# Patient Record
Sex: MALE | Race: WHITE | NOT HISPANIC OR LATINO | Employment: OTHER | URBAN - METROPOLITAN AREA
[De-identification: names, ages, dates, MRNs, and addresses within clinical notes are randomized per-mention and may not be internally consistent; named-entity substitution may affect disease eponyms.]

---

## 2017-02-02 ENCOUNTER — TELEPHONE (OUTPATIENT)
Dept: FAMILY MEDICINE | Facility: CLINIC | Age: 77
End: 2017-02-02

## 2017-02-02 DIAGNOSIS — E03.9 HYPOTHYROIDISM, UNSPECIFIED TYPE: Primary | ICD-10-CM

## 2017-02-02 DIAGNOSIS — R35.1 NOCTURIA: ICD-10-CM

## 2017-02-02 DIAGNOSIS — I10 ESSENTIAL HYPERTENSION: ICD-10-CM

## 2017-02-02 DIAGNOSIS — E78.5 HYPERLIPIDEMIA, UNSPECIFIED HYPERLIPIDEMIA TYPE: ICD-10-CM

## 2017-02-02 NOTE — TELEPHONE ENCOUNTER
Pt is asking for labs prior to upcoming annual appt on 2/23, asking for PSA as well.   Please advise and staff will  Need to call to sched.

## 2017-02-20 ENCOUNTER — LAB VISIT (OUTPATIENT)
Dept: LAB | Facility: HOSPITAL | Age: 77
End: 2017-02-20
Attending: INTERNAL MEDICINE
Payer: MEDICARE

## 2017-02-20 DIAGNOSIS — E78.5 HYPERLIPIDEMIA, UNSPECIFIED HYPERLIPIDEMIA TYPE: ICD-10-CM

## 2017-02-20 DIAGNOSIS — R35.1 NOCTURIA: ICD-10-CM

## 2017-02-20 DIAGNOSIS — E03.9 HYPOTHYROIDISM, UNSPECIFIED TYPE: ICD-10-CM

## 2017-02-20 DIAGNOSIS — I10 ESSENTIAL HYPERTENSION: ICD-10-CM

## 2017-02-20 LAB
ALBUMIN SERPL BCP-MCNC: 3.6 G/DL
ALP SERPL-CCNC: 63 U/L
ALT SERPL W/O P-5'-P-CCNC: 24 U/L
ANION GAP SERPL CALC-SCNC: 7 MMOL/L
AST SERPL-CCNC: 23 U/L
BASOPHILS # BLD AUTO: 0.03 K/UL
BASOPHILS NFR BLD: 0.3 %
BILIRUB SERPL-MCNC: 1.1 MG/DL
BUN SERPL-MCNC: 13 MG/DL
CALCIUM SERPL-MCNC: 9.5 MG/DL
CHLORIDE SERPL-SCNC: 109 MMOL/L
CHOLEST/HDLC SERPL: 5.1 {RATIO}
CO2 SERPL-SCNC: 26 MMOL/L
CREAT SERPL-MCNC: 1.2 MG/DL
DIFFERENTIAL METHOD: ABNORMAL
EOSINOPHIL # BLD AUTO: 0.3 K/UL
EOSINOPHIL NFR BLD: 3.1 %
ERYTHROCYTE [DISTWIDTH] IN BLOOD BY AUTOMATED COUNT: 13.2 %
EST. GFR  (AFRICAN AMERICAN): >60 ML/MIN/1.73 M^2
EST. GFR  (NON AFRICAN AMERICAN): 58.4 ML/MIN/1.73 M^2
GLUCOSE SERPL-MCNC: 92 MG/DL
HCT VFR BLD AUTO: 42.1 %
HDL/CHOLESTEROL RATIO: 19.5 %
HDLC SERPL-MCNC: 195 MG/DL
HDLC SERPL-MCNC: 38 MG/DL
HGB BLD-MCNC: 14.3 G/DL
LDLC SERPL CALC-MCNC: 122.4 MG/DL
LYMPHOCYTES # BLD AUTO: 3.2 K/UL
LYMPHOCYTES NFR BLD: 35 %
MCH RBC QN AUTO: 31.7 PG
MCHC RBC AUTO-ENTMCNC: 34 %
MCV RBC AUTO: 93 FL
MONOCYTES # BLD AUTO: 1.1 K/UL
MONOCYTES NFR BLD: 12.1 %
NEUTROPHILS # BLD AUTO: 4.5 K/UL
NEUTROPHILS NFR BLD: 49.3 %
NONHDLC SERPL-MCNC: 157 MG/DL
PLATELET # BLD AUTO: 216 K/UL
PMV BLD AUTO: 10 FL
POTASSIUM SERPL-SCNC: 4.7 MMOL/L
PROSTATE SPECIFIC ANTIGEN, TOTAL: 0.02 NG/ML
PROT SERPL-MCNC: 7.2 G/DL
PSA FREE MFR SERPL: NORMAL %
PSA FREE SERPL-MCNC: <0.01 NG/ML
RBC # BLD AUTO: 4.51 M/UL
SODIUM SERPL-SCNC: 142 MMOL/L
TRIGL SERPL-MCNC: 173 MG/DL
TSH SERPL DL<=0.005 MIU/L-ACNC: 2.68 UIU/ML
WBC # BLD AUTO: 9.17 K/UL

## 2017-02-20 PROCEDURE — 80061 LIPID PANEL: CPT

## 2017-02-20 PROCEDURE — 80053 COMPREHEN METABOLIC PANEL: CPT

## 2017-02-20 PROCEDURE — 36415 COLL VENOUS BLD VENIPUNCTURE: CPT | Mod: PO

## 2017-02-20 PROCEDURE — 85025 COMPLETE CBC W/AUTO DIFF WBC: CPT

## 2017-02-20 PROCEDURE — 84153 ASSAY OF PSA TOTAL: CPT

## 2017-02-20 PROCEDURE — 84443 ASSAY THYROID STIM HORMONE: CPT

## 2017-02-23 ENCOUNTER — OFFICE VISIT (OUTPATIENT)
Dept: INTERNAL MEDICINE | Facility: CLINIC | Age: 77
End: 2017-02-23
Payer: MEDICARE

## 2017-02-23 VITALS
HEIGHT: 68 IN | OXYGEN SATURATION: 98 % | SYSTOLIC BLOOD PRESSURE: 130 MMHG | HEART RATE: 61 BPM | BODY MASS INDEX: 26.36 KG/M2 | DIASTOLIC BLOOD PRESSURE: 70 MMHG | WEIGHT: 173.94 LBS | RESPIRATION RATE: 16 BRPM

## 2017-02-23 DIAGNOSIS — I10 ESSENTIAL HYPERTENSION: ICD-10-CM

## 2017-02-23 DIAGNOSIS — N18.2 CHRONIC KIDNEY DISEASE, STAGE II (MILD): ICD-10-CM

## 2017-02-23 DIAGNOSIS — E78.5 HYPERLIPIDEMIA, UNSPECIFIED HYPERLIPIDEMIA TYPE: ICD-10-CM

## 2017-02-23 DIAGNOSIS — Z00.00 HEALTH CARE MAINTENANCE: ICD-10-CM

## 2017-02-23 DIAGNOSIS — E03.9 HYPOTHYROIDISM, UNSPECIFIED TYPE: Primary | ICD-10-CM

## 2017-02-23 PROCEDURE — 1159F MED LIST DOCD IN RCRD: CPT | Mod: S$GLB,,, | Performed by: INTERNAL MEDICINE

## 2017-02-23 PROCEDURE — 99999 PR PBB SHADOW E&M-EST. PATIENT-LVL III: CPT | Mod: PBBFAC,,, | Performed by: INTERNAL MEDICINE

## 2017-02-23 PROCEDURE — 1126F AMNT PAIN NOTED NONE PRSNT: CPT | Mod: S$GLB,,, | Performed by: INTERNAL MEDICINE

## 2017-02-23 PROCEDURE — 3078F DIAST BP <80 MM HG: CPT | Mod: S$GLB,,, | Performed by: INTERNAL MEDICINE

## 2017-02-23 PROCEDURE — 1157F ADVNC CARE PLAN IN RCRD: CPT | Mod: S$GLB,,, | Performed by: INTERNAL MEDICINE

## 2017-02-23 PROCEDURE — 99499 UNLISTED E&M SERVICE: CPT | Mod: S$GLB,,, | Performed by: INTERNAL MEDICINE

## 2017-02-23 PROCEDURE — 3075F SYST BP GE 130 - 139MM HG: CPT | Mod: S$GLB,,, | Performed by: INTERNAL MEDICINE

## 2017-02-23 PROCEDURE — 1160F RVW MEDS BY RX/DR IN RCRD: CPT | Mod: S$GLB,,, | Performed by: INTERNAL MEDICINE

## 2017-02-23 PROCEDURE — 99214 OFFICE O/P EST MOD 30 MIN: CPT | Mod: S$GLB,,, | Performed by: INTERNAL MEDICINE

## 2017-02-23 RX ORDER — LEVOTHYROXINE SODIUM 75 UG/1
TABLET ORAL
Qty: 90 TABLET | Refills: 3 | Status: SHIPPED | OUTPATIENT
Start: 2017-02-23 | End: 2018-02-14 | Stop reason: SDUPTHER

## 2017-02-23 RX ORDER — LOSARTAN POTASSIUM 50 MG/1
50 TABLET ORAL DAILY
Qty: 90 TABLET | Refills: 3 | Status: SHIPPED | OUTPATIENT
Start: 2017-02-23 | End: 2017-08-04 | Stop reason: SDUPTHER

## 2017-02-23 RX ORDER — PRAVASTATIN SODIUM 20 MG/1
TABLET ORAL
Qty: 90 TABLET | Refills: 3 | Status: SHIPPED | OUTPATIENT
Start: 2017-02-23 | End: 2018-02-14 | Stop reason: SDUPTHER

## 2017-02-23 RX ORDER — PRAVASTATIN SODIUM 20 MG/1
TABLET ORAL
Qty: 90 TABLET | Refills: 2 | Status: CANCELLED | OUTPATIENT
Start: 2017-02-23

## 2017-02-23 RX ORDER — LEVOTHYROXINE SODIUM 75 UG/1
TABLET ORAL
Qty: 90 TABLET | Refills: 3 | Status: CANCELLED | OUTPATIENT
Start: 2017-02-23

## 2017-02-23 NOTE — PROGRESS NOTES
HISTORY OF PRESENT ILLNESS:  PtLaura is a 77 y.o. male presents for monitoring of his hypothyroidism, HTN, hyperlipidemia.  He also has glaucoma.  He had a HRA physical with the NP 10/18/16.  He states he has had a pneumovax previously, is UTD with Prevnar 13, Tdap, zoster, flu shot.  He had a fit check FIT, was negative for blood.  He has had colonoscopy previously.    Lab Results   Component Value Date    WBC 9.17 02/20/2017    HGB 14.3 02/20/2017    HCT 42.1 02/20/2017     02/20/2017    CHOL 195 02/20/2017    TRIG 173 (H) 02/20/2017    HDL 38 (L) 02/20/2017    ALT 24 02/20/2017    AST 23 02/20/2017     02/20/2017    K 4.7 02/20/2017     02/20/2017    CREATININE 1.2 02/20/2017    BUN 13 02/20/2017    CO2 26 02/20/2017    TSH 2.684 02/20/2017    PSA 0.11 01/22/2013    HGBA1C 5.2 01/24/2014     Lab Results   Component Value Date    LDLCALC 122.4 02/20/2017             ROS:  GENERAL: No fever, chills, fatigability or weight loss.  SKIN: No rashes, itching or changes in color or texture of skin.  HEAD: No headaches or recent head trauma.  EARS: Denies ear pain, discharge or vertigo.  NOSE: No loss of smell, no epistaxis or postnasal drip.  MOUTH & THROAT: No hoarseness or change in voice. No excessive gum bleeding.  NODES: Denies swollen glands.  CHEST: Denies PATTON, cyanosis, wheezing, cough and sputum production.  CARDIOVASCULAR: Denies chest pain, PND, orthopnea or reduced exercise tolerance.  ABDOMEN: Appetite fine. No weight loss. Denies constipation, diarrhea, abdominal pain, hematemesis or blood in stool.  URINARY: No flank pain, dysuria or hematuria.  PERIPHERAL VASCULAR: No claudication or cyanosis. No edema.  MUSCULOSKELETAL: No joint stiffness or swelling. Denies back pain.  NEUROLOGIC: Denies numbness    PE:   Vitals:   Vitals:    02/23/17 1745   BP: (!) 144/70   Pulse: 61   Resp: 16     GENERAL: no acute distress, A&Ox3, comfortable.  Male with BMI of 26   HEENT: tympanic membranes clear,  nasal mucosa pink, no pharyngeal erythema or exudate  NECK: supple, no cervical lymphadenopathy, no thyromegaly; no supraclavicular nodes;   CHEST:  clear to auscultation bilaterally, no crackles or wheeze; no increased work of breathing;  CARDIOVASCULAR: regular rate and rhythm, no rubs, murmurs or gallops.  ABDOMEN: normal bowel sounds, soft non-tender, non-distended; no palpable organomegaly;   EXT: no clubbing, cyanosis or edema.     ASSESSMENT/PLAN:    Hypothyroidism, unspecified type  -     levothyroxine (SYNTHROID) 75 MCG tablet; TAKE 1 TABLET EVERY DAY ( NEED  APPOINTMENT AND LAB )  Dispense: 90 tablet; Refill: 3    Essential hypertension  -     losartan (COZAAR) 50 MG tablet; Take 1 tablet (50 mg total) by mouth once daily.  Dispense: 90 tablet; Refill: 3    Hyperlipidemia, unspecified hyperlipidemia type  -     pravastatin (PRAVACHOL) 20 MG tablet; TAKE 1 TABLET ONE TIME DAILY  Dispense: 90 tablet; Refill: 3    CKD stage 2: encouraged free water intake;    Health Maintenance:  HRA physical with the NP 10/18/16.  He states he has had a pneumovax previously, is UTD with Prevnar 13, Tdap, zoster, flu shot.  He had a fit check FIT, was negative for blood.  He has had colonoscopy previously.        Call if condition changes or worsens.  Answers for HPI/ROS submitted by the patient on 2/21/2017   neck pain: No, No  unexpected weight change: No  hearing loss: No  rhinorrhea: No  trouble swallowing: No  eye discharge: No  visual disturbance: No  chest tightness: No  wheezing: No  chest pain: No  palpatations: No  blood in stool: No  constipation: No  vomiting: No  diarrhea: No  polydipsia: No  polyuria: No  difficulty urinating: No  urgency: No  hematuria: No  joint swelling: No  arthralgias: No  headaches: No  weakness: No  confusion: No  dysphoric mood: No

## 2017-02-23 NOTE — MR AVS SNAPSHOT
[facility]  [address] [facility] [address]  [facility] [address]  Phone: [phone]  Fax: [phone]                  Mina Valentine   2017 5:40 PM   Office Visit    Description:  Male : 1940   Provider:  Marcia Kearney MD   Department:  [facility]           Reason for Visit     Annual Exam           Diagnoses this Visit        Comments    Hypothyroidism, unspecified type    -  Primary     Essential hypertension         Hyperlipidemia, unspecified hyperlipidemia type                To Do List           Goals (5 Years of Data)     None       These Medications        Disp Refills Start End    levothyroxine (SYNTHROID) 75 MCG tablet 90 tablet 3 2017     TAKE 1 TABLET EVERY DAY ( NEED  APPOINTMENT AND LAB )    Pharmacy: [facility] - [facility] [address] Ph #: [phone]       losartan (COZAAR) 50 MG tablet 90 tablet 3 2017     Take 1 tablet (50 mg total) by mouth once daily. - Oral    Pharmacy: [facility] - [facility] [address] Ph #: [phone]       pravastatin (PRAVACHOL) 20 MG tablet 90 tablet 3 2017     TAKE 1 TABLET ONE TIME DAILY    Pharmacy: [facility] - [facility] [address] Ph #: [phone]         [facility]     [facility] -  Assistance  Registered nurses in the [facility] provide clinical advisement, health education, appointment booking, and other advisory services.  Call for this free service at [phone].             Medications           Message regarding Medications     Verify the changes and/or additions to your medication regime listed below are the same as discussed with your clinician today.  If any of these changes or additions are incorrect, please notify your healthcare provider.        CHANGE how you are taking these medications     Start Taking Instead of    losartan  "(COZAAR) 50 MG tablet losartan (COZAAR) 50 MG tablet    Dosage:  Take 1 tablet (50 mg total) by mouth once daily. Dosage:  TAKE 1 TABLET EVERY DAY    Reason for Change:  Reorder            Verify that the below list of medications is an accurate representation of the medications you are currently taking.  If none reported, the list may be blank. If incorrect, please contact your healthcare provider. Carry this list with you in case of emergency.           Current Medications     levothyroxine (SYNTHROID) 75 MCG tablet TAKE 1 TABLET EVERY DAY ( NEED  APPOINTMENT AND LAB )    loratadine (CLARITIN) 5 mg chewable tablet Take 5 mg by mouth once daily.    losartan (COZAAR) 50 MG tablet Take 1 tablet (50 mg total) by mouth once daily.    omega 3-dha-epa-fish oil (FISH OIL) 120-180-500 mg Cap     pravastatin (PRAVACHOL) 20 MG tablet TAKE 1 TABLET ONE TIME DAILY    timolol (TIMOPTIC) 0.5 % ophthalmic solution            Clinical Reference Information           Your Vitals Were     BP Pulse Resp Height Weight SpO2    130/70 61 16 5' 8" (1.727 m) 78.9 kg (173 lb 15.1 oz) 98%    BMI                26.45 kg/m2          Blood Pressure          Most Recent Value    BP  130/70 [home reading]      Allergies as of 2/23/2017     Penicillins      Immunizations Administered on Date of Encounter - 2/23/2017     None      Language Assistance Services     ATTENTION: Language assistance services are available, free of charge. Please call [phone].      ATENCIÓN: Si habla español, tiene a berger disposición servicios gratuitos de asistencia lingüística. Llame al [phone].     [facility] Ý: N?u b?n nói Ti?ng Vi?t, có các d?ch v? h? tr? ngôn ng? mi?n phí dành cho b?n. G?i s? [phone].         [facility] complies with applicable Federal civil rights laws and does not discriminate on the basis of race, color, national origin, age, disability, or sex.        "

## 2017-03-20 ENCOUNTER — PATIENT MESSAGE (OUTPATIENT)
Dept: INTERNAL MEDICINE | Facility: CLINIC | Age: 77
End: 2017-03-20

## 2017-03-25 ENCOUNTER — PATIENT MESSAGE (OUTPATIENT)
Dept: INTERNAL MEDICINE | Facility: CLINIC | Age: 77
End: 2017-03-25

## 2017-04-07 ENCOUNTER — PATIENT MESSAGE (OUTPATIENT)
Dept: INTERNAL MEDICINE | Facility: CLINIC | Age: 77
End: 2017-04-07

## 2017-04-09 ENCOUNTER — PATIENT MESSAGE (OUTPATIENT)
Dept: FAMILY MEDICINE | Facility: CLINIC | Age: 77
End: 2017-04-09

## 2017-04-10 ENCOUNTER — PATIENT MESSAGE (OUTPATIENT)
Dept: FAMILY MEDICINE | Facility: CLINIC | Age: 77
End: 2017-04-10

## 2017-04-10 ENCOUNTER — OFFICE VISIT (OUTPATIENT)
Dept: FAMILY MEDICINE | Facility: CLINIC | Age: 77
End: 2017-04-10
Payer: MEDICARE

## 2017-04-10 VITALS
SYSTOLIC BLOOD PRESSURE: 148 MMHG | DIASTOLIC BLOOD PRESSURE: 80 MMHG | RESPIRATION RATE: 18 BRPM | OXYGEN SATURATION: 97 % | TEMPERATURE: 98 F | BODY MASS INDEX: 25.99 KG/M2 | HEART RATE: 62 BPM | HEIGHT: 68 IN | WEIGHT: 171.5 LBS

## 2017-04-10 DIAGNOSIS — Z01.818 PRE-OPERATIVE CLEARANCE: ICD-10-CM

## 2017-04-10 DIAGNOSIS — H25.9 AGE-RELATED CATARACT OF LEFT EYE, UNSPECIFIED AGE-RELATED CATARACT TYPE: Primary | ICD-10-CM

## 2017-04-10 PROCEDURE — 1160F RVW MEDS BY RX/DR IN RCRD: CPT | Mod: S$GLB,,, | Performed by: NURSE PRACTITIONER

## 2017-04-10 PROCEDURE — 1157F ADVNC CARE PLAN IN RCRD: CPT | Mod: S$GLB,,, | Performed by: NURSE PRACTITIONER

## 2017-04-10 PROCEDURE — 99213 OFFICE O/P EST LOW 20 MIN: CPT | Mod: S$GLB,,, | Performed by: NURSE PRACTITIONER

## 2017-04-10 PROCEDURE — 99999 PR PBB SHADOW E&M-EST. PATIENT-LVL III: CPT | Mod: PBBFAC,,, | Performed by: NURSE PRACTITIONER

## 2017-04-10 PROCEDURE — 3077F SYST BP >= 140 MM HG: CPT | Mod: S$GLB,,, | Performed by: NURSE PRACTITIONER

## 2017-04-10 PROCEDURE — 1159F MED LIST DOCD IN RCRD: CPT | Mod: S$GLB,,, | Performed by: NURSE PRACTITIONER

## 2017-04-10 PROCEDURE — 3079F DIAST BP 80-89 MM HG: CPT | Mod: S$GLB,,, | Performed by: NURSE PRACTITIONER

## 2017-04-10 RX ORDER — NEPAFENAC 3 MG/ML
SUSPENSION/ DROPS OPHTHALMIC
COMMUNITY
Start: 2017-04-08 | End: 2017-08-04

## 2017-04-10 RX ORDER — BESIFLOXACIN 6 MG/ML
SUSPENSION OPHTHALMIC
COMMUNITY
Start: 2017-04-08 | End: 2017-08-04

## 2017-04-10 RX ORDER — DUREZOL 0.5 MG/ML
EMULSION OPHTHALMIC
COMMUNITY
Start: 2017-04-08 | End: 2017-08-04

## 2017-04-19 ENCOUNTER — PATIENT MESSAGE (OUTPATIENT)
Dept: FAMILY MEDICINE | Facility: CLINIC | Age: 77
End: 2017-04-19

## 2017-06-20 ENCOUNTER — TELEPHONE (OUTPATIENT)
Dept: ADMINISTRATIVE | Facility: HOSPITAL | Age: 77
End: 2017-06-20

## 2017-08-01 ENCOUNTER — TELEPHONE (OUTPATIENT)
Dept: FAMILY MEDICINE | Facility: CLINIC | Age: 77
End: 2017-08-01

## 2017-08-01 NOTE — TELEPHONE ENCOUNTER
----- Message from RT Ran sent at 8/1/2017  2:55 PM CDT -----  Contact: pt   pt , requesting to Cx his HRA appt of 08/10/2017, thanks.

## 2017-08-03 ENCOUNTER — DOCUMENTATION ONLY (OUTPATIENT)
Dept: FAMILY MEDICINE | Facility: CLINIC | Age: 77
End: 2017-08-03

## 2017-08-03 NOTE — PROGRESS NOTES
Pre-Visit Chart Review  For Appointment Scheduled on 8-4-17    Health Maintenance Due   Topic Date Due    Pneumococcal (65+) (2 of 2 - PPSV23) 02/10/2017    Influenza Vaccine  08/01/2017

## 2017-08-04 ENCOUNTER — OFFICE VISIT (OUTPATIENT)
Dept: FAMILY MEDICINE | Facility: CLINIC | Age: 77
End: 2017-08-04
Payer: MEDICARE

## 2017-08-04 ENCOUNTER — PATIENT MESSAGE (OUTPATIENT)
Dept: FAMILY MEDICINE | Facility: CLINIC | Age: 77
End: 2017-08-04

## 2017-08-04 VITALS
TEMPERATURE: 98 F | DIASTOLIC BLOOD PRESSURE: 60 MMHG | WEIGHT: 173.5 LBS | SYSTOLIC BLOOD PRESSURE: 150 MMHG | HEIGHT: 68 IN | BODY MASS INDEX: 26.3 KG/M2 | HEART RATE: 69 BPM

## 2017-08-04 DIAGNOSIS — I10 ESSENTIAL HYPERTENSION: ICD-10-CM

## 2017-08-04 DIAGNOSIS — E78.5 HYPERLIPIDEMIA, UNSPECIFIED HYPERLIPIDEMIA TYPE: ICD-10-CM

## 2017-08-04 DIAGNOSIS — E03.9 HYPOTHYROIDISM, UNSPECIFIED TYPE: ICD-10-CM

## 2017-08-04 DIAGNOSIS — N18.2 CHRONIC KIDNEY DISEASE, STAGE II (MILD): Primary | ICD-10-CM

## 2017-08-04 PROCEDURE — 3008F BODY MASS INDEX DOCD: CPT | Mod: S$GLB,,, | Performed by: FAMILY MEDICINE

## 2017-08-04 PROCEDURE — 99214 OFFICE O/P EST MOD 30 MIN: CPT | Mod: S$GLB,,, | Performed by: FAMILY MEDICINE

## 2017-08-04 PROCEDURE — 99999 PR PBB SHADOW E&M-EST. PATIENT-LVL III: CPT | Mod: PBBFAC,,, | Performed by: FAMILY MEDICINE

## 2017-08-04 PROCEDURE — 99499 UNLISTED E&M SERVICE: CPT | Mod: S$GLB,,, | Performed by: FAMILY MEDICINE

## 2017-08-04 PROCEDURE — 3077F SYST BP >= 140 MM HG: CPT | Mod: S$GLB,,, | Performed by: FAMILY MEDICINE

## 2017-08-04 PROCEDURE — 3078F DIAST BP <80 MM HG: CPT | Mod: S$GLB,,, | Performed by: FAMILY MEDICINE

## 2017-08-04 PROCEDURE — 1159F MED LIST DOCD IN RCRD: CPT | Mod: S$GLB,,, | Performed by: FAMILY MEDICINE

## 2017-08-04 PROCEDURE — 1126F AMNT PAIN NOTED NONE PRSNT: CPT | Mod: S$GLB,,, | Performed by: FAMILY MEDICINE

## 2017-08-04 RX ORDER — LOSARTAN POTASSIUM 100 MG/1
100 TABLET ORAL DAILY
Qty: 90 TABLET | Refills: 3 | Status: SHIPPED | OUTPATIENT
Start: 2017-08-04 | End: 2018-02-14 | Stop reason: SDUPTHER

## 2017-08-04 NOTE — PATIENT INSTRUCTIONS
Controlling High Blood Pressure  High blood pressure (hypertension) is often called the silent killer. This is because many people who have it dont know it. High blood pressure is defined as 140/90 mm Hg or higher. Know your blood pressure and remember to check it regularly. Doing so can save your life. Here are some things you can do to help control your blood pressure.    Choose heart-healthy foods  · Select low-salt, low-fat foods. Limit sodium intake to 2,400 mg per day or the amount suggested by your healthcare provider.  · Limit canned, dried, cured, packaged, and fast foods. These can contain a lot of salt.  · Eat 8 to 10 servings of fruits and vegetables every day.  · Choose lean meats, fish, or chicken.  · Eat whole-grain pasta, brown rice, and beans.  · Eat 2 to 3 servings of low-fat or fat-free dairy products.  · Ask your doctor about the DASH eating plan. This plan helps reduce blood pressure.  · When you go to a restaurant, ask that your meal be prepared with no added salt.  Maintain a healthy weight  · Ask your healthcare provider how many calories to eat a day. Then stick to that number.  · Ask your healthcare provider what weight range is healthiest for you. If you are overweight, a weight loss of only 3% to 5% of your body weight can help lower blood pressure. Generally, a good weight loss goal is to lose 10% of your body weight in a year.  · Limit snacks and sweets.  · Get regular exercise.  Get up and get active  · Choose activities you enjoy. Find ones you can do with friends or family. This includes bicycling, dancing, walking, and jogging.  · Park farther away from building entrances.  · Use stairs instead of the elevator.  · When you can, walk or bike instead of driving.  · Garfield leaves, garden, or do household repairs.  · Be active at a moderate to vigorous level of physical activity for at least 40 minutes for a minimum of 3 to 4 days a week.   Manage stress  · Make time to relax and enjoy  life. Find time to laugh.  · Communicate your concerns with your loved ones and your healthcare provider.  · Visit with family and friends, and keep up with hobbies.  Limit alcohol and quit smoking  · Men should have no more than 2 drinks per day.  · Women should have no more than 1 drink per day.  · Talk with your healthcare provider about quitting smoking. Smoking significantly increases your risk for heart disease and stroke. Ask your healthcare provider about community smoking cessation programs and other options.  Medicines  If lifestyle changes arent enough, your healthcare provider may prescribe high blood pressure medicine. Take all medicines as prescribed. If you have any questions about your medicines, ask your healthcare provider before stopping or changing them.   Date Last Reviewed: 4/27/2016  © 4950-0475 [employer]. [address] [place] [address], [place] [address]. All rights reserved. This information is not intended as a substitute for professional medical care. Always follow your healthcare professional's instructions.

## 2017-08-15 NOTE — PROGRESS NOTES
Subjective:       Patient ID: Mina Valentine is a 77 y.o. male.    Chief Complaint: Establish Care    Patient Active Problem List   Diagnosis    HTN (hypertension)    Chronic kidney disease, stage II (mild)    Hyperlipidemia    Glaucoma (increased eye pressure)    Hypothyroidism     Lab Results   Component Value Date    WBC 9.17 02/20/2017    HGB 14.3 02/20/2017    HCT 42.1 02/20/2017     02/20/2017    CHOL 195 02/20/2017    TRIG 173 (H) 02/20/2017    HDL 38 (L) 02/20/2017    ALT 24 02/20/2017    AST 23 02/20/2017     02/20/2017    K 4.7 02/20/2017     02/20/2017    CREATININE 1.2 02/20/2017    BUN 13 02/20/2017    CO2 26 02/20/2017    TSH 2.684 02/20/2017    PSA 0.11 01/22/2013    HGBA1C 5.2 01/24/2014     HPI  Review of Systems   Constitutional: Negative for activity change and unexpected weight change.   HENT: Negative for hearing loss, rhinorrhea and trouble swallowing.    Eyes: Negative for discharge and visual disturbance.   Respiratory: Negative for chest tightness and wheezing.    Cardiovascular: Negative for chest pain and palpitations.   Gastrointestinal: Negative for blood in stool, constipation, diarrhea and vomiting.   Endocrine: Negative for polydipsia and polyuria.   Genitourinary: Negative for difficulty urinating, hematuria and urgency.   Musculoskeletal: Negative for arthralgias, joint swelling and neck pain.   Neurological: Negative for weakness and headaches.   Psychiatric/Behavioral: Negative for confusion and dysphoric mood.       Objective:      Physical Exam   Constitutional: He is oriented to person, place, and time. He appears well-developed and well-nourished.   Cardiovascular: Normal rate, regular rhythm and normal heart sounds.    Pulmonary/Chest: Effort normal and breath sounds normal.   Musculoskeletal: He exhibits no edema.   Neurological: He is alert and oriented to person, place, and time.   Skin: Skin is warm and dry.   Psychiatric: He has a normal mood  and affect.   Nursing note and vitals reviewed.      Assessment:       1. Chronic kidney disease, stage II (mild)    2. Essential hypertension    3. Hyperlipidemia, unspecified hyperlipidemia type    4. Hypothyroidism, unspecified type        Plan:       1. Essential hypertension  Uncontrolled.  Increase to:  - losartan (COZAAR) 100 MG tablet; Take 1 tablet (100 mg total) by mouth once daily.  Dispense: 90 tablet; Refill: 3    2. Chronic kidney disease, stage II (mild)  Stable and chronic.  Will continue to monitor q3-6 months and control chronic conditions as optimally as possible to preserve function.      3. Hyperlipidemia, unspecified hyperlipidemia type  Stable condition.  Continue current medications.  Will adjust based on lab findings or if condition changes.    - Comprehensive metabolic panel; Future  - Lipid panel; Future    4. Hypothyroidism, unspecified type  Stable condition.  Continue current medications.  Will adjust based on lab findings or if condition changes.    - CBC auto differential; Future  - TSH; Future  - T4, free; Future    PCM goal documentation:  Patient readiness: acceptance and barriers:readiness  During the course of the visit the patient was educated and counseled about the following: Hypertension:   Dietary sodium restriction.  Regular aerobic exercise.  Goals: Hypertension: Reduce Blood Pressure  Goal/Outcomes met:Hypertension  The following self management tools provided:none  Patient Instructions (the written plan) was given to the patient/family: Yes  Time spent with patient: 20 minutes    Patient with be reevaluated in 6 months or sooner prn.  Nurse BP check 4 weeks    Greater than 50% of this visit was spent counseling as described in above documentation:Yes

## 2017-09-01 ENCOUNTER — LAB VISIT (OUTPATIENT)
Dept: LAB | Facility: HOSPITAL | Age: 77
End: 2017-09-01
Attending: FAMILY MEDICINE
Payer: MEDICARE

## 2017-09-01 ENCOUNTER — CLINICAL SUPPORT (OUTPATIENT)
Dept: FAMILY MEDICINE | Facility: CLINIC | Age: 77
End: 2017-09-01
Payer: MEDICARE

## 2017-09-01 VITALS — DIASTOLIC BLOOD PRESSURE: 75 MMHG | HEART RATE: 71 BPM | SYSTOLIC BLOOD PRESSURE: 167 MMHG

## 2017-09-01 DIAGNOSIS — E03.9 HYPOTHYROIDISM, UNSPECIFIED TYPE: ICD-10-CM

## 2017-09-01 DIAGNOSIS — E78.5 HYPERLIPIDEMIA, UNSPECIFIED HYPERLIPIDEMIA TYPE: ICD-10-CM

## 2017-09-01 LAB
ALBUMIN SERPL BCP-MCNC: 3.4 G/DL
ALP SERPL-CCNC: 68 U/L
ALT SERPL W/O P-5'-P-CCNC: 28 U/L
ANION GAP SERPL CALC-SCNC: 7 MMOL/L
AST SERPL-CCNC: 23 U/L
BASOPHILS # BLD AUTO: 0.03 K/UL
BASOPHILS NFR BLD: 0.3 %
BILIRUB SERPL-MCNC: 1 MG/DL
BUN SERPL-MCNC: 19 MG/DL
CALCIUM SERPL-MCNC: 9.5 MG/DL
CHLORIDE SERPL-SCNC: 109 MMOL/L
CHOLEST SERPL-MCNC: 206 MG/DL
CHOLEST/HDLC SERPL: 5.3 {RATIO}
CO2 SERPL-SCNC: 24 MMOL/L
CREAT SERPL-MCNC: 1.4 MG/DL
DIFFERENTIAL METHOD: NORMAL
EOSINOPHIL # BLD AUTO: 0.2 K/UL
EOSINOPHIL NFR BLD: 2.3 %
ERYTHROCYTE [DISTWIDTH] IN BLOOD BY AUTOMATED COUNT: 12.8 %
EST. GFR  (AFRICAN AMERICAN): 56 ML/MIN/1.73 M^2
EST. GFR  (NON AFRICAN AMERICAN): 48.5 ML/MIN/1.73 M^2
GLUCOSE SERPL-MCNC: 90 MG/DL
HCT VFR BLD AUTO: 43.3 %
HDLC SERPL-MCNC: 39 MG/DL
HDLC SERPL: 18.9 %
HGB BLD-MCNC: 14.4 G/DL
LDLC SERPL CALC-MCNC: 134.6 MG/DL
LYMPHOCYTES # BLD AUTO: 3 K/UL
LYMPHOCYTES NFR BLD: 35.3 %
MCH RBC QN AUTO: 31 PG
MCHC RBC AUTO-ENTMCNC: 33.3 G/DL
MCV RBC AUTO: 93 FL
MONOCYTES # BLD AUTO: 0.9 K/UL
MONOCYTES NFR BLD: 10.1 %
NEUTROPHILS # BLD AUTO: 4.5 K/UL
NEUTROPHILS NFR BLD: 51.9 %
NONHDLC SERPL-MCNC: 167 MG/DL
PLATELET # BLD AUTO: 232 K/UL
PMV BLD AUTO: 10.1 FL
POTASSIUM SERPL-SCNC: 4.9 MMOL/L
PROT SERPL-MCNC: 7.3 G/DL
RBC # BLD AUTO: 4.64 M/UL
SODIUM SERPL-SCNC: 140 MMOL/L
T4 FREE SERPL-MCNC: 1.08 NG/DL
TRIGL SERPL-MCNC: 162 MG/DL
TSH SERPL DL<=0.005 MIU/L-ACNC: 3.04 UIU/ML
WBC # BLD AUTO: 8.62 K/UL

## 2017-09-01 PROCEDURE — 80053 COMPREHEN METABOLIC PANEL: CPT

## 2017-09-01 PROCEDURE — 85025 COMPLETE CBC W/AUTO DIFF WBC: CPT

## 2017-09-01 PROCEDURE — 84443 ASSAY THYROID STIM HORMONE: CPT

## 2017-09-01 PROCEDURE — 84439 ASSAY OF FREE THYROXINE: CPT

## 2017-09-01 PROCEDURE — 80061 LIPID PANEL: CPT

## 2017-09-01 PROCEDURE — 36415 COLL VENOUS BLD VENIPUNCTURE: CPT | Mod: PO

## 2017-09-01 PROCEDURE — 99999 PR PBB SHADOW E&M-EST. PATIENT-LVL I: CPT | Mod: PBBFAC,,, | Performed by: FAMILY MEDICINE

## 2017-09-01 NOTE — PROGRESS NOTES
2 patient identifiers used ;BP taken in left arm 1st :189/88 p:74  , patient sitting ,feet flat on floor,back straight   2nd reading done in rt arm :167/75 p: 71 ,back straight , feet flat on floor . Last medication was this morning 9/1/17. Patient states under some stress this morning with mother in law.

## 2018-01-22 ENCOUNTER — TELEPHONE (OUTPATIENT)
Dept: FAMILY MEDICINE | Facility: CLINIC | Age: 78
End: 2018-01-22

## 2018-01-22 DIAGNOSIS — E03.9 HYPOTHYROIDISM, UNSPECIFIED TYPE: ICD-10-CM

## 2018-01-22 DIAGNOSIS — E78.5 HYPERLIPIDEMIA, UNSPECIFIED HYPERLIPIDEMIA TYPE: Primary | ICD-10-CM

## 2018-01-22 DIAGNOSIS — N18.2 CHRONIC KIDNEY DISEASE, STAGE II (MILD): ICD-10-CM

## 2018-01-22 NOTE — TELEPHONE ENCOUNTER
Patient has a appointment on 2-14-18, would like to do labs before appointment. Would you like to order any labs? Please advise.

## 2018-01-22 NOTE — TELEPHONE ENCOUNTER
----- Message from Dc Larios sent at 1/22/2018  1:16 PM CST -----  Contact: self   Patient need orders for bloodwork, any questions please call back at [phone] (home)

## 2018-02-06 ENCOUNTER — LAB VISIT (OUTPATIENT)
Dept: LAB | Facility: HOSPITAL | Age: 78
End: 2018-02-06
Attending: FAMILY MEDICINE
Payer: MEDICARE

## 2018-02-06 DIAGNOSIS — E03.9 HYPOTHYROIDISM, UNSPECIFIED TYPE: ICD-10-CM

## 2018-02-06 DIAGNOSIS — E78.5 HYPERLIPIDEMIA, UNSPECIFIED HYPERLIPIDEMIA TYPE: ICD-10-CM

## 2018-02-06 DIAGNOSIS — N18.2 CHRONIC KIDNEY DISEASE, STAGE II (MILD): ICD-10-CM

## 2018-02-06 LAB
ALBUMIN SERPL BCP-MCNC: 3.7 G/DL
ALP SERPL-CCNC: 72 U/L
ALT SERPL W/O P-5'-P-CCNC: 25 U/L
ANION GAP SERPL CALC-SCNC: 10 MMOL/L
AST SERPL-CCNC: 25 U/L
BASOPHILS # BLD AUTO: 0.06 K/UL
BASOPHILS NFR BLD: 0.7 %
BILIRUB SERPL-MCNC: 1.3 MG/DL
BUN SERPL-MCNC: 16 MG/DL
CALCIUM SERPL-MCNC: 9.6 MG/DL
CHLORIDE SERPL-SCNC: 106 MMOL/L
CHOLEST SERPL-MCNC: 207 MG/DL
CHOLEST/HDLC SERPL: 3.9 {RATIO}
CO2 SERPL-SCNC: 24 MMOL/L
CREAT SERPL-MCNC: 1.2 MG/DL
DIFFERENTIAL METHOD: NORMAL
EOSINOPHIL # BLD AUTO: 0.2 K/UL
EOSINOPHIL NFR BLD: 1.7 %
ERYTHROCYTE [DISTWIDTH] IN BLOOD BY AUTOMATED COUNT: 12.7 %
EST. GFR  (AFRICAN AMERICAN): >60 ML/MIN/1.73 M^2
EST. GFR  (NON AFRICAN AMERICAN): 58.4 ML/MIN/1.73 M^2
GLUCOSE SERPL-MCNC: 94 MG/DL
HCT VFR BLD AUTO: 43.3 %
HDLC SERPL-MCNC: 53 MG/DL
HDLC SERPL: 25.6 %
HGB BLD-MCNC: 14.1 G/DL
IMM GRANULOCYTES # BLD AUTO: 0.02 K/UL
IMM GRANULOCYTES NFR BLD AUTO: 0.2 %
LDLC SERPL CALC-MCNC: 124 MG/DL
LYMPHOCYTES # BLD AUTO: 3 K/UL
LYMPHOCYTES NFR BLD: 32.4 %
MCH RBC QN AUTO: 30.5 PG
MCHC RBC AUTO-ENTMCNC: 32.6 G/DL
MCV RBC AUTO: 94 FL
MONOCYTES # BLD AUTO: 0.9 K/UL
MONOCYTES NFR BLD: 9.6 %
NEUTROPHILS # BLD AUTO: 5.1 K/UL
NEUTROPHILS NFR BLD: 55.4 %
NONHDLC SERPL-MCNC: 154 MG/DL
NRBC BLD-RTO: 0 /100 WBC
PLATELET # BLD AUTO: 261 K/UL
PMV BLD AUTO: 9.9 FL
POTASSIUM SERPL-SCNC: 4.2 MMOL/L
PROT SERPL-MCNC: 7.7 G/DL
RBC # BLD AUTO: 4.63 M/UL
SODIUM SERPL-SCNC: 140 MMOL/L
T3FREE SERPL-MCNC: 2.5 PG/ML
T4 FREE SERPL-MCNC: 1.12 NG/DL
TRIGL SERPL-MCNC: 150 MG/DL
TSH SERPL DL<=0.005 MIU/L-ACNC: 4.13 UIU/ML
WBC # BLD AUTO: 9.18 K/UL

## 2018-02-06 PROCEDURE — 80061 LIPID PANEL: CPT

## 2018-02-06 PROCEDURE — 85025 COMPLETE CBC W/AUTO DIFF WBC: CPT

## 2018-02-06 PROCEDURE — 84439 ASSAY OF FREE THYROXINE: CPT

## 2018-02-06 PROCEDURE — 84443 ASSAY THYROID STIM HORMONE: CPT

## 2018-02-06 PROCEDURE — 36415 COLL VENOUS BLD VENIPUNCTURE: CPT | Mod: PO

## 2018-02-06 PROCEDURE — 84481 FREE ASSAY (FT-3): CPT

## 2018-02-06 PROCEDURE — 80053 COMPREHEN METABOLIC PANEL: CPT

## 2018-02-14 ENCOUNTER — OFFICE VISIT (OUTPATIENT)
Dept: FAMILY MEDICINE | Facility: CLINIC | Age: 78
End: 2018-02-14
Payer: MEDICARE

## 2018-02-14 ENCOUNTER — DOCUMENTATION ONLY (OUTPATIENT)
Dept: FAMILY MEDICINE | Facility: CLINIC | Age: 78
End: 2018-02-14

## 2018-02-14 VITALS
SYSTOLIC BLOOD PRESSURE: 160 MMHG | TEMPERATURE: 98 F | BODY MASS INDEX: 26.03 KG/M2 | HEART RATE: 68 BPM | DIASTOLIC BLOOD PRESSURE: 66 MMHG | HEIGHT: 68 IN | WEIGHT: 171.75 LBS

## 2018-02-14 DIAGNOSIS — I10 ESSENTIAL HYPERTENSION: Primary | ICD-10-CM

## 2018-02-14 DIAGNOSIS — E78.5 HYPERLIPIDEMIA, UNSPECIFIED HYPERLIPIDEMIA TYPE: ICD-10-CM

## 2018-02-14 DIAGNOSIS — E03.9 HYPOTHYROIDISM, UNSPECIFIED TYPE: ICD-10-CM

## 2018-02-14 PROCEDURE — 3008F BODY MASS INDEX DOCD: CPT | Mod: S$GLB,,, | Performed by: FAMILY MEDICINE

## 2018-02-14 PROCEDURE — 99999 PR PBB SHADOW E&M-EST. PATIENT-LVL III: CPT | Mod: PBBFAC,,, | Performed by: FAMILY MEDICINE

## 2018-02-14 PROCEDURE — 99214 OFFICE O/P EST MOD 30 MIN: CPT | Mod: S$GLB,,, | Performed by: FAMILY MEDICINE

## 2018-02-14 PROCEDURE — 99499 UNLISTED E&M SERVICE: CPT | Mod: S$GLB,,, | Performed by: FAMILY MEDICINE

## 2018-02-14 PROCEDURE — 1126F AMNT PAIN NOTED NONE PRSNT: CPT | Mod: S$GLB,,, | Performed by: FAMILY MEDICINE

## 2018-02-14 PROCEDURE — 1159F MED LIST DOCD IN RCRD: CPT | Mod: S$GLB,,, | Performed by: FAMILY MEDICINE

## 2018-02-14 RX ORDER — LOSARTAN POTASSIUM 100 MG/1
100 TABLET ORAL DAILY
Qty: 90 TABLET | Refills: 3 | Status: SHIPPED | OUTPATIENT
Start: 2018-02-14 | End: 2020-11-17 | Stop reason: SDUPTHER

## 2018-02-14 RX ORDER — PRAVASTATIN SODIUM 20 MG/1
TABLET ORAL
Qty: 90 TABLET | Refills: 3 | Status: SHIPPED | OUTPATIENT
Start: 2018-02-14 | End: 2020-11-25

## 2018-02-14 RX ORDER — METOPROLOL SUCCINATE 50 MG/1
50 TABLET, EXTENDED RELEASE ORAL DAILY
Qty: 90 TABLET | Refills: 3 | Status: SHIPPED | OUTPATIENT
Start: 2018-02-14 | End: 2018-08-15 | Stop reason: SINTOL

## 2018-02-14 RX ORDER — LEVOTHYROXINE SODIUM 75 UG/1
TABLET ORAL
Qty: 90 TABLET | Refills: 3 | Status: SHIPPED | OUTPATIENT
Start: 2018-02-14 | End: 2020-12-15 | Stop reason: SDUPTHER

## 2018-02-14 NOTE — PROGRESS NOTES
Subjective:       Patient ID: Mina Valentine is a 78 y.o. male.    Chief Complaint: Annual Exam    Patient Active Problem List   Diagnosis    HTN (hypertension)    Chronic kidney disease, stage II (mild)    Hyperlipidemia    Glaucoma (increased eye pressure)    Hypothyroidism   reviewed recent labs'  Lab Visit on 02/06/2018   Component Date Value Ref Range Status    WBC 02/06/2018 9.18  3.90 - 12.70 K/uL Final    RBC 02/06/2018 4.63  4.60 - 6.20 M/uL Final    Hemoglobin 02/06/2018 14.1  14.0 - 18.0 g/dL Final    Hematocrit 02/06/2018 43.3  40.0 - 54.0 % Final    MCV 02/06/2018 94  82 - 98 fL Final    MCH 02/06/2018 30.5  27.0 - 31.0 pg Final    MCHC 02/06/2018 32.6  32.0 - 36.0 g/dL Final    RDW 02/06/2018 12.7  11.5 - 14.5 % Final    Platelets 02/06/2018 261  150 - 350 K/uL Final    MPV 02/06/2018 9.9  9.2 - 12.9 fL Final    Immature Granulocytes 02/06/2018 0.2  0.0 - 0.5 % Final    Gran # (ANC) 02/06/2018 5.1  1.8 - 7.7 K/uL Final    Immature Grans (Abs) 02/06/2018 0.02  0.00 - 0.04 K/uL Final    Lymph # 02/06/2018 3.0  1.0 - 4.8 K/uL Final    Mono # 02/06/2018 0.9  0.3 - 1.0 K/uL Final    Eos # 02/06/2018 0.2  0.0 - 0.5 K/uL Final    Baso # 02/06/2018 0.06  0.00 - 0.20 K/uL Final    nRBC 02/06/2018 0  0 /100 WBC Final    Gran% 02/06/2018 55.4  38.0 - 73.0 % Final    Lymph% 02/06/2018 32.4  18.0 - 48.0 % Final    Mono% 02/06/2018 9.6  4.0 - 15.0 % Final    Eosinophil% 02/06/2018 1.7  0.0 - 8.0 % Final    Basophil% 02/06/2018 0.7  0.0 - 1.9 % Final    Differential Method 02/06/2018 Automated   Final    Sodium 02/06/2018 140  136 - 145 mmol/L Final    Potassium 02/06/2018 4.2  3.5 - 5.1 mmol/L Final    Chloride 02/06/2018 106  95 - 110 mmol/L Final    CO2 02/06/2018 24  23 - 29 mmol/L Final    Glucose 02/06/2018 94  70 - 110 mg/dL Final    BUN, Bld 02/06/2018 16  8 - 23 mg/dL Final    Creatinine 02/06/2018 1.2  0.5 - 1.4 mg/dL Final    Calcium 02/06/2018 9.6  8.7 - 10.5 mg/dL  Final    Total Protein 02/06/2018 7.7  6.0 - 8.4 g/dL Final    Albumin 02/06/2018 3.7  3.5 - 5.2 g/dL Final    Total Bilirubin 02/06/2018 1.3* 0.1 - 1.0 mg/dL Final    Alkaline Phosphatase 02/06/2018 72  55 - 135 U/L Final    AST 02/06/2018 25  10 - 40 U/L Final    ALT 02/06/2018 25  10 - 44 U/L Final    Anion Gap 02/06/2018 10  8 - 16 mmol/L Final    eGFR if African American 02/06/2018 >60.0  >60 mL/min/1.73 m^2 Final    eGFR if non African American 02/06/2018 58.4* >60 mL/min/1.73 m^2 Final    Cholesterol 02/06/2018 207* 120 - 199 mg/dL Final    Triglycerides 02/06/2018 150  30 - 150 mg/dL Final    HDL 02/06/2018 53  40 - 75 mg/dL Final    LDL Cholesterol 02/06/2018 124.0  63.0 - 159.0 mg/dL Final    HDL/Chol Ratio 02/06/2018 25.6  20.0 - 50.0 % Final    Total Cholesterol/HDL Ratio 02/06/2018 3.9  2.0 - 5.0 Final    Non-HDL Cholesterol 02/06/2018 154  mg/dL Final    TSH 02/06/2018 4.133* 0.400 - 4.000 uIU/mL Final    T3, Free 02/06/2018 2.5  2.3 - 4.2 pg/mL Final    Free T4 02/06/2018 1.12  0.71 - 1.51 ng/dL Final       HPI  Review of Systems   Constitutional: Negative for activity change and unexpected weight change.   HENT: Negative for hearing loss, rhinorrhea and trouble swallowing.    Eyes: Negative for discharge and visual disturbance.   Respiratory: Negative for chest tightness and wheezing.    Cardiovascular: Negative for chest pain and palpitations.   Gastrointestinal: Negative for blood in stool, constipation, diarrhea and vomiting.   Endocrine: Negative for polydipsia and polyuria.   Genitourinary: Negative for difficulty urinating, hematuria and urgency.   Musculoskeletal: Negative for arthralgias, joint swelling and neck pain.   Neurological: Negative for weakness and headaches.   Psychiatric/Behavioral: Negative for confusion and dysphoric mood.       Objective:      Physical Exam   Constitutional: He is oriented to person, place, and time. He appears well-developed and  well-nourished.   Cardiovascular: Normal rate, regular rhythm and normal heart sounds.    Pulmonary/Chest: Effort normal and breath sounds normal.   Musculoskeletal: He exhibits no edema.   Neurological: He is alert and oriented to person, place, and time.   Skin: Skin is warm and dry.   Psychiatric: He has a normal mood and affect.   Nursing note and vitals reviewed.      Assessment:       1. Essential hypertension    2. Hyperlipidemia, unspecified hyperlipidemia type    3. Hypothyroidism, unspecified type        Plan:       1. Essential hypertension  Uncontrolled.  Add:  - metoprolol succinate (TOPROL-XL) 50 MG 24 hr tablet; Take 1 tablet (50 mg total) by mouth once daily.  Dispense: 90 tablet; Refill: 3  Continue  - losartan (COZAAR) 100 MG tablet; Take 1 tablet (100 mg total) by mouth once daily.  Dispense: 90 tablet; Refill: 3    2. Hyperlipidemia, unspecified hyperlipidemia type  Controlled on current medications.  Continue current medications.    - pravastatin (PRAVACHOL) 20 MG tablet; TAKE 1 TABLET ONE TIME DAILY  Dispense: 90 tablet; Refill: 3    3. Hypothyroidism, unspecified type  Stable condition.  Continue current medications.  Will adjust based on lab findings or if condition changes.'    - levothyroxine (SYNTHROID) 75 MCG tablet; TAKE 1 TABLET EVERY DAY ( NEED  APPOINTMENT AND LAB )  Dispense: 90 tablet; Refill: 3  [facility] goal documentation:  Patient readiness: acceptance and barriers:readiness  During the course of the visit the patient was educated and counseled about the following: Hypertension:   Dietary sodium restriction.  Regular aerobic exercise.  Goals: Hypertension: Reduce Blood Pressure  Goal/Outcomes met:Hypertension  The following self management tools provided:none  Patient Instructions (the written plan) was given to the patient/family: Yes  Time spent with patient: 20 minutes    Patient with be reevaluated in 6 months or sooner prn    Greater than 50% of this visit was spent counseling  as described in above documentation:Yes

## 2018-02-14 NOTE — PROGRESS NOTES
Pre-Visit Chart Review  For Appointment Scheduled on 2-14-18    There are no preventive care reminders to display for this patient.

## 2018-03-14 ENCOUNTER — PES CALL (OUTPATIENT)
Dept: ADMINISTRATIVE | Facility: CLINIC | Age: 78
End: 2018-03-14

## 2018-03-21 ENCOUNTER — CLINICAL SUPPORT (OUTPATIENT)
Dept: FAMILY MEDICINE | Facility: CLINIC | Age: 78
End: 2018-03-21
Payer: MEDICARE

## 2018-03-21 DIAGNOSIS — I10 ESSENTIAL HYPERTENSION: Primary | ICD-10-CM

## 2018-03-21 PROCEDURE — 99999 PR PBB SHADOW E&M-EST. PATIENT-LVL II: CPT | Mod: PBBFAC,,, | Performed by: FAMILY MEDICINE

## 2018-03-22 VITALS — DIASTOLIC BLOOD PRESSURE: 60 MMHG | HEART RATE: 54 BPM | SYSTOLIC BLOOD PRESSURE: 140 MMHG

## 2018-03-22 NOTE — NURSING
2 patient identifiers used (name & ). Patient came in for nurse blood pressure check. Automatic reading was 154/62 P-54. Left arm resting on desk, feet flat on floor, back straight. Patient had no c/o pain. Patient stated that they took their prescribed blood pressure medication this am at 630am. Allergies and medications reviewed with the patient. Blood pressure re-checked after 5 minutes with manual cuff, reading was 140/60. Patient advised to continue taking medications as prescribed and follow up as scheduled.

## 2018-03-22 NOTE — PROGRESS NOTES
Patient came in for nurse blood pressure check. Automatic reading was 154/62 P-54. Left arm resting on desk, feet flat on floor, back straight. Patient had no c/o pain. Patient stated that they took their prescribed blood pressure medication this am at 630am. Allergies and medications reviewed with the patient. Blood pressure re-checked after 5 minutes with manual cuff, reading was 140/60. Patient advised to continue taking medications as prescribed and follow up as scheduled.

## 2018-04-06 ENCOUNTER — DOCUMENTATION ONLY (OUTPATIENT)
Dept: FAMILY MEDICINE | Facility: CLINIC | Age: 78
End: 2018-04-06

## 2018-05-17 ENCOUNTER — DOCUMENTATION ONLY (OUTPATIENT)
Dept: FAMILY MEDICINE | Facility: CLINIC | Age: 78
End: 2018-05-17

## 2018-05-17 NOTE — PROGRESS NOTES
Pre-Visit Chart Review  For Appointment Scheduled on 05/18/2018    There are no preventive care reminders to display for this patient.

## 2018-05-18 ENCOUNTER — OFFICE VISIT (OUTPATIENT)
Dept: FAMILY MEDICINE | Facility: CLINIC | Age: 78
End: 2018-05-18
Payer: MEDICARE

## 2018-05-18 VITALS
DIASTOLIC BLOOD PRESSURE: 60 MMHG | HEIGHT: 68 IN | WEIGHT: 171.5 LBS | BODY MASS INDEX: 25.99 KG/M2 | HEART RATE: 50 BPM | SYSTOLIC BLOOD PRESSURE: 138 MMHG | TEMPERATURE: 98 F

## 2018-05-18 DIAGNOSIS — Z00.00 ENCOUNTER FOR PREVENTIVE HEALTH EXAMINATION: Primary | ICD-10-CM

## 2018-05-18 DIAGNOSIS — E78.5 HYPERLIPIDEMIA, UNSPECIFIED HYPERLIPIDEMIA TYPE: ICD-10-CM

## 2018-05-18 DIAGNOSIS — E03.9 HYPOTHYROIDISM, UNSPECIFIED TYPE: ICD-10-CM

## 2018-05-18 DIAGNOSIS — N18.30 CKD (CHRONIC KIDNEY DISEASE) STAGE 3, GFR 30-59 ML/MIN: ICD-10-CM

## 2018-05-18 DIAGNOSIS — I10 ESSENTIAL HYPERTENSION: ICD-10-CM

## 2018-05-18 DIAGNOSIS — H40.9 GLAUCOMA, UNSPECIFIED GLAUCOMA TYPE, UNSPECIFIED LATERALITY: ICD-10-CM

## 2018-05-18 PROCEDURE — G0439 PPPS, SUBSEQ VISIT: HCPCS | Mod: S$GLB,,, | Performed by: PHYSICIAN ASSISTANT

## 2018-05-18 PROCEDURE — 3075F SYST BP GE 130 - 139MM HG: CPT | Mod: CPTII,S$GLB,, | Performed by: PHYSICIAN ASSISTANT

## 2018-05-18 PROCEDURE — 3078F DIAST BP <80 MM HG: CPT | Mod: CPTII,S$GLB,, | Performed by: PHYSICIAN ASSISTANT

## 2018-05-18 PROCEDURE — 99499 UNLISTED E&M SERVICE: CPT | Mod: S$GLB,,, | Performed by: PHYSICIAN ASSISTANT

## 2018-05-18 PROCEDURE — 99999 PR PBB SHADOW E&M-EST. PATIENT-LVL IV: CPT | Mod: PBBFAC,,, | Performed by: PHYSICIAN ASSISTANT

## 2018-05-18 NOTE — PROGRESS NOTES
"Mina Valentine presented for a  Medicare AWV and comprehensive Health Risk Assessment today. The following components were reviewed and updated:    · Medical history  · Family History  · Social history  · Allergies and Current Medications  · Health Risk Assessment  · Health Maintenance  · Care Team     ** See Completed Assessments for Annual Wellness Visit within the encounter summary.**       The following assessments were completed:  · Living Situation  · CAGE  · Depression Screening  · Timed Get Up and Go  · Whisper Test  · Cognitive Function Screening  · Nutrition Screening  · ADL Screening  · PAQ Screening    Vitals:    05/18/18 0945   BP: 138/60   BP Location: Left arm   Patient Position: Sitting   BP Method: Small (Manual)   Pulse: (!) 50   Temp: 97.6 °F (36.4 °C)   TempSrc: Oral   Weight: 77.8 kg (171 lb 8.3 oz)   Height: 5' 8" (1.727 m)     Body mass index is 26.08 kg/m².  Physical Exam   Constitutional: He is oriented to person, place, and time. He appears well-developed and well-nourished. No distress.   HENT:   Head: Normocephalic and atraumatic.   Neck: Normal range of motion. Neck supple. No JVD present.   Pulmonary/Chest: Effort normal.   Neurological: He is alert and oriented to person, place, and time.   Skin: He is not diaphoretic.               Diagnoses and health risks identified today and associated recommendations/orders:    Mina was seen today for health risk assessment.    Diagnoses and all orders for this visit:    Encounter for preventive health examination    Essential hypertension  Comments:  controlled, continue meds    Hypothyroidism, unspecified type  Comments:  stable, continue meds and recheck level in 2 months    Glaucoma, unspecified glaucoma type, unspecified laterality  Comments:  stable, followed by opt    Hyperlipidemia, unspecified hyperlipidemia type  Comments:  controlled, continue meds    CKD (chronic kidney disease) stage 3, GFR 30-59 ml/min  Comments:  stable, continue " to monitor        Provided Mina with a 5-10 year written screening schedule and personal prevention plan. Recommendations were developed using the USPSTF age appropriate recommendations. Education, counseling, and referrals were provided as needed. After Visit Summary printed and given to patient which includes a list of additional screenings\tests needed.    No Follow-up on file.    CHON Ellis     Patient readiness: acceptance and barriers:none    During the course of the visit the patient was educated and counseled about the following:     Hypertension:   Regular aerobic exercise.    Goals: Hypertension: Reduce Blood Pressure    Did patient meet goals/outcomes: Yes    The following self management tools provided: declined    Patient Instructions (the written plan) was given to the patient/family.     Time spent with patient: 55 minutes    Barriers to medications present (no )    Adverse reactions to current medications (no)    Over the counter medications reviewed (Yes)

## 2018-05-18 NOTE — Clinical Note
Primary Care Providers: Amber Canseco MD, MD (General)  Your patient was seen today for a HRA visit. Gap(s) in care (HEDIS gaps) have been identified during this visit that require additional testing and possible follow up.  No orders of the defined types were placed in this encounter.   These orders were placed using [facility] approved protocol and any results will be forwarded to your office for appropriate follow up. I have included a copy of my visit note; please review the note and feel free to contact me with any questions.   Thank you for allowing me to participate in the care of your patients. CHON Ellis

## 2018-05-18 NOTE — PATIENT INSTRUCTIONS
Counseling and Referral of Other Preventative  (Italic type indicates deductible and co-insurance are waived)    Patient Name: Mina Valentine  Today's Date: 5/18/2018    Health Maintenance       Date Due Completion Date    Influenza Vaccine 08/01/2018 8/18/2017    Lipid Panel 02/06/2019 2/6/2018    TETANUS VACCINE 03/18/2023 3/18/2013        No orders of the defined types were placed in this encounter.    The following information is provided to all patients.  This information is to help you find resources for any of the problems found today that may be affecting your health:                Living healthy guide: www.[facility].[place].[facility]      Understanding Diabetes: www.diabetes.org      Eating healthy: www.cdc.gov/healthyweight      CDC home safety checklist: www.cdc.gov/steadi/patient.html      Agency on Aging: www.goea.[place].[facility]      Alcoholics anonymous (AA): www.aa.org      Physical Activity: www.domonique.nih.gov/es5mwah      Tobacco use: www.quitwithusla.org

## 2018-08-14 ENCOUNTER — PATIENT MESSAGE (OUTPATIENT)
Dept: FAMILY MEDICINE | Facility: CLINIC | Age: 78
End: 2018-08-14

## 2018-08-15 ENCOUNTER — OFFICE VISIT (OUTPATIENT)
Dept: FAMILY MEDICINE | Facility: CLINIC | Age: 78
End: 2018-08-15
Payer: MEDICARE

## 2018-08-15 ENCOUNTER — LAB VISIT (OUTPATIENT)
Dept: LAB | Facility: HOSPITAL | Age: 78
End: 2018-08-15
Attending: FAMILY MEDICINE
Payer: MEDICARE

## 2018-08-15 VITALS
DIASTOLIC BLOOD PRESSURE: 70 MMHG | BODY MASS INDEX: 23.95 KG/M2 | TEMPERATURE: 98 F | OXYGEN SATURATION: 97 % | HEART RATE: 64 BPM | HEIGHT: 68 IN | RESPIRATION RATE: 14 BRPM | WEIGHT: 158.06 LBS | SYSTOLIC BLOOD PRESSURE: 140 MMHG

## 2018-08-15 DIAGNOSIS — I10 ESSENTIAL HYPERTENSION: ICD-10-CM

## 2018-08-15 DIAGNOSIS — E03.9 HYPOTHYROIDISM, UNSPECIFIED TYPE: ICD-10-CM

## 2018-08-15 DIAGNOSIS — B35.2 TINEA MANUS: ICD-10-CM

## 2018-08-15 DIAGNOSIS — N18.30 CKD (CHRONIC KIDNEY DISEASE) STAGE 3, GFR 30-59 ML/MIN: Primary | ICD-10-CM

## 2018-08-15 DIAGNOSIS — E78.5 HYPERLIPIDEMIA, UNSPECIFIED HYPERLIPIDEMIA TYPE: ICD-10-CM

## 2018-08-15 LAB
ALBUMIN SERPL BCP-MCNC: 3.7 G/DL
ALP SERPL-CCNC: 72 U/L
ALT SERPL W/O P-5'-P-CCNC: 22 U/L
ANION GAP SERPL CALC-SCNC: 9 MMOL/L
AST SERPL-CCNC: 21 U/L
BASOPHILS # BLD AUTO: 0.04 K/UL
BASOPHILS NFR BLD: 0.4 %
BILIRUB SERPL-MCNC: 1.3 MG/DL
BUN SERPL-MCNC: 22 MG/DL
CALCIUM SERPL-MCNC: 9.7 MG/DL
CHLORIDE SERPL-SCNC: 107 MMOL/L
CO2 SERPL-SCNC: 24 MMOL/L
CREAT SERPL-MCNC: 1.4 MG/DL
DIFFERENTIAL METHOD: ABNORMAL
EOSINOPHIL # BLD AUTO: 0.1 K/UL
EOSINOPHIL NFR BLD: 1.4 %
ERYTHROCYTE [DISTWIDTH] IN BLOOD BY AUTOMATED COUNT: 12.8 %
EST. GFR  (AFRICAN AMERICAN): 55.6 ML/MIN/1.73 M^2
EST. GFR  (NON AFRICAN AMERICAN): 48.1 ML/MIN/1.73 M^2
GLUCOSE SERPL-MCNC: 105 MG/DL
HCT VFR BLD AUTO: 43 %
HGB BLD-MCNC: 14.1 G/DL
IMM GRANULOCYTES # BLD AUTO: 0.03 K/UL
IMM GRANULOCYTES NFR BLD AUTO: 0.3 %
LYMPHOCYTES # BLD AUTO: 1.8 K/UL
LYMPHOCYTES NFR BLD: 19.8 %
MCH RBC QN AUTO: 31.3 PG
MCHC RBC AUTO-ENTMCNC: 32.8 G/DL
MCV RBC AUTO: 96 FL
MONOCYTES # BLD AUTO: 1.2 K/UL
MONOCYTES NFR BLD: 13.2 %
NEUTROPHILS # BLD AUTO: 6 K/UL
NEUTROPHILS NFR BLD: 64.9 %
NRBC BLD-RTO: 0 /100 WBC
PLATELET # BLD AUTO: 230 K/UL
PMV BLD AUTO: 9.9 FL
POTASSIUM SERPL-SCNC: 4.4 MMOL/L
PROT SERPL-MCNC: 7.3 G/DL
RBC # BLD AUTO: 4.5 M/UL
SODIUM SERPL-SCNC: 140 MMOL/L
T4 FREE SERPL-MCNC: 1.12 NG/DL
TSH SERPL DL<=0.005 MIU/L-ACNC: 2.35 UIU/ML
WBC # BLD AUTO: 9.3 K/UL

## 2018-08-15 PROCEDURE — 84439 ASSAY OF FREE THYROXINE: CPT

## 2018-08-15 PROCEDURE — 85025 COMPLETE CBC W/AUTO DIFF WBC: CPT

## 2018-08-15 PROCEDURE — 3077F SYST BP >= 140 MM HG: CPT | Mod: CPTII,S$GLB,, | Performed by: FAMILY MEDICINE

## 2018-08-15 PROCEDURE — 99999 PR PBB SHADOW E&M-EST. PATIENT-LVL IV: CPT | Mod: PBBFAC,,, | Performed by: FAMILY MEDICINE

## 2018-08-15 PROCEDURE — 99214 OFFICE O/P EST MOD 30 MIN: CPT | Mod: S$GLB,,, | Performed by: FAMILY MEDICINE

## 2018-08-15 PROCEDURE — 36415 COLL VENOUS BLD VENIPUNCTURE: CPT | Mod: PO

## 2018-08-15 PROCEDURE — 84443 ASSAY THYROID STIM HORMONE: CPT

## 2018-08-15 PROCEDURE — 3078F DIAST BP <80 MM HG: CPT | Mod: CPTII,S$GLB,, | Performed by: FAMILY MEDICINE

## 2018-08-15 PROCEDURE — 80053 COMPREHEN METABOLIC PANEL: CPT

## 2018-08-15 RX ORDER — TERBINAFINE HYDROCHLORIDE 250 MG/1
250 TABLET ORAL DAILY
Qty: 30 TABLET | Refills: 0 | Status: SHIPPED | OUTPATIENT
Start: 2018-08-15 | End: 2018-09-14

## 2018-08-15 NOTE — PROGRESS NOTES
Subjective:       Patient ID: Mina Valentine is a 78 y.o. male.    Chief Complaint: Follow-up (6mth f/u)    HPI  Review of Systems   Constitutional: Negative for fatigue and unexpected weight change.   Respiratory: Negative for chest tightness and shortness of breath.    Cardiovascular: Negative for chest pain, palpitations and leg swelling.   Gastrointestinal: Negative for abdominal pain.   Musculoskeletal: Negative for arthralgias.   Skin: Positive for rash.   Neurological: Negative for dizziness, syncope, light-headedness and headaches.       Patient Active Problem List   Diagnosis    HTN (hypertension)    Hyperlipidemia    Glaucoma (increased eye pressure)    Hypothyroidism    CKD (chronic kidney disease) stage 3, GFR 30-59 ml/min     Patient is here for a chronic conditions follow up.    No visits with results within 1 Month(s) from this visit.   Latest known visit with results is:   Lab Visit on 02/06/2018   Component Date Value Ref Range Status    WBC 02/06/2018 9.18  3.90 - 12.70 K/uL Final    RBC 02/06/2018 4.63  4.60 - 6.20 M/uL Final    Hemoglobin 02/06/2018 14.1  14.0 - 18.0 g/dL Final    Hematocrit 02/06/2018 43.3  40.0 - 54.0 % Final    MCV 02/06/2018 94  82 - 98 fL Final    MCH 02/06/2018 30.5  27.0 - 31.0 pg Final    MCHC 02/06/2018 32.6  32.0 - 36.0 g/dL Final    RDW 02/06/2018 12.7  11.5 - 14.5 % Final    Platelets 02/06/2018 261  150 - 350 K/uL Final    MPV 02/06/2018 9.9  9.2 - 12.9 fL Final    Immature Granulocytes 02/06/2018 0.2  0.0 - 0.5 % Final    Gran # (ANC) 02/06/2018 5.1  1.8 - 7.7 K/uL Final    Immature Grans (Abs) 02/06/2018 0.02  0.00 - 0.04 K/uL Final    Lymph # 02/06/2018 3.0  1.0 - 4.8 K/uL Final    Mono # 02/06/2018 0.9  0.3 - 1.0 K/uL Final    Eos # 02/06/2018 0.2  0.0 - 0.5 K/uL Final    Baso # 02/06/2018 0.06  0.00 - 0.20 K/uL Final    nRBC 02/06/2018 0  0 /100 WBC Final    Gran% 02/06/2018 55.4  38.0 - 73.0 % Final    Lymph% 02/06/2018 32.4  18.0 -  48.0 % Final    Mono% 02/06/2018 9.6  4.0 - 15.0 % Final    Eosinophil% 02/06/2018 1.7  0.0 - 8.0 % Final    Basophil% 02/06/2018 0.7  0.0 - 1.9 % Final    Differential Method 02/06/2018 Automated   Final    Sodium 02/06/2018 140  136 - 145 mmol/L Final    Potassium 02/06/2018 4.2  3.5 - 5.1 mmol/L Final    Chloride 02/06/2018 106  95 - 110 mmol/L Final    CO2 02/06/2018 24  23 - 29 mmol/L Final    Glucose 02/06/2018 94  70 - 110 mg/dL Final    BUN, Bld 02/06/2018 16  8 - 23 mg/dL Final    Creatinine 02/06/2018 1.2  0.5 - 1.4 mg/dL Final    Calcium 02/06/2018 9.6  8.7 - 10.5 mg/dL Final    Total Protein 02/06/2018 7.7  6.0 - 8.4 g/dL Final    Albumin 02/06/2018 3.7  3.5 - 5.2 g/dL Final    Total Bilirubin 02/06/2018 1.3* 0.1 - 1.0 mg/dL Final    Alkaline Phosphatase 02/06/2018 72  55 - 135 U/L Final    AST 02/06/2018 25  10 - 40 U/L Final    ALT 02/06/2018 25  10 - 44 U/L Final    Anion Gap 02/06/2018 10  8 - 16 mmol/L Final    eGFR if African American 02/06/2018 >60.0  >60 mL/min/1.73 m^2 Final    eGFR if non African American 02/06/2018 58.4* >60 mL/min/1.73 m^2 Final    Cholesterol 02/06/2018 207* 120 - 199 mg/dL Final    Triglycerides 02/06/2018 150  30 - 150 mg/dL Final    HDL 02/06/2018 53  40 - 75 mg/dL Final    LDL Cholesterol 02/06/2018 124.0  63.0 - 159.0 mg/dL Final    HDL/Chol Ratio 02/06/2018 25.6  20.0 - 50.0 % Final    Total Cholesterol/HDL Ratio 02/06/2018 3.9  2.0 - 5.0 Final    Non-HDL Cholesterol 02/06/2018 154  mg/dL Final    TSH 02/06/2018 4.133* 0.400 - 4.000 uIU/mL Final    T3, Free 02/06/2018 2.5  2.3 - 4.2 pg/mL Final    Free T4 02/06/2018 1.12  0.71 - 1.51 ng/dL Final    lost 12 lbs since 5/18. BP at home good.      C/o scaly rash on hand   Objective:      Physical Exam   Constitutional: He is oriented to person, place, and time. He appears well-developed and well-nourished.   Cardiovascular: Normal rate, regular rhythm and normal heart sounds.    Pulmonary/Chest: Effort normal and breath sounds normal.   Musculoskeletal: He exhibits no edema.   Neurological: He is alert and oriented to person, place, and time.   Skin: Skin is warm and dry. Rash noted.        Psychiatric: He has a normal mood and affect.   Nursing note and vitals reviewed.      Assessment:       1. CKD (chronic kidney disease) stage 3, GFR 30-59 ml/min    2. Essential hypertension    3. Hyperlipidemia, unspecified hyperlipidemia type    4. Hypothyroidism, unspecified type    5. Tinea manus        Plan:       1. CKD (chronic kidney disease) stage 3, GFR 30-59 ml/min  Stable and chronic.  Will continue to monitor q3-6 months and control chronic conditions as optimally as possible to preserve function.      2. Essential hypertension  Controlled on current medications.  Continue current medications.      3. Hyperlipidemia, unspecified hyperlipidemia type  Stable condition.  Continue current medications.  Will adjust based on lab findings or if condition changes.      4. Hypothyroidism, unspecified type  Stable condition.  Continue current medications.  Will adjust based on lab findings or if condition changes.    - CBC auto differential; Future  - Comprehensive metabolic panel; Future  - TSH; Future  - T4, free; Future    5. Tinea manus  Failed topical tx.  Treat  - terbinafine HCl (LAMISIL) 250 mg tablet; Take 1 tablet (250 mg total) by mouth once daily.  Dispense: 30 tablet; Refill: 0    Reeval 3 months or sooner prn

## 2018-10-26 ENCOUNTER — PATIENT MESSAGE (OUTPATIENT)
Dept: ADMINISTRATIVE | Facility: HOSPITAL | Age: 78
End: 2018-10-26

## 2019-05-07 ENCOUNTER — PES CALL (OUTPATIENT)
Dept: ADMINISTRATIVE | Facility: CLINIC | Age: 79
End: 2019-05-07

## 2020-09-30 ENCOUNTER — OFFICE VISIT (OUTPATIENT)
Dept: FAMILY MEDICINE | Facility: CLINIC | Age: 80
End: 2020-09-30
Payer: MEDICARE

## 2020-09-30 VITALS
BODY MASS INDEX: 25.27 KG/M2 | OXYGEN SATURATION: 97 % | SYSTOLIC BLOOD PRESSURE: 184 MMHG | HEIGHT: 68 IN | RESPIRATION RATE: 15 BRPM | HEART RATE: 75 BPM | WEIGHT: 166.75 LBS | DIASTOLIC BLOOD PRESSURE: 75 MMHG

## 2020-09-30 DIAGNOSIS — R79.9 ABNORMAL FINDING OF BLOOD CHEMISTRY, UNSPECIFIED: ICD-10-CM

## 2020-09-30 DIAGNOSIS — M72.2 PLANTAR FASCIITIS OF RIGHT FOOT: ICD-10-CM

## 2020-09-30 DIAGNOSIS — K40.90 INGUINAL HERNIA OF RIGHT SIDE WITHOUT OBSTRUCTION OR GANGRENE: ICD-10-CM

## 2020-09-30 DIAGNOSIS — Z11.59 NEED FOR HEPATITIS C SCREENING TEST: ICD-10-CM

## 2020-09-30 DIAGNOSIS — L30.1 DYSHIDROTIC ECZEMA: ICD-10-CM

## 2020-09-30 DIAGNOSIS — E03.9 ACQUIRED HYPOTHYROIDISM: ICD-10-CM

## 2020-09-30 DIAGNOSIS — I10 ESSENTIAL HYPERTENSION: ICD-10-CM

## 2020-09-30 DIAGNOSIS — Z79.899 ENCOUNTER FOR LONG-TERM CURRENT USE OF MEDICATION: ICD-10-CM

## 2020-09-30 DIAGNOSIS — Z00.00 ENCOUNTER FOR MEDICAL EXAMINATION TO ESTABLISH CARE: Primary | ICD-10-CM

## 2020-09-30 DIAGNOSIS — E78.2 MIXED HYPERLIPIDEMIA: ICD-10-CM

## 2020-09-30 DIAGNOSIS — K43.9 VENTRAL HERNIA WITHOUT OBSTRUCTION OR GANGRENE: ICD-10-CM

## 2020-09-30 PROCEDURE — 1126F AMNT PAIN NOTED NONE PRSNT: CPT | Mod: S$GLB,,, | Performed by: FAMILY MEDICINE

## 2020-09-30 PROCEDURE — 1126F PR PAIN SEVERITY QUANTIFIED, NO PAIN PRESENT: ICD-10-PCS | Mod: S$GLB,,, | Performed by: FAMILY MEDICINE

## 2020-09-30 PROCEDURE — 99999 PR PBB SHADOW E&M-EST. PATIENT-LVL V: CPT | Mod: PBBFAC,,, | Performed by: FAMILY MEDICINE

## 2020-09-30 PROCEDURE — 1159F MED LIST DOCD IN RCRD: CPT | Mod: S$GLB,,, | Performed by: FAMILY MEDICINE

## 2020-09-30 PROCEDURE — 3078F PR MOST RECENT DIASTOLIC BLOOD PRESSURE < 80 MM HG: ICD-10-PCS | Mod: CPTII,S$GLB,, | Performed by: FAMILY MEDICINE

## 2020-09-30 PROCEDURE — 99215 PR OFFICE/OUTPT VISIT, EST, LEVL V, 40-54 MIN: ICD-10-PCS | Mod: S$GLB,,, | Performed by: FAMILY MEDICINE

## 2020-09-30 PROCEDURE — 1101F PR PT FALLS ASSESS DOC 0-1 FALLS W/OUT INJ PAST YR: ICD-10-PCS | Mod: CPTII,S$GLB,, | Performed by: FAMILY MEDICINE

## 2020-09-30 PROCEDURE — 3077F PR MOST RECENT SYSTOLIC BLOOD PRESSURE >= 140 MM HG: ICD-10-PCS | Mod: CPTII,S$GLB,, | Performed by: FAMILY MEDICINE

## 2020-09-30 PROCEDURE — 1159F PR MEDICATION LIST DOCUMENTED IN MEDICAL RECORD: ICD-10-PCS | Mod: S$GLB,,, | Performed by: FAMILY MEDICINE

## 2020-09-30 PROCEDURE — 1101F PT FALLS ASSESS-DOCD LE1/YR: CPT | Mod: CPTII,S$GLB,, | Performed by: FAMILY MEDICINE

## 2020-09-30 PROCEDURE — 3077F SYST BP >= 140 MM HG: CPT | Mod: CPTII,S$GLB,, | Performed by: FAMILY MEDICINE

## 2020-09-30 PROCEDURE — 3078F DIAST BP <80 MM HG: CPT | Mod: CPTII,S$GLB,, | Performed by: FAMILY MEDICINE

## 2020-09-30 PROCEDURE — 99215 OFFICE O/P EST HI 40 MIN: CPT | Mod: S$GLB,,, | Performed by: FAMILY MEDICINE

## 2020-09-30 PROCEDURE — 99999 PR PBB SHADOW E&M-EST. PATIENT-LVL V: ICD-10-PCS | Mod: PBBFAC,,, | Performed by: FAMILY MEDICINE

## 2020-09-30 RX ORDER — TRIAMCINOLONE ACETONIDE 1 MG/G
OINTMENT TOPICAL 2 TIMES DAILY
Qty: 30 G | Refills: 1 | Status: SHIPPED | OUTPATIENT
Start: 2020-09-30 | End: 2020-09-30

## 2020-09-30 RX ORDER — LATANOPROST 50 UG/ML
SOLUTION/ DROPS OPHTHALMIC
COMMUNITY
Start: 2020-08-19

## 2020-09-30 RX ORDER — TRIAMCINOLONE ACETONIDE 1 MG/G
OINTMENT TOPICAL 2 TIMES DAILY
Qty: 30 G | Refills: 1 | Status: SHIPPED | OUTPATIENT
Start: 2020-09-30 | End: 2022-06-13

## 2020-09-30 RX ORDER — INFLUENZA A VIRUS A/MICHIGAN/45/2015 X-275 (H1N1) ANTIGEN (FORMALDEHYDE INACTIVATED), INFLUENZA A VIRUS A/SINGAPORE/INFIMH-16-0019/2016 IVR-186 (H3N2) ANTIGEN (FORMALDEHYDE INACTIVATED), INFLUENZA B VIRUS B/PHUKET/3073/2013 ANTIGEN (FORMALDEHYDE INACTIVATED), AND INFLUENZA B VIRUS B/MARYLAND/15/2016 BX-69A ANTIGEN (FORMALDEHYDE INACTIVATED) 60; 60; 60; 60 UG/.7ML; UG/.7ML; UG/.7ML; UG/.7ML
INJECTION, SUSPENSION INTRAMUSCULAR
COMMUNITY
Start: 2020-09-11 | End: 2020-11-25

## 2020-09-30 RX ORDER — PRAVASTATIN SODIUM 40 MG/1
40 TABLET ORAL DAILY
COMMUNITY
Start: 2020-08-31 | End: 2020-11-17 | Stop reason: SDUPTHER

## 2020-09-30 NOTE — PATIENT INSTRUCTIONS
Hernia (Adult)    A hernia can happen when there is a weakness or defect in the wall of the abdomen or groin. Intestines or nearby tissues may move from their usual location and push through the weakness in the wall. This can cause a hernia (bulge) you may see or feel.  Causes and Risk Factors   A hernia may be present at birth. Or it may be caused by the wear and tear of daily living. Certain factors can make a hernia more likely. These can include:  · Heavy lifting  · Straining, whether from lifting, movement, or constipation  · Chronic cough  · Injury to the abdominal wall  · Excess weight  · Pregnancy  · Prior surgery  · Older age  · Family history of hernia  Symptoms  Symptoms of a hernia may come on suddenly. Or they may appear slowly over time. Some common symptoms include:  · Bulge in the groin area, around the navel, or in the scrotum (the bulge may get bigger when you stand and go away when you lie down)  · Pain or pressure around the bulge  · Pain during activities such as lifting, coughing, or sneezing  · A feeling of weakness or pressure in the groin  · Pain or swelling in the scrotum  Types of hernias  There are different types of hernia. The type you have depends on its location:  · Inguinal: This type is in the groin or scrotum. It is more common in men.  · Femoral: This type is in the groin, upper thigh (where the leg bends), or labia. It is more common in women.  · Ventral: This type is in the abdominal wall.  · Umbilical: This type occurs around the navel (belly button).  · Incisional: This type occurs at the site of a previous surgery.  The condition of the hernia can help determine how urgently it needs to be treated.  · Reducible: It goes back in by itself, or it can be pushed back in.  · Irreducible: It cant be pushed back in.  · Incarcerated/Strangulated: The intestine is trapped (incarcerated). If this happens, you wont be able to push the bulge back in. If the incarcerated hernia isnt  treated, it may become strangulated. This means the area loses blood supply and the tissue may die. This requires emergency surgery! Treatment is needed right away!  In most cases, a hernia will not heal on its own. Surgery is usually needed to repair the defect in the abdominal wall or groin. Youll be told more about surgery, if needed.  If your symptoms are not severe, treatment may sometimes be delayed. In such cases, regular follow-up visits with the provider will be needed. Youll be asked to keep track of your symptoms and to watch for signs of more serious problems. You may also be given guidelines similar to the home care instructions below.  Home Care  To help keep a hernia from getting worse, you may be advised to:  · Avoid heavy lifting and straining as directed.  · Take steps to prevent constipation, such as eating more fiber and drinking more water. This may help reduce straining that can occur when having a bowel movement. Reducing straining may help keep your symptoms from getting worse.  · Maintain a healthy weight or lose excess weight. This can help reduce strain on abdominal muscles and tissues.  · Stop smoking. This can help prevent coughing that may also strain abdominal muscles and tissues.  Follow-up care  Follow up with your healthcare provider, or as directed. If imaging tests were done, they will be reviewed a doctor. You will be told the results and any new findings that may affect your care.  When to seek medical advice  Call your healthcare provider right away if any of these occur:  · Hernia hardens, swells, or grows larger  · Hernia can no longer be pushed back in  · Pain moves to the lower right abdomen (just below the waistline), or spreads to the back  Call 911  Call 911 right away if any of these occur:  · Nausea and vomiting  · Severe pain, redness, or tenderness in the area near the hernia  · Pain worsens quickly and doesnt get better  · Inability to have a bowel movement or  pass gas  · Fever of 100.4°F (38°C) or higher  · Trouble breathing  · Fainting  · Rapid heart rate  · Vomiting blood  · Large amounts of blood in stool  Date Last Reviewed: 6/9/2015 © 2000-2017 [employer]. [address] [place] [address], [place] [address]. All rights reserved. This information is not intended as a substitute for professional medical care. Always follow your healthcare professional's instructions.          Plantar Fasciitis  Plantar fasciitis is a painful swelling of the plantar fascia. The plantar fascia is a thick, fibrous layer of tissue. It covers the bones on the bottom of your foot. And it supports the foot bones in an arched position.  This can happen gradually or suddenly. It usually affects one foot at a time. Heel pain can be sharp, like a knife sticking into the bottom of your foot. You may feel pain after exercising, long-distance jogging, stair climbing, long periods of standing, or after standing up.  Risk factors include: non-active lifestyle, arthritis, diabetes, obesity or recent weight gain, flat foot, high arch. Wearing high heels, loose shoes, or shoes with poor arch support for long periods of time adds to the risk. This problem is commonly found in runners and dancers. It also found in people who stand on hard surfaces for long periods of time.  Foot pain from this condition is usually worse in the morning. But it improves with walking. By the end of the day there may be a dull aching. Treatment requires short-term rest and controlling swelling. It may take up to 9 months before all symptoms go away. Rarely, a steroid injection into the foot, or surgery, may be needed.  Home care  · If you are overweight, lose weight to help healing.  · Choose supportive shoes with good arch support and shock absorbency. Replace athletic shoes when they become worn out. Dont walk or run barefoot.  · Premade or custom-fitted shoe inserts may be helpful. Inserts made of silicone seem to  be the most effective. Custom-made inserts can be provided by a podiatrist or foot specialist, physical therapist, or orthopedist.  · Premade or custom-made night splints keep the heel stretched out while you sleep. They may prevent morning pain.  · Avoid activities that stress the feet: jogging, prolonged standing or walking, contact sports, etc.  · First thing in the morning and before sports, stretch the bottom of your feet. Gently flex your ankle so the toes move toward your knee.  · Icing may help control heel pain. Apply an ice pack to the heel for 10-20 minutes as a preventive. Or ice your heel after a severe flare-up of symptoms. You may repeat this every 1-2 hours as needed.  · You may use over-the-counter pain medicine to control pain, unless another medicine was prescribed. Anti-inflammatory pain medicines, such as ibuprofen or naproxen, may work better than acetaminophen. If you have chronic liver or kidney disease or ever had a stomach ulcer or GI bleeding, talk with your healthcare provider before using these medicines.  Follow-up care  Follow up with your healthcare provider, physical therapist, or podiatrist or foot specialist as advised.  Call for an appointment if pain worsens or there is no relief after a few weeks of home treatment. Shoe inserts, a night splint, or a special boot may be required.  If X-rays were taken, you will be told of any new findings that may affect your care.  When to seek medical advice  Call your healthcare provider right away if any of these occur:  · Foot swelling  · Redness with increasing pain  Date Last Reviewed: 11/21/2015 © 2000-2017 [employer] [address] [place] [address], [place] [address]. All rights reserved. This information is not intended as a substitute for professional medical care. Always follow your healthcare professional's instructions.      Treating Plantar Fasciitis  First, your healthcare provider tries to determine the cause of your  problem in order to suggest ways to relieve pain. If your pain is due to poor foot mechanics, custom-made shoe inserts (orthoses) may help.    Reduce symptoms  · To relieve mild symptoms, try aspirin, ibuprofen, or other medicines as directed. Rubbing ice on the affected area may also help.  · To reduce severe pain and swelling, your healthcare provider may prescribe pills or injections or a walking cast in some instances. Physical therapy, such as ultrasound or a daily stretching program, may also be recommended. Surgery is rarely required.  · To reduce symptoms caused by poor foot mechanics, your foot may be taped. This supports the arch and temporarily controls movement. Night splints may also help by stretching the fascia.  Control movement  If taping helps, your healthcare provider may prescribe orthoses. Built from plaster casts of your feet, these inserts control the way your foot moves. As a result, your symptoms should go away.  Reduce overuse  Every time your foot strikes the ground, the plantar fascia is stretched. You can reduce the strain on the plantar fascia and the possibility of overuse by following these suggestions:  · Lose any excess weight.  · Avoid running on hard or uneven ground.  · Use orthoses at all times in your shoes and house slippers.  If surgery is needed  Your healthcare provider may consider surgery if other types of treatment don't control your pain. During surgery, the plantar fascia is partially cut to release tension. As you heal, fibrous tissue fills the space between the heel bone and the plantar fascia.   Date Last Reviewed: 10/14/2015  © 2080-8534 [employer]. [address] [place] [address], [place] [address]. All rights reserved. This information is not intended as a substitute for professional medical care. Always follow your healthcare professional's instructions.

## 2020-09-30 NOTE — PROGRESS NOTES
"  Subjective:       Patient ID: Mina Valentine is a 80 y.o. male.    Chief Complaint: Establish Care (possible hernia)    Presents to establish care. [facility]. Excited to have [facility] near his home of [place].    About a month ago he noticed a bulge in RLQ as he looked the mirror when he was shaving. "I lost about 25 pounds recently and have some sagging skin, so I don't really know what it is." Not painful or tender to touch. More noticeable when he is standing, and goes away when he lays flat. No problems stooling, no problems voiding. No F/C or N/V/D or constipation.    C/o L foot pain, located in his heel area. Has done some stretches but not helping too much. Has good shoes with arch support.    Abdominal Pain  Pertinent negatives include no arthralgias, constipation, diarrhea, headaches, hematuria or vomiting.     Depression Patient Health Questionnaire 5/18/2018 10/18/2016 3/31/2015   Over the last two weeks how often have you been bothered by little interest or pleasure in doing things 0 0 0   Over the last two weeks how often have you been bothered by feeling down, depressed or hopeless 0 0 0   PHQ-2 Total Score 0 0 0   Over the last two weeks how often have you been bothered by trouble falling or staying asleep, or sleeping too much - 0 0   Over the last two weeks how often have you been bothered by feeling tired or having little energy - 0 0   Over the last two weeks how often have you been bothered by a poor appetite or overeating - 0 0   Over the last two weeks how often have you been bothered by feeling bad about yourself - or that you are a failure or have let yourself or your family down - 0 0   Over the last two weeks how often have you been bothered by trouble concentrating on things, such as reading the newspaper or watching television - 0 0   Over the last two weeks how often have you been bothered by moving or speaking so slowly that other people could have noticed. " Or the opposite - being so fidgety or restless that you have been moving around a lot more than usual. - 0 0   Over the last two weeks how often have you been bothered by thoughts that you would be better off dead, or of hurting yourself - 0 0   If you checked off any problems, how difficult have these problems made it for you to do your work, take care of things at home or get along with other people? - Not difficult at all Not difficult at all   Total Score - 0 0       Review of Systems   Constitutional: Negative for activity change and unexpected weight change.   HENT: Negative for hearing loss, rhinorrhea and trouble swallowing.    Eyes: Negative for discharge and visual disturbance.   Respiratory: Negative for chest tightness and wheezing.    Cardiovascular: Negative for chest pain and palpitations.   Gastrointestinal: Positive for abdominal pain. Negative for blood in stool, constipation, diarrhea and vomiting.   Endocrine: Negative for polydipsia and polyuria.   Genitourinary: Negative for difficulty urinating, hematuria and urgency.   Musculoskeletal: Negative for arthralgias, joint swelling and neck pain.   Neurological: Negative for weakness and headaches.   Psychiatric/Behavioral: Negative for confusion and dysphoric mood.   All other systems reviewed and are negative.        Past Medical History:   Diagnosis Date    Basal cell carcinoma     Basal cell carcinoma of face 2014, mohs    left medial cheek, right upper cheek    BCC (basal cell carcinoma of skin) 1/23/2014    BCC (basal cell carcinoma), face     Deviated septum     Glaucoma     History of prostate cancer 2/10/2016    HTN (hypertension)     Hyperlipidemia     Ocular hypertension 10/18/2016    Prostate cancer     seed implants 2006    Thyroid disease     hypothyroidism    Umbilical hernia      Past Surgical History:   Procedure Laterality Date    brachytherapy for prostate cancer  2006    [place], [place]    CARDIOVASCULAR STRESS  "TEST      WNL 1 1/2 yrs ago    COLONOSCOPY      no polyps approx.     EYE SURGERY      cataract surgery    hydrocele  1960    UMBILICAL HERNIA REPAIR      x2     Family History   Problem Relation Age of Onset    Dementia Mother     Coronary artery disease Father 58         MI    Diabetes Father         type 1    Heart disease Father     Cancer Brother 89        brain cancer    Heart disease Sister         a fib    No Known Problems Daughter     No Known Problems Son     No Known Problems Sister     No Known Problems Daughter        Review of patient's allergies indicates:   Allergen Reactions    Penicillins      Other reaction(s): Flushing (skin)       Current Outpatient Medications:     latanoprost 0.005 % ophthalmic solution, , Disp: , Rfl:     levothyroxine (SYNTHROID) 75 MCG tablet, TAKE 1 TABLET EVERY DAY ( NEED  APPOINTMENT AND LAB ), Disp: 90 tablet, Rfl: 3    loratadine (CLARITIN) 5 mg chewable tablet, Take 5 mg by mouth once daily., Disp: , Rfl:     losartan (COZAAR) 100 MG tablet, Take 1 tablet (100 mg total) by mouth once daily., Disp: 90 tablet, Rfl: 3    omega 3-dha-epa-fish oil (FISH OIL) 120-180-500 mg Cap, , Disp: , Rfl:     pravastatin (PRAVACHOL) 40 MG tablet, Take 40 mg by mouth once daily., Disp: , Rfl:     FLUZONE HIGHDOSE QUAD 20-21  mcg/0.7 mL Syrg, PHARMACIST ADMINISTERED IMMUNIZATION ADMINISTERED AT TIME OF DISPENSING, Disp: , Rfl:     pravastatin (PRAVACHOL) 20 MG tablet, TAKE 1 TABLET ONE TIME DAILY, Disp: 90 tablet, Rfl: 3    triamcinolone acetonide 0.1% (KENALOG) 0.1 % ointment, Apply topically 2 (two) times daily. for 14 days, Disp: 30 g, Rfl: 1      Objective:      BP (!) 184/75 (BP Location: Left arm, Patient Position: Sitting, BP Method: Medium (Automatic))   Pulse 75   Resp 15   Ht 5' 8" (1.727 m)   Wt 75.7 kg (166 lb 12.5 oz)   SpO2 97%   BMI 25.36 kg/m²   Physical Exam  Vitals signs and nursing note reviewed.   Constitutional:       " General: He is not in acute distress.     Appearance: Normal appearance. He is well-developed and normal weight. He is not ill-appearing, toxic-appearing or diaphoretic.   HENT:      Head: Normocephalic and atraumatic.      Right Ear: External ear normal.      Left Ear: External ear normal.      Nose: Nose normal. No congestion.      Mouth/Throat:      Mouth: Mucous membranes are moist.      Pharynx: Oropharynx is clear. No oropharyngeal exudate or posterior oropharyngeal erythema.   Eyes:      General: No scleral icterus.        Right eye: No discharge.         Left eye: No discharge.      Extraocular Movements: Extraocular movements intact.      Conjunctiva/sclera: Conjunctivae normal.      Pupils: Pupils are equal, round, and reactive to light.   Neck:      Musculoskeletal: Normal range of motion and neck supple.      Thyroid: No thyromegaly.      Vascular: No JVD.      Trachea: No tracheal deviation.   Cardiovascular:      Rate and Rhythm: Normal rate and regular rhythm.      Heart sounds: Normal heart sounds.   Pulmonary:      Effort: Pulmonary effort is normal. No respiratory distress.      Breath sounds: Normal breath sounds. No wheezing.   Abdominal:      General: Abdomen is flat. Bowel sounds are normal. There is no distension.      Palpations: Abdomen is soft. There is no mass.      Tenderness: There is abdominal tenderness. There is no right CVA tenderness, left CVA tenderness, guarding or rebound.      Hernia: A hernia is present.   Musculoskeletal: Normal range of motion.         General: No deformity.   Lymphadenopathy:      Cervical: No cervical adenopathy.   Skin:     General: Skin is warm and dry.      Capillary Refill: Capillary refill takes less than 2 seconds.      Coloration: Skin is not pale.      Findings: No erythema or rash.   Neurological:      Mental Status: He is alert and oriented to person, place, and time.   Psychiatric:         Behavior: Behavior normal.         Thought Content:  Thought content normal.         Judgment: Judgment normal.         Assessment:       1. Encounter for medical examination to establish care    2. Inguinal hernia of right side without obstruction or gangrene    3. Ventral hernia without obstruction or gangrene    4. Essential hypertension    5. Acquired hypothyroidism    6. Mixed hyperlipidemia    7. Dyshidrotic eczema    8. Plantar fasciitis of right foot    9. Encounter for long-term current use of medication    10. Abnormal finding of blood chemistry, unspecified     11. Need for hepatitis C screening test        Plan:       Encounter for medical examination to establish care    Inguinal hernia of right side without obstruction or gangrene  -     Ambulatory referral/consult to General Surgery; Future; Expected date: 10/07/2020    Ventral hernia without obstruction or gangrene  -     Ambulatory referral/consult to General Surgery; Future; Expected date: 10/07/2020    Essential hypertension  -     CBC auto differential; Future; Expected date: 09/30/2020  -     Comprehensive metabolic panel; Future; Expected date: 09/30/2020  -     Ferritin; Future; Expected date: 09/30/2020  -     Iron and TIBC; Future; Expected date: 09/30/2020  -     Vitamin B12; Future; Expected date: 09/30/2020  -     Vitamin D; Future; Expected date: 09/30/2020  -     Urinalysis Microscopic; Future; Expected date: 10/14/2020    Acquired hypothyroidism  -     TSH; Future; Expected date: 09/30/2020  -     T4, free; Future; Expected date: 09/30/2020  -     T3, free; Future; Expected date: 09/30/2020    Mixed hyperlipidemia  -     Lipid Panel; Future; Expected date: 09/30/2020    Dyshidrotic eczema  -     Discontinue: triamcinolone acetonide 0.1% (KENALOG) 0.1 % ointment; Apply topically 2 (two) times daily. for 14 days  Dispense: 30 g; Refill: 1  -     triamcinolone acetonide 0.1% (KENALOG) 0.1 % ointment; Apply topically 2 (two) times daily. for 14 days  Dispense: 30 g; Refill: 1    Plantar  fasciitis of right foot    Encounter for long-term current use of medication  -     CBC auto differential; Future; Expected date: 09/30/2020  -     Comprehensive metabolic panel; Future; Expected date: 09/30/2020  -     Ferritin; Future; Expected date: 09/30/2020  -     Hemoglobin A1C; Future; Expected date: 09/30/2020  -     Iron and TIBC; Future; Expected date: 09/30/2020  -     TSH; Future; Expected date: 09/30/2020  -     Vitamin B12; Future; Expected date: 09/30/2020  -     Vitamin D; Future; Expected date: 09/30/2020  -     Urinalysis Microscopic; Future; Expected date: 10/14/2020    Abnormal finding of blood chemistry, unspecified   -     Lipid Panel; Future; Expected date: 09/30/2020  -     Ferritin; Future; Expected date: 09/30/2020  -     Iron and TIBC; Future; Expected date: 09/30/2020    Need for hepatitis C screening test  -     Hepatitis C Antibody; Future; Expected date: 09/30/2020    Labs in a couple of weeks, f/u visit with me in a month or so.  Referral to general surgery for further evaluation of likely R inguinal hernia. Ventral hernia not worrisome or bothersome currently.    Strict return precautions reviewed and patient verbalized understanding. Risks, benefits, and alternatives to the plan were reviewed in detail and all questions answered to the patient's satisfaction. 55 minutes were spent with patient, >50% of time based on counseling and coordination of care.          Follow up in about 4 weeks (around 10/28/2020) for fasting labs in 2 weeks, f/u 4 weeks.

## 2020-10-06 ENCOUNTER — TELEPHONE (OUTPATIENT)
Dept: SURGERY | Facility: CLINIC | Age: 80
End: 2020-10-06
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
## 2020-10-06 NOTE — TELEPHONE ENCOUNTER
----- Message from Atilio Enrique sent at 10/6/2020  9:17 AM CDT -----  Type:  Sooner Apoointment Request    Caller is requesting a sooner appointment.  Caller declined first available appointment listed below.  Caller will not accept being placed on the waitlist and is requesting a message be sent to doctor.    Name of Caller:  patient  When is the first available appointment?  11/02  Symptoms:    Best Call Back Number:  [phone]  Additional Information:  requesting a call back to confirm appt

## 2020-10-09 ENCOUNTER — OFFICE VISIT (OUTPATIENT)
Dept: SURGERY | Facility: CLINIC | Age: 80
End: 2020-10-09
Payer: MEDICARE

## 2020-10-09 VITALS
SYSTOLIC BLOOD PRESSURE: 184 MMHG | DIASTOLIC BLOOD PRESSURE: 76 MMHG | HEIGHT: 68 IN | RESPIRATION RATE: 13 BRPM | OXYGEN SATURATION: 98 % | TEMPERATURE: 98 F | HEART RATE: 73 BPM | BODY MASS INDEX: 25.16 KG/M2 | WEIGHT: 166 LBS

## 2020-10-09 DIAGNOSIS — E78.2 MIXED HYPERLIPIDEMIA: ICD-10-CM

## 2020-10-09 DIAGNOSIS — E03.9 ACQUIRED HYPOTHYROIDISM: ICD-10-CM

## 2020-10-09 DIAGNOSIS — I10 ESSENTIAL HYPERTENSION: ICD-10-CM

## 2020-10-09 DIAGNOSIS — K40.90 NON-RECURRENT UNILATERAL INGUINAL HERNIA WITHOUT OBSTRUCTION OR GANGRENE: Primary | ICD-10-CM

## 2020-10-09 PROCEDURE — 3077F SYST BP >= 140 MM HG: CPT | Mod: CPTII,S$GLB,, | Performed by: SURGERY

## 2020-10-09 PROCEDURE — 1101F PR PT FALLS ASSESS DOC 0-1 FALLS W/OUT INJ PAST YR: ICD-10-PCS | Mod: CPTII,S$GLB,, | Performed by: SURGERY

## 2020-10-09 PROCEDURE — 1126F PR PAIN SEVERITY QUANTIFIED, NO PAIN PRESENT: ICD-10-PCS | Mod: S$GLB,,, | Performed by: SURGERY

## 2020-10-09 PROCEDURE — 1101F PT FALLS ASSESS-DOCD LE1/YR: CPT | Mod: CPTII,S$GLB,, | Performed by: SURGERY

## 2020-10-09 PROCEDURE — 3077F PR MOST RECENT SYSTOLIC BLOOD PRESSURE >= 140 MM HG: ICD-10-PCS | Mod: CPTII,S$GLB,, | Performed by: SURGERY

## 2020-10-09 PROCEDURE — 1159F PR MEDICATION LIST DOCUMENTED IN MEDICAL RECORD: ICD-10-PCS | Mod: S$GLB,,, | Performed by: SURGERY

## 2020-10-09 PROCEDURE — 3078F PR MOST RECENT DIASTOLIC BLOOD PRESSURE < 80 MM HG: ICD-10-PCS | Mod: CPTII,S$GLB,, | Performed by: SURGERY

## 2020-10-09 PROCEDURE — 1126F AMNT PAIN NOTED NONE PRSNT: CPT | Mod: S$GLB,,, | Performed by: SURGERY

## 2020-10-09 PROCEDURE — 99205 OFFICE O/P NEW HI 60 MIN: CPT | Mod: S$GLB,,, | Performed by: SURGERY

## 2020-10-09 PROCEDURE — 1159F MED LIST DOCD IN RCRD: CPT | Mod: S$GLB,,, | Performed by: SURGERY

## 2020-10-09 PROCEDURE — 3078F DIAST BP <80 MM HG: CPT | Mod: CPTII,S$GLB,, | Performed by: SURGERY

## 2020-10-09 PROCEDURE — 99205 PR OFFICE/OUTPT VISIT, NEW, LEVL V, 60-74 MIN: ICD-10-PCS | Mod: S$GLB,,, | Performed by: SURGERY

## 2020-10-09 RX ORDER — LIDOCAINE HYDROCHLORIDE 10 MG/ML
1 INJECTION, SOLUTION EPIDURAL; INFILTRATION; INTRACAUDAL; PERINEURAL ONCE
Status: CANCELLED | OUTPATIENT
Start: 2020-10-09 | End: 2020-10-09

## 2020-10-09 RX ORDER — SODIUM CHLORIDE, SODIUM LACTATE, POTASSIUM CHLORIDE, CALCIUM CHLORIDE 600; 310; 30; 20 MG/100ML; MG/100ML; MG/100ML; MG/100ML
INJECTION, SOLUTION INTRAVENOUS CONTINUOUS
Status: CANCELLED | OUTPATIENT
Start: 2020-10-09

## 2020-10-09 NOTE — LETTER
October 9, 2020      Blanca Hatch MD  [address] [place] [address]           [facility] - General Surgery  [address] [place] [address]  Phone: [phone]  Fax: [phone]          Patient: Mina Valentine   MR Number: [id number]   YOB: 1940   Date of Visit: 10/9/2020       Dear Dr. Blanca Hatch:    Thank you for referring Mina Valentine to me for evaluation. Attached you will find relevant portions of my assessment and plan of care.    If you have questions, please do not hesitate to call me. I look forward to following Mina Valentine along with you.    Sincerely,    Anthony Lezama MD    Enclosure  CC:  No Recipients    If you would like to receive this communication electronically, please contact [email or web address] or [phone] to request more information on [employer] Link access.    For providers and/or their staff who would like to refer a patient to [facility], please contact us through our one-stop-shop provider referral line, [facility], at [phone].    If you feel you have received this communication in error or would no longer like to receive these types of communications, please e-mail [email or web address]

## 2020-10-09 NOTE — H&P
[facility] - General Surgery  General Surgery  H&P      Patient Name: Mina Valentine  MRN: [id number]     Chief Complaint:  I am having my hernia fixed      HPI:  Mr. Valentine presents today to proceed with a right inguinal hernia repair.  He was seen in the Surgery Clinic on 10/9/2020 as a consult from Dr. Layton.  He was sent in consultation for a suspected right inguinal hernia.  He noted a asymmetry in the right groin compared with the left groin one morning recently while shaving.  On palpation he noted a bulge that he could reduce with pressure.  No nausea or vomiting.  No diarrhea or constipation.  No melena, hematochezia, hematemesis.  No weight loss.  There is a dull discomfort in the right groin associated with the bulge.  Pain disappears if the bulge is not present.  Pain and bulge are aggravated by strenuous activities and alleviated with rest.  He has recently increased activities with strenuous physical exercise for health purposes, he noted the asymmetry soon thereafter.  No other aggravating or alleviating factors.  No other associated symptoms.      Allergies & Meds:     Allergen Reactions    Penicillins      Other reaction(s): Flushing (skin)       Current Outpatient Medications   Medication Sig Dispense Refill    latanoprost 0.005 % ophthalmic solution       levothyroxine (SYNTHROID) 75 MCG tablet TAKE 1 TABLET EVERY DAY ( NEED  APPOINTMENT AND LAB ) 90 tablet 3    loratadine (CLARITIN) 5 mg chewable tablet Take 5 mg by mouth once daily.      losartan (COZAAR) 100 MG tablet Take 1 tablet (100 mg total) by mouth once daily. 90 tablet 3    omega 3-dha-epa-fish oil (FISH OIL) 120-180-500 mg Cap       pravastatin (PRAVACHOL) 20 MG tablet TAKE 1 TABLET ONE TIME DAILY 90 tablet 3    pravastatin (PRAVACHOL) 40 MG tablet Take 40 mg by mouth once daily.      triamcinolone acetonide 0.1% (KENALOG) 0.1 % ointment Apply topically 2 (two) times daily. 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 2009    EYE SURGERY      cataract surgery    hydrocele  1960    UMBILICAL HERNIA REPAIR      x2       Family History   Problem Relation Age of Onset    Dementia Mother     Coronary artery disease Father 58         MI    Diabetes Father         type 1    Heart disease Father     Cancer Brother 89        brain cancer    Heart disease Sister         a fib    No Known Problems Daughter     No Known Problems Son     No Known Problems Sister     No Known Problems Daughter        Social History     Tobacco Use    Smoking status: Former Smoker     Quit date: 1985     Years since quittin.7    Smokeless tobacco: Never Used   Substance Use Topics    Alcohol use: No     Frequency: Monthly or less     Drinks per session: 1 or 2     Binge frequency: Never    Drug use: No       Review of Systems   Constitutional: Negative for appetite change, chills, fatigue, fever and unexpected weight change.   HENT: Negative for congestion, dental problem, ear pain, mouth sores, postnasal drip, rhinorrhea, sore throat, tinnitus, trouble swallowing and voice change.    Eyes:  Negative for photophobia, pain, discharge and visual disturbance.   Respiratory: Negative for cough, chest tightness, shortness of breath and wheezing.    Cardiovascular: Negative for chest pain, palpitations and leg swelling.   Gastrointestinal: Negative for abdominal pain, blood in stool, constipation, diarrhea, nausea and vomiting.   Endocrine: Negative for cold intolerance, heat intolerance, polydipsia, polyphagia and polyuria.   Genitourinary: Negative for difficulty urinating, dysuria, flank pain, frequency, hematuria and urgency.   Musculoskeletal: Negative for arthralgias, joint swelling and myalgias.   Skin: Negative for color change and rash.   Allergic/Immunologic: Negative for immunocompromised state.   Neurological: Negative for dizziness, tremors, seizures, syncope, speech difficulty, weakness, numbness and headaches.   Hematological: Negative for adenopathy. Does not bruise/bleed easily.   Psychiatric/Behavioral: Negative for agitation, confusion, hallucinations, self-injury and suicidal ideas. The patient is not nervous/anxious.             Physical Exam  Constitutional:       General: He is not in acute distress.     Appearance: Normal appearance. He is well-developed. He is not ill-appearing or toxic-appearing.      Comments: Body mass index is 25.24 kg/m².   HENT:      Head: Normocephalic and atraumatic. No contusion.      Right Ear: Hearing and external ear normal. No drainage or tenderness.      Left Ear: Hearing and external ear normal. No drainage or tenderness.      Nose: Nose normal. No rhinorrhea.   Eyes:      General: Lids are normal.         Right eye: No discharge.         Left eye: No discharge.      Conjunctiva/sclera: Conjunctivae normal.      Right eye: Right conjunctiva is not injected. No exudate.     Left eye: Left conjunctiva is not injected. No exudate.     Pupils: Pupils are equal, round, and reactive to light.   Neck:      Musculoskeletal: Normal range of motion. No edema.       Thyroid: No thyroid mass or thyromegaly.      Vascular: No carotid bruit or JVD.      Trachea: Trachea and phonation normal. No tracheal deviation.   Cardiovascular:      Rate and Rhythm: Normal rate and regular rhythm.      Chest Wall: PMI is not displaced.      Heart sounds: Normal heart sounds, S1 normal and S2 normal. No murmur. No friction rub. No gallop.    Pulmonary:      Effort: Pulmonary effort is normal. No tachypnea, accessory muscle usage, respiratory distress or retractions.      Breath sounds: Normal breath sounds.   Chest:      Chest wall: No mass, tenderness or crepitus.      Breasts: Breasts are symmetrical.         Right: No inverted nipple, mass, nipple discharge or skin change.         Left: No inverted nipple, mass, nipple discharge or skin change.   Abdominal:      General: Bowel sounds are normal. There is no distension or abdominal bruit.      Palpations: Abdomen is soft. There is no fluid wave or mass.      Tenderness: There is no abdominal tenderness.      Hernia: A hernia is present. Hernia is present in the right inguinal area. There is no hernia in the umbilical area, ventral area or left inguinal area.   Genitourinary:     Penis: Normal.       Scrotum/Testes: Normal.         Right: Mass or swelling not present.         Left: Mass or swelling not present.   Musculoskeletal:      Cervical back: Normal.      Thoracic back: Normal.      Lumbar back: Normal.      Right upper arm: Normal.      Left upper arm: Normal.      Right forearm: Normal.      Left forearm: Normal.      Right hand: Normal.      Left hand: Normal.      Right upper leg: Normal.      Left upper leg: Normal.      Right lower leg: Normal.      Left lower leg: Normal.      Right foot: Normal.      Left foot: Normal.   Lymphadenopathy:      Head:      Right side of head: No submental or submandibular adenopathy.      Left side of head: No submental or submandibular adenopathy.      Cervical: No cervical adenopathy.      Upper  Body:      Right upper body: No supraclavicular adenopathy.      Left upper body: No supraclavicular adenopathy.      Lower Body: No right inguinal adenopathy. No left inguinal adenopathy.   Skin:     General: Skin is warm and dry.      Findings: No lesion or rash.      Nails: There is no clubbing.     Neurological:      Mental Status: He is alert and oriented to person, place, and time. He is not disoriented.      GCS: GCS eye subscore is 4. GCS verbal subscore is 5. GCS motor subscore is 6.      Cranial Nerves: No cranial nerve deficit.      Sensory: No sensory deficit.      Motor: No tremor or atrophy.      Coordination: Coordination normal.      Gait: Gait normal.   Psychiatric:         Attention and Perception: He is attentive.         Speech: Speech normal.         Behavior: Behavior normal.         Thought Content: Thought content normal.             Medical Records Review:  Pending    Assessment:       Symptomatic right inguinal hernia.  Options at this time include were not limited to conventional hernia repair, laparoscopic hernia repair, expectant management, nonoperative management, second opinion, etc.  Multiple comorbid issues ( hypertension, hypothyroidism, hyperlipidemia ) increase the complexity of required perioperative evaluation & management, risks of complications, and likely will increase the difficulty and complexity of postoperative care, etc.  These issues must be factored in to preoperative evaluation and perioperative management.    1. Non-recurrent unilateral inguinal hernia without obstruction or gangrene    2. Essential hypertension    3. Mixed hyperlipidemia    4. Acquired hypothyroidism          Plan:     Non-recurrent unilateral inguinal hernia without obstruction or gangrene  -     Ambulatory referral/consult to General Surgery  -     Case Request Operating Room: REPAIR, HERNIA, INGUINAL, WITHOUT HISTORY OF PRIOR REPAIR, AGE 5 YEARS OR OLDER  -     EKG 12-lead; Future  -      COVID-19 Routine Screening  -     COVID-19 Routine Screening; Future; Expected date: 10/09/2020    Essential hypertension    Mixed hyperlipidemia    Acquired hypothyroidism        Follow up around 11/25/2020 for routine postop evaluation.    Counseling/Medical Decision Making:  Mina Valentine was informed of the results of his evaluation thus far and the differential diagnosis.   All treatment options as well as the risks, benefits, possible complications, details up, and indications for each option were also discussed in great detail.   Diagnoses discussed included but were not limited to: indirect inguinal hernia, direct inguinal hernia, femoral hernia, sliding hernia, groin strain, lymphadenopathy, etc.   Options discussed included but were not limited to: laparoscopic hernia surgery, conventional hernia surgery with or without mesh, symptomatic treatment, hernia belt or truss, second opinion, referral elsewhere for treatment, observation, etc.   Possible complications of surgery discussed included but were not limited to: bleeding, infections, recurrence of the hernia(s), scar, chronic pain, nerve damage, scrotal numbness, thigh numbness, impotence, infertility, loss of testicles, need for additional operations or procedures, etc.   Possible complication of an unrepaired hernia discussed included but were not limited to: incarceration, strangulation, necrotic bowel, gangrene, death, bowel obstruction, etc.   Questions were solicited and answered.   Entire conversation was held in layman's terms.   [employer] publication on hernias was provided.   I read the entire operative consent form to him verbatim.   A copy of the consent form was provided for his review outside the clinic and hospital prior to the procedure.   All unfamiliar terms were to clearly defined.   At the conclusion of the conversation he voiced complete understanding of all we discussed, satisfaction that all questions were answered, and that he  felt fully informed and completely capable of making an informed decision.   He desires and requests to proceed with conventional hernia surgery with or without mesh as clinically indicated.

## 2020-10-09 NOTE — PROGRESS NOTES
Subjective:       Patient ID: Mina Valentine     Chief Complaint:  Consult (Referral- Malgorzata- Hernia/ Colonoscopy)      HPI:  Mr. Valentine presents today as a consult from Dr. Layton.  He was sent in consultation for a suspected right inguinal hernia.  He noted a asymmetry in the right groin compared with the left groin one morning recently while shaving.  On palpation he noted a bulge that he could reduce with pressure.  No nausea or vomiting.  No diarrhea or constipation.  No melena, hematochezia, hematemesis.  No weight loss.  There is a dull discomfort in the right groin associated with the bulge.  Pain disappears if the bulge is not present.  Pain and bulge are aggravated by strenuous activities and alleviated with rest.  He has recently increased activities with strenuous physical exercise for health purposes, he noted the asymmetry soon thereafter.  No other aggravating or alleviating factors.  No other associated symptoms.      Allergies & Meds:  Review of patient's allergies indicates:   Allergen Reactions    Penicillins      Other reaction(s): Flushing (skin)       Current Outpatient Medications   Medication Sig Dispense Refill    latanoprost 0.005 % ophthalmic solution       levothyroxine (SYNTHROID) 75 MCG tablet TAKE 1 TABLET EVERY DAY ( NEED  APPOINTMENT AND LAB ) 90 tablet 3    loratadine (CLARITIN) 5 mg chewable tablet Take 5 mg by mouth once daily.      losartan (COZAAR) 100 MG tablet Take 1 tablet (100 mg total) by mouth once daily. 90 tablet 3    omega 3-dha-epa-fish oil (FISH OIL) 120-180-500 mg Cap       pravastatin (PRAVACHOL) 20 MG tablet TAKE 1 TABLET ONE TIME DAILY 90 tablet 3    pravastatin (PRAVACHOL) 40 MG tablet Take 40 mg by mouth once daily.      triamcinolone acetonide 0.1% (KENALOG) 0.1 % ointment Apply topically 2 (two) times daily. for 14 days 30 g 1    FLUZONE HIGHDOSE QUAD 20-21  mcg/0.7 mL Syrg PHARMACIST ADMINISTERED IMMUNIZATION ADMINISTERED AT TIME OF  DISPENSING       No current facility-administered medications for this visit.        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 2009    EYE SURGERY      cataract surgery    hydrocele  1960    UMBILICAL HERNIA REPAIR      x2       Family History   Problem Relation Age of Onset    Dementia Mother     Coronary artery disease Father 58         MI    Diabetes Father         type 1    Heart disease Father     Cancer Brother 89        brain cancer    Heart disease Sister         a fib    No Known Problems Daughter     No Known Problems Son     No Known Problems Sister     No Known Problems Daughter        Social History     Tobacco Use    Smoking status: Former Smoker     Quit date: 1985     Years since quittin.7    Smokeless tobacco: Never Used   Substance Use Topics    Alcohol use: No     Frequency: Monthly or less     Drinks per session: 1 or 2     Binge frequency: Never    Drug use: No       Review of Systems   Constitutional: Negative for appetite change, chills, fatigue, fever and unexpected weight change.   HENT: Negative for congestion, dental problem, ear pain, mouth sores, postnasal drip, rhinorrhea, sore throat, tinnitus, trouble swallowing and voice change.    Eyes: Negative for photophobia, pain, discharge and visual  disturbance.   Respiratory: Negative for cough, chest tightness, shortness of breath and wheezing.    Cardiovascular: Negative for chest pain, palpitations and leg swelling.   Gastrointestinal: Negative for abdominal pain, blood in stool, constipation, diarrhea, nausea and vomiting.   Endocrine: Negative for cold intolerance, heat intolerance, polydipsia, polyphagia and polyuria.   Genitourinary: Negative for difficulty urinating, dysuria, flank pain, frequency, hematuria and urgency.   Musculoskeletal: Negative for arthralgias, joint swelling and myalgias.   Skin: Negative for color change and rash.   Allergic/Immunologic: Negative for immunocompromised state.   Neurological: Negative for dizziness, tremors, seizures, syncope, speech difficulty, weakness, numbness and headaches.   Hematological: Negative for adenopathy. Does not bruise/bleed easily.   Psychiatric/Behavioral: Negative for agitation, confusion, hallucinations, self-injury and suicidal ideas. The patient is not nervous/anxious.             Physical Exam  Constitutional:       General: He is not in acute distress.     Appearance: Normal appearance. He is well-developed. He is not ill-appearing or toxic-appearing.      Comments: Body mass index is 25.24 kg/m².     HENT:      Head: Normocephalic and atraumatic. No contusion.      Right Ear: Hearing and external ear normal. No drainage or tenderness.      Left Ear: Hearing and external ear normal. No drainage or tenderness.      Nose: Nose normal. No rhinorrhea.   Eyes:      General: Lids are normal.         Right eye: No discharge.         Left eye: No discharge.      Conjunctiva/sclera: Conjunctivae normal.      Right eye: Right conjunctiva is not injected. No exudate.     Left eye: Left conjunctiva is not injected. No exudate.     Pupils: Pupils are equal, round, and reactive to light.   Neck:      Musculoskeletal: Normal range of motion. No edema.      Thyroid: No thyroid mass or thyromegaly.       Vascular: No carotid bruit or JVD.      Trachea: Trachea and phonation normal. No tracheal deviation.   Cardiovascular:      Rate and Rhythm: Normal rate and regular rhythm.      Chest Wall: PMI is not displaced.      Heart sounds: Normal heart sounds, S1 normal and S2 normal. No murmur. No friction rub. No gallop.    Pulmonary:      Effort: Pulmonary effort is normal. No tachypnea, accessory muscle usage, respiratory distress or retractions.      Breath sounds: Normal breath sounds.   Chest:      Chest wall: No mass, tenderness or crepitus.      Breasts: Breasts are symmetrical.         Right: No inverted nipple, mass, nipple discharge or skin change.         Left: No inverted nipple, mass, nipple discharge or skin change.   Abdominal:      General: Bowel sounds are normal. There is no distension or abdominal bruit.      Palpations: Abdomen is soft. There is no fluid wave or mass.      Tenderness: There is no abdominal tenderness.      Hernia: A hernia is present. Hernia is present in the right inguinal area. There is no hernia in the umbilical area, ventral area or left inguinal area.   Genitourinary:     Penis: Normal.       Scrotum/Testes: Normal.         Right: Mass or swelling not present.         Left: Mass or swelling not present.   Musculoskeletal:      Cervical back: Normal.      Thoracic back: Normal.      Lumbar back: Normal.      Right upper arm: Normal.      Left upper arm: Normal.      Right forearm: Normal.      Left forearm: Normal.      Right hand: Normal.      Left hand: Normal.      Right upper leg: Normal.      Left upper leg: Normal.      Right lower leg: Normal.      Left lower leg: Normal.      Right foot: Normal.      Left foot: Normal.   Lymphadenopathy:      Head:      Right side of head: No submental or submandibular adenopathy.      Left side of head: No submental or submandibular adenopathy.      Cervical: No cervical adenopathy.      Upper Body:      Right upper body: No  supraclavicular adenopathy.      Left upper body: No supraclavicular adenopathy.      Lower Body: No right inguinal adenopathy. No left inguinal adenopathy.   Skin:     General: Skin is warm and dry.      Findings: No lesion or rash.      Nails: There is no clubbing.     Neurological:      Mental Status: He is alert and oriented to person, place, and time. He is not disoriented.      GCS: GCS eye subscore is 4. GCS verbal subscore is 5. GCS motor subscore is 6.      Cranial Nerves: No cranial nerve deficit.      Sensory: No sensory deficit.      Motor: No tremor or atrophy.      Coordination: Coordination normal.      Gait: Gait normal.   Psychiatric:         Attention and Perception: He is attentive.         Speech: Speech normal.         Behavior: Behavior normal.         Thought Content: Thought content normal.             Medical Records Review:  Pending    Assessment:       Symptomatic right inguinal hernia.  Options at this time include were not limited to conventional hernia repair, laparoscopic hernia repair, expectant management, nonoperative management, second opinion, etc.  Multiple comorbid issues ( hypertension, hypothyroidism, hyperlipidemia ) increase the complexity of required perioperative evaluation & management, risks of complications, and likely will increase the difficulty and complexity of postoperative care, etc.  These issues must be factored in to preoperative evaluation and perioperative management.    1. Non-recurrent unilateral inguinal hernia without obstruction or gangrene    2. Essential hypertension    3. Mixed hyperlipidemia    4. Acquired hypothyroidism          Plan:     Non-recurrent unilateral inguinal hernia without obstruction or gangrene  -     Ambulatory referral/consult to General Surgery  -     Case Request Operating Room: REPAIR, HERNIA, INGUINAL, WITHOUT HISTORY OF PRIOR REPAIR, AGE 6 YEARS OR OLDER  -     EKG 12-lead; Future  -     COVID-19 Routine Screening  -      COVID-19 Routine Screening; Future; Expected date: 10/09/2020    Essential hypertension    Mixed hyperlipidemia    Acquired hypothyroidism        Follow up in about 7 weeks (around 11/25/2020) for routine postop evaluation.    Counseling/Medical Decision Making:  Mina Valentine was informed of the results of his evaluation thus far and the differential diagnosis.   All treatment options as well as the risks, benefits, possible complications, details up, and indications for each option were also discussed in great detail.   Diagnoses discussed included but were not limited to: indirect inguinal hernia, direct inguinal hernia, femoral hernia, sliding hernia, groin strain, lymphadenopathy, etc.   Options discussed included but were not limited to: laparoscopic hernia surgery, conventional hernia surgery with or without mesh, symptomatic treatment, hernia belt or truss, second opinion, referral elsewhere for treatment, observation, etc.   Possible complications of surgery discussed included but were not limited to: bleeding, infections, recurrence of the hernia(s), scar, chronic pain, nerve damage, scrotal numbness, thigh numbness, impotence, infertility, loss of testicles, need for additional operations or procedures, etc.   Possible complication of an unrepaired hernia discussed included but were not limited to: incarceration, strangulation, necrotic bowel, gangrene, death, bowel obstruction, etc.   Questions were solicited and answered.   Entire conversation was held in layman's terms.   [employer] publication on hernias was provided.   I read the entire operative consent form to him verbatim.   A copy of the consent form was provided for his review outside the clinic and hospital prior to the procedure.   All unfamiliar terms were to clearly defined.   At the conclusion of the conversation he voiced complete understanding of all we discussed, satisfaction that all questions were answered, and that he felt fully  informed and completely capable of making an informed decision.   He desires and requests to proceed with conventional hernia surgery with or without mesh as clinically indicated.      Total face-to-face encounter time was 60 minutes, 40 minutes spent counseling as outlined/summarized above.

## 2020-10-12 ENCOUNTER — PATIENT MESSAGE (OUTPATIENT)
Dept: FAMILY MEDICINE | Facility: CLINIC | Age: 80
End: 2020-10-12

## 2020-10-14 ENCOUNTER — LAB VISIT (OUTPATIENT)
Dept: FAMILY MEDICINE | Facility: CLINIC | Age: 80
End: 2020-10-14
Payer: MEDICARE

## 2020-10-14 DIAGNOSIS — R79.9 ABNORMAL FINDING OF BLOOD CHEMISTRY, UNSPECIFIED: ICD-10-CM

## 2020-10-14 DIAGNOSIS — I10 ESSENTIAL HYPERTENSION: ICD-10-CM

## 2020-10-14 DIAGNOSIS — Z79.899 ENCOUNTER FOR LONG-TERM CURRENT USE OF MEDICATION: ICD-10-CM

## 2020-10-14 DIAGNOSIS — Z11.59 NEED FOR HEPATITIS C SCREENING TEST: ICD-10-CM

## 2020-10-14 DIAGNOSIS — E78.2 MIXED HYPERLIPIDEMIA: ICD-10-CM

## 2020-10-14 DIAGNOSIS — E03.9 ACQUIRED HYPOTHYROIDISM: ICD-10-CM

## 2020-10-14 LAB
ALBUMIN SERPL BCP-MCNC: 3.9 G/DL (ref 3.5–5.2)
ALP SERPL-CCNC: 61 U/L (ref 55–135)
ALT SERPL W/O P-5'-P-CCNC: 28 U/L (ref 10–44)
ANION GAP SERPL CALC-SCNC: 9 MMOL/L (ref 8–16)
AST SERPL-CCNC: 22 U/L (ref 10–40)
BASOPHILS # BLD AUTO: 0.04 K/UL (ref 0–0.2)
BASOPHILS NFR BLD: 0.5 % (ref 0–1.9)
BILIRUB SERPL-MCNC: 1.3 MG/DL (ref 0.1–1)
BUN SERPL-MCNC: 17 MG/DL (ref 8–23)
CALCIUM SERPL-MCNC: 9.1 MG/DL (ref 8.7–10.5)
CHLORIDE SERPL-SCNC: 105 MMOL/L (ref 95–110)
CHOLEST SERPL-MCNC: 208 MG/DL (ref 120–199)
CHOLEST/HDLC SERPL: 4.3 {RATIO} (ref 2–5)
CO2 SERPL-SCNC: 25 MMOL/L (ref 23–29)
CREAT SERPL-MCNC: 1.4 MG/DL (ref 0.5–1.4)
DIFFERENTIAL METHOD: ABNORMAL
EOSINOPHIL # BLD AUTO: 0.1 K/UL (ref 0–0.5)
EOSINOPHIL NFR BLD: 1.2 % (ref 0–8)
ERYTHROCYTE [DISTWIDTH] IN BLOOD BY AUTOMATED COUNT: 12.3 % (ref 11.5–14.5)
EST. GFR  (AFRICAN AMERICAN): 54.9 ML/MIN/1.73 M^2
EST. GFR  (NON AFRICAN AMERICAN): 47.4 ML/MIN/1.73 M^2
ESTIMATED AVG GLUCOSE: 103 MG/DL (ref 68–131)
GLUCOSE SERPL-MCNC: 97 MG/DL (ref 70–110)
HBA1C MFR BLD HPLC: 5.2 % (ref 4.5–6.2)
HCT VFR BLD AUTO: 40.9 % (ref 40–54)
HDLC SERPL-MCNC: 48 MG/DL (ref 40–75)
HDLC SERPL: 23.1 % (ref 20–50)
HGB BLD-MCNC: 13.7 G/DL (ref 14–18)
IMM GRANULOCYTES # BLD AUTO: 0.02 K/UL (ref 0–0.04)
IMM GRANULOCYTES NFR BLD AUTO: 0.3 % (ref 0–0.5)
IRON SERPL-MCNC: 93 UG/DL (ref 45–160)
LDLC SERPL CALC-MCNC: 135 MG/DL (ref 63–159)
LYMPHOCYTES # BLD AUTO: 2.3 K/UL (ref 1–4.8)
LYMPHOCYTES NFR BLD: 29.7 % (ref 18–48)
MCH RBC QN AUTO: 31.2 PG (ref 27–31)
MCHC RBC AUTO-ENTMCNC: 33.5 G/DL (ref 32–36)
MCV RBC AUTO: 93 FL (ref 82–98)
MONOCYTES # BLD AUTO: 0.7 K/UL (ref 0.3–1)
MONOCYTES NFR BLD: 8.9 % (ref 4–15)
NEUTROPHILS # BLD AUTO: 4.5 K/UL (ref 1.8–7.7)
NEUTROPHILS NFR BLD: 59.4 % (ref 38–73)
NONHDLC SERPL-MCNC: 160 MG/DL
NRBC BLD-RTO: 0 /100 WBC
PLATELET # BLD AUTO: 216 K/UL (ref 150–350)
PMV BLD AUTO: 9.9 FL (ref 9.2–12.9)
POTASSIUM SERPL-SCNC: 3.7 MMOL/L (ref 3.5–5.1)
PROT SERPL-MCNC: 7 G/DL (ref 6–8.4)
RBC # BLD AUTO: 4.39 M/UL (ref 4.6–6.2)
SATURATED IRON: 24 % (ref 20–50)
SODIUM SERPL-SCNC: 139 MMOL/L (ref 136–145)
TOTAL IRON BINDING CAPACITY: 388 UG/DL (ref 250–450)
TRANSFERRIN SERPL-MCNC: 262 MG/DL (ref 200–375)
TRIGL SERPL-MCNC: 125 MG/DL (ref 30–150)
TSH SERPL DL<=0.005 MIU/L-ACNC: 2.76 UIU/ML (ref 0.34–5.6)
WBC # BLD AUTO: 7.62 K/UL (ref 3.9–12.7)

## 2020-10-14 PROCEDURE — 80061 LIPID PANEL: CPT

## 2020-10-14 PROCEDURE — 80053 COMPREHEN METABOLIC PANEL: CPT

## 2020-10-14 PROCEDURE — 83036 HEMOGLOBIN GLYCOSYLATED A1C: CPT

## 2020-10-14 PROCEDURE — 82607 VITAMIN B-12: CPT

## 2020-10-14 PROCEDURE — 86803 HEPATITIS C AB TEST: CPT

## 2020-10-14 PROCEDURE — 85025 COMPLETE CBC W/AUTO DIFF WBC: CPT

## 2020-10-14 PROCEDURE — 84443 ASSAY THYROID STIM HORMONE: CPT

## 2020-10-14 PROCEDURE — 83540 ASSAY OF IRON: CPT

## 2020-10-14 PROCEDURE — 82728 ASSAY OF FERRITIN: CPT

## 2020-10-14 PROCEDURE — 82306 VITAMIN D 25 HYDROXY: CPT

## 2020-10-15 LAB
25(OH)D3+25(OH)D2 SERPL-MCNC: 36 NG/ML (ref 30–96)
FERRITIN SERPL-MCNC: 51 NG/ML (ref 20–300)
VIT B12 SERPL-MCNC: 231 PG/ML (ref 210–950)

## 2020-10-16 LAB — HCV AB SERPL QL IA: NEGATIVE

## 2020-10-27 ENCOUNTER — HOSPITAL ENCOUNTER (OUTPATIENT)
Dept: RADIOLOGY | Facility: HOSPITAL | Age: 80
Discharge: HOME | End: 2020-10-27
Attending: FAMILY MEDICINE
Payer: MEDICARE

## 2020-10-27 ENCOUNTER — OFFICE VISIT (OUTPATIENT)
Dept: FAMILY MEDICINE | Facility: CLINIC | Age: 80
End: 2020-10-27
Payer: MEDICARE

## 2020-10-27 VITALS
WEIGHT: 164.88 LBS | HEIGHT: 68 IN | HEART RATE: 65 BPM | BODY MASS INDEX: 24.99 KG/M2 | RESPIRATION RATE: 15 BRPM | OXYGEN SATURATION: 97 % | SYSTOLIC BLOOD PRESSURE: 179 MMHG | DIASTOLIC BLOOD PRESSURE: 71 MMHG

## 2020-10-27 DIAGNOSIS — D51.8 OTHER VITAMIN B12 DEFICIENCY ANEMIAS: ICD-10-CM

## 2020-10-27 DIAGNOSIS — Z01.818 PREOPERATIVE EXAMINATION: ICD-10-CM

## 2020-10-27 DIAGNOSIS — Z23 NEED FOR SHINGLES VACCINE: ICD-10-CM

## 2020-10-27 DIAGNOSIS — Z79.899 ENCOUNTER FOR LONG-TERM CURRENT USE OF MEDICATION: ICD-10-CM

## 2020-10-27 DIAGNOSIS — I10 ESSENTIAL HYPERTENSION: ICD-10-CM

## 2020-10-27 DIAGNOSIS — J30.1 NON-SEASONAL ALLERGIC RHINITIS DUE TO POLLEN: ICD-10-CM

## 2020-10-27 DIAGNOSIS — Z01.818 PREOPERATIVE EXAMINATION: Primary | ICD-10-CM

## 2020-10-27 LAB
BILIRUB UR QL STRIP: NEGATIVE
CLARITY UR: CLEAR
COLOR UR: YELLOW
GLUCOSE UR QL STRIP: NEGATIVE
HGB UR QL STRIP: NEGATIVE
INR PPP: 1 (ref 0.8–1.2)
KETONES UR QL STRIP: NEGATIVE
LEUKOCYTE ESTERASE UR QL STRIP: NEGATIVE
MICROSCOPIC COMMENT: NORMAL
NITRITE UR QL STRIP: NEGATIVE
PH UR STRIP: 7 [PH] (ref 5–8)
PROT UR QL STRIP: NEGATIVE
PROTHROMBIN TIME: 10.1 SEC (ref 9–12.5)
SP GR UR STRIP: 1.01 (ref 1–1.03)
URN SPEC COLLECT METH UR: NORMAL
UROBILINOGEN UR STRIP-ACNC: NEGATIVE EU/DL

## 2020-10-27 PROCEDURE — 99215 PR OFFICE/OUTPT VISIT, EST, LEVL V, 40-54 MIN: ICD-10-PCS | Mod: S$GLB,,, | Performed by: FAMILY MEDICINE

## 2020-10-27 PROCEDURE — 3078F PR MOST RECENT DIASTOLIC BLOOD PRESSURE < 80 MM HG: ICD-10-PCS | Mod: CPTII,S$GLB,, | Performed by: FAMILY MEDICINE

## 2020-10-27 PROCEDURE — 1126F PR PAIN SEVERITY QUANTIFIED, NO PAIN PRESENT: ICD-10-PCS | Mod: S$GLB,,, | Performed by: FAMILY MEDICINE

## 2020-10-27 PROCEDURE — 3077F PR MOST RECENT SYSTOLIC BLOOD PRESSURE >= 140 MM HG: ICD-10-PCS | Mod: CPTII,S$GLB,, | Performed by: FAMILY MEDICINE

## 2020-10-27 PROCEDURE — 1159F MED LIST DOCD IN RCRD: CPT | Mod: S$GLB,,, | Performed by: FAMILY MEDICINE

## 2020-10-27 PROCEDURE — 71046 X-RAY EXAM CHEST 2 VIEWS: CPT | Mod: TC

## 2020-10-27 PROCEDURE — 1101F PR PT FALLS ASSESS DOC 0-1 FALLS W/OUT INJ PAST YR: ICD-10-PCS | Mod: CPTII,S$GLB,, | Performed by: FAMILY MEDICINE

## 2020-10-27 PROCEDURE — 85610 PROTHROMBIN TIME: CPT

## 2020-10-27 PROCEDURE — 99215 OFFICE O/P EST HI 40 MIN: CPT | Mod: S$GLB,,, | Performed by: FAMILY MEDICINE

## 2020-10-27 PROCEDURE — 3288F PR FALLS RISK ASSESSMENT DOCUMENTED: ICD-10-PCS | Mod: CPTII,S$GLB,, | Performed by: FAMILY MEDICINE

## 2020-10-27 PROCEDURE — 71046 XR CHEST PA AND LATERAL: ICD-10-PCS | Mod: 26,,, | Performed by: RADIOLOGY

## 2020-10-27 PROCEDURE — 1157F PR ADVANCE CARE PLAN OR EQUIV PRESENT IN MEDICAL RECORD: ICD-10-PCS | Mod: S$GLB,,, | Performed by: FAMILY MEDICINE

## 2020-10-27 PROCEDURE — 1126F AMNT PAIN NOTED NONE PRSNT: CPT | Mod: S$GLB,,, | Performed by: FAMILY MEDICINE

## 2020-10-27 PROCEDURE — 81000 URINALYSIS NONAUTO W/SCOPE: CPT

## 2020-10-27 PROCEDURE — 1157F ADVNC CARE PLAN IN RCRD: CPT | Mod: S$GLB,,, | Performed by: FAMILY MEDICINE

## 2020-10-27 PROCEDURE — 3288F FALL RISK ASSESSMENT DOCD: CPT | Mod: CPTII,S$GLB,, | Performed by: FAMILY MEDICINE

## 2020-10-27 PROCEDURE — 1101F PT FALLS ASSESS-DOCD LE1/YR: CPT | Mod: CPTII,S$GLB,, | Performed by: FAMILY MEDICINE

## 2020-10-27 PROCEDURE — 99999 PR PBB SHADOW E&M-EST. PATIENT-LVL V: CPT | Mod: PBBFAC,,, | Performed by: FAMILY MEDICINE

## 2020-10-27 PROCEDURE — 99999 PR PBB SHADOW E&M-EST. PATIENT-LVL V: ICD-10-PCS | Mod: PBBFAC,,, | Performed by: FAMILY MEDICINE

## 2020-10-27 PROCEDURE — 3077F SYST BP >= 140 MM HG: CPT | Mod: CPTII,S$GLB,, | Performed by: FAMILY MEDICINE

## 2020-10-27 PROCEDURE — 71046 X-RAY EXAM CHEST 2 VIEWS: CPT | Mod: 26,,, | Performed by: RADIOLOGY

## 2020-10-27 PROCEDURE — 1159F PR MEDICATION LIST DOCUMENTED IN MEDICAL RECORD: ICD-10-PCS | Mod: S$GLB,,, | Performed by: FAMILY MEDICINE

## 2020-10-27 PROCEDURE — 3078F DIAST BP <80 MM HG: CPT | Mod: CPTII,S$GLB,, | Performed by: FAMILY MEDICINE

## 2020-10-27 RX ORDER — CYANOCOBALAMIN 1000 UG/ML
1000 INJECTION, SOLUTION INTRAMUSCULAR; SUBCUTANEOUS ONCE
Status: DISCONTINUED | OUTPATIENT
Start: 2020-10-27 | End: 2022-06-13

## 2020-10-27 RX ORDER — MINERAL OIL
180 ENEMA (ML) RECTAL DAILY
Qty: 90 TABLET | Refills: 3 | Status: SHIPPED | OUTPATIENT
Start: 2020-10-27 | End: 2021-10-27

## 2020-10-27 NOTE — PATIENT INSTRUCTIONS
Vitamin D3 4,000 IU daily with a MEAL or with a handful of almonds  Methyl-B12 sublingual (under the tongue) 2,000mcg daily  CoQ10 300mg daily      Bring your blood pressure cuff in to have it checked--we want to make sure that your blood pressure readings at home are accurate!      Humidifier at bedside while sleeping--maybe this along with your Ayr nasal saline gel will help combat the dry nostrils.

## 2020-10-27 NOTE — PROGRESS NOTES
CBC:   Lab Results   Component Value Date    WBC 7.62 10/14/2020    HGB 13.7 (L) 10/14/2020    HCT 40.9 10/14/2020     10/14/2020    MCV 93 10/14/2020    RDW 12.3 10/14/2020     CMP:   Lab Results   Component Value Date     10/14/2020    K 3.7 10/14/2020     10/14/2020    CO2 25 10/14/2020    BUN 17 10/14/2020    CREATININE 1.4 10/14/2020    GLU 97 10/14/2020    CALCIUM 9.1 10/14/2020    ALKPHOS 61 10/14/2020    PROT 7.0 10/14/2020    ALBUMIN 3.9 10/14/2020    BILITOT 1.3 (H) 10/14/2020    AST 22 10/14/2020    ALT 28 10/14/2020     LIPIDS:   Lab Results   Component Value Date    CHOL 208 (H) 10/14/2020    HDL 48 10/14/2020    LDLCALC 135.0 10/14/2020    TRIG 125 10/14/2020    CHOLHDL 23.1 10/14/2020     HA1C:   Lab Results   Component Value Date    HGBA1C 5.2 10/14/2020     TSH:   Lab Results   Component Value Date    TSH 2.762 10/14/2020       Subjective:       Patient ID: Mina Valentine is a 80 y.o. male.    Chief Complaint: Allergies and Follow-up (labs recently)    In regards to the patient's hypertension, patient denies chest pain/sob, and reports compliance with medication regimen.  Running 130-140 over 65-68 at home. Denies CP, SOB, HA, dizziness.     Severe allergic rhinitis, nonseasonal.  Claritin not working well.  Dry nose at night.  Dehumidifier in bedroom closet, dries out nose/mouth.  Not using a bedside humidifier.  Ayr saline gel occasionally at night.    Labs reviewed with patient.    He is scheduled to have hernia repair later this month. Has done well with anesthesia in the past. Denies CP with exertion, able to walk up 1-2 flights of stairs without SOB or CP. No HA, no weakness, no cough or wheezing. No F/C/S. Has had some preop labs done as ordered by surgeon.      Depression Patient Health Questionnaire 5/18/2018 10/18/2016 3/31/2015   Over the last two weeks how often have you been bothered by little interest or pleasure in doing things 0 0 0   Over the last two weeks  how often have you been bothered by feeling down, depressed or hopeless 0 0 0   PHQ-2 Total Score 0 0 0   Over the last two weeks how often have you been bothered by trouble falling or staying asleep, or sleeping too much - 0 0   Over the last two weeks how often have you been bothered by feeling tired or having little energy - 0 0   Over the last two weeks how often have you been bothered by a poor appetite or overeating - 0 0   Over the last two weeks how often have you been bothered by feeling bad about yourself - or that you are a failure or have let yourself or your family down - 0 0   Over the last two weeks how often have you been bothered by trouble concentrating on things, such as reading the newspaper or watching television - 0 0   Over the last two weeks how often have you been bothered by moving or speaking so slowly that other people could have noticed. Or the opposite - being so fidgety or restless that you have been moving around a lot more than usual. - 0 0   Over the last two weeks how often have you been bothered by thoughts that you would be better off dead, or of hurting yourself - 0 0   If you checked off any problems, how difficult have these problems made it for you to do your work, take care of things at home or get along with other people? - Not difficult at all Not difficult at all   Total Score - 0 0       Review of Systems   All other systems reviewed and are negative.        Past Medical History:   Diagnosis Date    Basal cell carcinoma     Basal cell carcinoma of face 2014, mohs    left medial cheek, right upper cheek    BCC (basal cell carcinoma of skin) 1/23/2014    BCC (basal cell carcinoma), face     Deviated septum     Glaucoma     History of prostate cancer 2/10/2016    HTN (hypertension)     Hyperlipidemia     Ocular hypertension 10/18/2016    Prostate cancer     seed implants 2006    Thyroid disease     hypothyroidism    Umbilical hernia      Past Surgical History:    Procedure Laterality Date    [place] for [place]      [place], [place]    CARDIOVASCULAR STRESS TEST      WNL 1 1/2 yrs ago    COLONOSCOPY      no polyps approx. 2009    EYE SURGERY      cataract surgery    HERNIA REPAIR  2004    belly button    hydrocele  1960    PROSTATE SURGERY  2007    seed implant    UMBILICAL HERNIA REPAIR      x2     Family History   Problem Relation Age of Onset    Dementia Mother     Coronary artery disease Father 58         MI    Diabetes Father         type 1    Heart disease Father     Cancer Brother 89        brain cancer    Heart disease Sister         a fib    No Known Problems Daughter     No Known Problems Son     No Known Problems Sister     No Known Problems Daughter        Review of patient's allergies indicates:   Allergen Reactions    Penicillins Rash     Other reaction(s): Flushing (skin)       Current Outpatient Medications:     latanoprost 0.005 % ophthalmic solution, , Disp: , Rfl:     levothyroxine (SYNTHROID) 75 MCG tablet, TAKE 1 TABLET EVERY DAY ( NEED  APPOINTMENT AND LAB ), Disp: 90 tablet, Rfl: 3    omega 3-dha-epa-fish oil (FISH OIL) 120-180-500 mg Cap, , Disp: , Rfl:     fexofenadine (ALLEGRA) 180 MG tablet, Take 1 tablet (180 mg total) by mouth once daily. For allergies, Disp: 90 tablet, Rfl: 3    losartan (COZAAR) 100 MG tablet, Take 1 tablet (100 mg total) by mouth once daily., Disp: 90 tablet, Rfl: 3    ondansetron (ZOFRAN) 4 MG tablet, Take 1 tablet (4 mg total) by mouth every 6 (six) hours as needed., Disp: 30 tablet, Rfl: 0    pravastatin (PRAVACHOL) 40 MG tablet, Take 1 tablet (40 mg total) by mouth once daily., Disp: 90 tablet, Rfl: 3    triamcinolone acetonide 0.1% (KENALOG) 0.1 % ointment, Apply topically 2 (two) times daily. for 14 days, Disp: 30 g, Rfl: 1    Current Facility-Administered Medications:     cyanocobalamin injection 1,000 mcg, 1,000 mcg, Intramuscular, Once, Blanca H. Holden  "MD      Objective:      BP (!) 179/71 (BP Location: Left arm, Patient Position: Sitting, BP Method: Medium (Automatic))   Pulse 65   Resp 15   Ht 5' 8" (1.727 m)   Wt 74.8 kg (164 lb 14.5 oz)   SpO2 97%   BMI 25.07 kg/m²   Physical Exam  Vitals signs and nursing note reviewed.   Constitutional:       General: He is not in acute distress.     Appearance: Normal appearance. He is well-developed and normal weight. He is not ill-appearing, toxic-appearing or diaphoretic.   HENT:      Head: Normocephalic and atraumatic.      Right Ear: External ear normal.      Left Ear: External ear normal.      Nose: Congestion present. No rhinorrhea.      Mouth/Throat:      Mouth: Mucous membranes are moist.      Pharynx: Oropharynx is clear. No oropharyngeal exudate or posterior oropharyngeal erythema.   Eyes:      General: No scleral icterus.        Right eye: No discharge.         Left eye: No discharge.      Extraocular Movements: Extraocular movements intact.      Conjunctiva/sclera: Conjunctivae normal.      Pupils: Pupils are equal, round, and reactive to light.   Neck:      Musculoskeletal: Normal range of motion and neck supple. No neck rigidity or muscular tenderness.      Thyroid: No thyromegaly.      Vascular: No carotid bruit or JVD.      Trachea: No tracheal deviation.   Cardiovascular:      Rate and Rhythm: Normal rate and regular rhythm.      Pulses: Normal pulses.      Heart sounds: Normal heart sounds. No murmur. No friction rub.   Pulmonary:      Effort: Pulmonary effort is normal. No respiratory distress.      Breath sounds: Normal breath sounds. No wheezing, rhonchi or rales.   Chest:      Chest wall: No tenderness.   Abdominal:      General: Abdomen is flat. Bowel sounds are normal. There is no distension.      Palpations: Abdomen is soft. There is no mass.      Tenderness: There is abdominal tenderness. There is no right CVA tenderness, left CVA tenderness, guarding or rebound.      Hernia: A hernia is " present.   Musculoskeletal: Normal range of motion.         General: No deformity.      Right lower leg: No edema.      Left lower leg: No edema.   Lymphadenopathy:      Cervical: No cervical adenopathy.   Skin:     General: Skin is warm and dry.      Capillary Refill: Capillary refill takes less than 2 seconds.      Coloration: Skin is not pale.      Findings: No erythema or rash.   Neurological:      General: No focal deficit present.      Mental Status: He is alert and oriented to person, place, and time. Mental status is at baseline.      Motor: No weakness.      Coordination: Coordination normal.      Gait: Gait normal.   Psychiatric:         Mood and Affect: Mood normal.         Behavior: Behavior normal.         Thought Content: Thought content normal.         Judgment: Judgment normal.         Assessment:       1. Preoperative examination    2. Essential hypertension    3. Encounter for long-term current use of medication    4. Non-seasonal allergic rhinitis due to pollen    5. Other vitamin B12 deficiency anemias     6. Need for shingles vaccine        Plan:       Preoperative examination  -     Protime-INR; Future; Expected date: 10/27/2020  -     X-Ray Chest PA And Lateral; Future; Expected date: 10/27/2020  -     IN OFFICE EKG 12-LEAD (to Muse)  -     Urinalysis, Reflex to Urine Culture Urine, Clean Catch; Future; Expected date: 10/27/2020    Essential hypertension  -     Urinalysis Microscopic    Encounter for long-term current use of medication  -     Urinalysis Microscopic    Non-seasonal allergic rhinitis due to pollen  -     fexofenadine (ALLEGRA) 180 MG tablet; Take 1 tablet (180 mg total) by mouth once daily. For allergies  Dispense: 90 tablet; Refill: 3    Other vitamin B12 deficiency anemias   -     cyanocobalamin injection 1,000 mcg  -     Protime-INR; Future; Expected date: 10/27/2020    Need for shingles vaccine  -     varicella-zoster [id number], PF, (SHINGRIX) 50 mcg/0.5 mL injection; Inject  0.5 mLs into the muscle once. for 1 dose  Dispense: 1 each; Refill: 0    Strict return precautions reviewed and patient verbalized understanding. Risks, benefits, and alternatives to the plan were reviewed in detail and all questions answered to the patient's satisfaction. 40minutes were spent with patient, >50% of time based on counseling and coordination of care.    Will review XR and EKG, await INR and UA. Patient should do well with surgery.        Follow up in about 6 weeks (around 12/8/2020).

## 2020-11-06 ENCOUNTER — HOSPITAL ENCOUNTER (OUTPATIENT)
Dept: PREADMISSION TESTING | Facility: HOSPITAL | Age: 80
Discharge: HOME | End: 2020-11-06
Attending: SURGERY
Payer: MEDICARE

## 2020-11-06 ENCOUNTER — HOSPITAL ENCOUNTER (OUTPATIENT)
Dept: CARDIOLOGY | Facility: HOSPITAL | Age: 80
Discharge: HOME | End: 2020-11-06
Attending: SURGERY
Payer: MEDICARE

## 2020-11-06 VITALS — BODY MASS INDEX: 24.86 KG/M2 | WEIGHT: 164 LBS | HEIGHT: 68 IN

## 2020-11-06 DIAGNOSIS — K40.90 NON-RECURRENT UNILATERAL INGUINAL HERNIA WITHOUT OBSTRUCTION OR GANGRENE: ICD-10-CM

## 2020-11-06 PROCEDURE — 99900103 DSU ONLY-NO CHARGE-INITIAL HR (STAT)

## 2020-11-06 PROCEDURE — 93005 ELECTROCARDIOGRAM TRACING: CPT

## 2020-11-06 PROCEDURE — 93010 ELECTROCARDIOGRAM REPORT: CPT | Mod: ,,, | Performed by: INTERNAL MEDICINE

## 2020-11-06 PROCEDURE — 93010 EKG 12-LEAD: ICD-10-PCS | Mod: ,,, | Performed by: INTERNAL MEDICINE

## 2020-11-06 RX ORDER — MULTIVIT WITH MINERALS/HERBS
1 TABLET ORAL DAILY
COMMUNITY
End: 2020-11-25

## 2020-11-06 NOTE — PRE-PROCEDURE INSTRUCTIONS
0908-ARRIVED FOR PAT. ALERT AND ORIENTED. AMBULATORY. SURGERY 11/10/20.  RT ING HERNIA REPAIR. DR YAGN. DISCUSSED PROCEDURE. PRE, POST OP AND DISCHARGE, ONE FAMILY MEMBER TO WAIT. PT STATED HIS WIFE WILL DROP HIM OFF GO HOME AND WAIT TO PICK HIM UP AFTER PROCEDURE. HIBICLENS WASH GIVEN AND INSTRUCTED TO WASH THE NIGHT BEFORE AND MORNING OFF PROCEDURE. MEDICATIONS DISCUSSED. PT INSTRUCTED TO TAKE HIS BP MED AND THYROID MED MORNING OF WITH A SIP OF WATER ONLY. PT STATED HE HAS STOPPED HIS FISH OIL PRIOR TO HIS PROCEDURE FOR 10 DAYS. INSTRUCTED PT TO TAKE ALL MEDICATIONS THE DAY BEFORE AS HE NORMALLY DOES. INSTRUCTED ON NPO AFTER MIDNIGHT. INFORMED PT WE WILL CALL ON Monday NOV 09, 2020 AND LET HIM KNOW HIS ARRIVAL TIME FOR SURGERY. ANESTHESIA IN OR AT THIS TIME AND IS UNAVAILABLE TO SPEAIKE TO PT. PT'S STATED HIS QUESTIONS HAVE BEEN ANSWERED. PAT COMPLETED.  0940- TO EKG. LABS WHERE DONE IN October

## 2020-11-07 ENCOUNTER — LAB VISIT (OUTPATIENT)
Dept: FAMILY MEDICINE | Facility: CLINIC | Age: 80
End: 2020-11-07
Payer: MEDICARE

## 2020-11-07 DIAGNOSIS — K40.90 NON-RECURRENT UNILATERAL INGUINAL HERNIA WITHOUT OBSTRUCTION OR GANGRENE: ICD-10-CM

## 2020-11-07 PROCEDURE — U0003 INFECTIOUS AGENT DETECTION BY NUCLEIC ACID (DNA OR RNA); SEVERE ACUTE RESPIRATORY SYNDROME CORONAVIRUS 2 (SARS-COV-2) (CORONAVIRUS DISEASE [COVID-19]), AMPLIFIED PROBE TECHNIQUE, MAKING USE OF HIGH THROUGHPUT TECHNOLOGIES AS DESCRIBED BY CMS-2020-01-R: HCPCS

## 2020-11-08 LAB — SARS-COV-2 RNA RESP QL NAA+PROBE: NOT DETECTED

## 2020-11-09 NOTE — H&P
[facility] - General Surgery  General Surgery  H&P  11/10/2020    Patient Name: Mina Valentine  MRN: [id number]     Chief Complaint:  I am having my hernia fixed      HPI:  Mr. Valentine presents today to proceed with a right inguinal hernia repair.  He was seen in the Surgery Clinic on 10/9/2020 as a consult from Dr. Layton.  He was sent in consultation for a suspected right inguinal hernia.  He noted a asymmetry in the right groin compared with the left groin one morning recently while shaving.  On palpation he noted a bulge that he could reduce with pressure.  No nausea or vomiting.  No diarrhea or constipation.  No melena, hematochezia, hematemesis.  No weight loss.  There is a dull discomfort in the right groin associated with the bulge.  Pain disappears if the bulge is not present.  Pain and bulge are aggravated by strenuous activities and alleviated with rest.  He has recently increased activities with strenuous physical exercise for health purposes, he noted the asymmetry soon thereafter.  No other aggravating or alleviating factors.  No other associated symptoms.      Allergies & Meds:     Allergen Reactions    Penicillins      Other reaction(s): Flushing (skin)       Current Outpatient Medications   Medication Sig Dispense Refill    latanoprost 0.005 % ophthalmic solution       levothyroxine (SYNTHROID) 75 MCG tablet TAKE 1 TABLET EVERY DAY ( NEED  APPOINTMENT AND LAB ) 90 tablet 3    loratadine (CLARITIN) 5 mg chewable tablet Take 5 mg by mouth once daily.      losartan (COZAAR) 100 MG tablet Take 1 tablet (100 mg total) by mouth once daily. 90 tablet 3    omega 3-dha-epa-fish oil (FISH OIL) 120-180-500 mg Cap       pravastatin (PRAVACHOL) 20 MG tablet TAKE 1 TABLET ONE TIME DAILY 90 tablet 3    pravastatin (PRAVACHOL) 40 MG tablet Take 40 mg by mouth once daily.      triamcinolone acetonide 0.1% (KENALOG) 0.1 % ointment Apply topically 2 (two) times daily. 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 2009    EYE SURGERY      cataract surgery    hydrocele  1960    UMBILICAL HERNIA REPAIR      x2       Family History   Problem Relation Age of Onset    Dementia Mother     Coronary artery disease Father 58         MI    Diabetes Father         type 1    Heart disease Father     Cancer Brother 89        brain cancer    Heart disease Sister         a fib    No Known Problems Daughter     No Known Problems Son     No Known Problems Sister     No Known Problems Daughter        Social History     Tobacco Use    Smoking status: Former Smoker     Quit date: 1985     Years since quittin.7    Smokeless tobacco: Never Used   Substance Use Topics    Alcohol use: No     Frequency: Monthly or less     Drinks per session: 1 or 2     Binge frequency: Never    Drug use: No       Review of Systems   Constitutional: Negative for appetite change, chills, fatigue, fever and unexpected weight change.   HENT: Negative for congestion, dental problem, ear pain, mouth sores, postnasal drip, rhinorrhea, sore throat, tinnitus, trouble swallowing and voice  change.    Eyes: Negative for photophobia, pain, discharge and visual disturbance.   Respiratory: Negative for cough, chest tightness, shortness of breath and wheezing.    Cardiovascular: Negative for chest pain, palpitations and leg swelling.   Gastrointestinal: Negative for abdominal pain, blood in stool, constipation, diarrhea, nausea and vomiting.   Endocrine: Negative for cold intolerance, heat intolerance, polydipsia, polyphagia and polyuria.   Genitourinary: Negative for difficulty urinating, dysuria, flank pain, frequency, hematuria and urgency.   Musculoskeletal: Negative for arthralgias, joint swelling and myalgias.   Skin: Negative for color change and rash.   Allergic/Immunologic: Negative for immunocompromised state.   Neurological: Negative for dizziness, tremors, seizures, syncope, speech difficulty, weakness, numbness and headaches.   Hematological: Negative for adenopathy. Does not bruise/bleed easily.   Psychiatric/Behavioral: Negative for agitation, confusion, hallucinations, self-injury and suicidal ideas. The patient is not nervous/anxious.             Physical Exam  Constitutional:       General: He is not in acute distress.     Appearance: Normal appearance. He is well-developed. He is not ill-appearing or toxic-appearing.      Comments: Body mass index is 25.24 kg/m².   HENT:      Head: Normocephalic and atraumatic. No contusion.      Right Ear: Hearing and external ear normal. No drainage or tenderness.      Left Ear: Hearing and external ear normal. No drainage or tenderness.      Nose: Nose normal. No rhinorrhea.   Eyes:      General: Lids are normal.         Right eye: No discharge.         Left eye: No discharge.      Conjunctiva/sclera: Conjunctivae normal.      Right eye: Right conjunctiva is not injected. No exudate.     Left eye: Left conjunctiva is not injected. No exudate.     Pupils: Pupils are equal, round, and reactive to light.   Neck:      Musculoskeletal: Normal range of  motion. No edema.      Thyroid: No thyroid mass or thyromegaly.      Vascular: No carotid bruit or JVD.      Trachea: Trachea and phonation normal. No tracheal deviation.   Cardiovascular:      Rate and Rhythm: Normal rate and regular rhythm.      Chest Wall: PMI is not displaced.      Heart sounds: Normal heart sounds, S1 normal and S2 normal. No murmur. No friction rub. No gallop.    Pulmonary:      Effort: Pulmonary effort is normal. No tachypnea, accessory muscle usage, respiratory distress or retractions.      Breath sounds: Normal breath sounds.   Chest:      Chest wall: No mass, tenderness or crepitus.      Breasts: Breasts are symmetrical.         Right: No inverted nipple, mass, nipple discharge or skin change.         Left: No inverted nipple, mass, nipple discharge or skin change.   Abdominal:      General: Bowel sounds are normal. There is no distension or abdominal bruit.      Palpations: Abdomen is soft. There is no fluid wave or mass.      Tenderness: There is no abdominal tenderness.      Hernia: A hernia is present. Hernia is present in the right inguinal area. There is no hernia in the umbilical area, ventral area or left inguinal area.   Genitourinary:     Penis: Normal.       Scrotum/Testes: Normal.         Right: Mass or swelling not present.         Left: Mass or swelling not present.   Musculoskeletal:      Cervical back: Normal.      Thoracic back: Normal.      Lumbar back: Normal.      Right upper arm: Normal.      Left upper arm: Normal.      Right forearm: Normal.      Left forearm: Normal.      Right hand: Normal.      Left hand: Normal.      Right upper leg: Normal.      Left upper leg: Normal.      Right lower leg: Normal.      Left lower leg: Normal.      Right foot: Normal.      Left foot: Normal.   Lymphadenopathy:      Head:      Right side of head: No submental or submandibular adenopathy.      Left side of head: No submental or submandibular adenopathy.      Cervical: No cervical  adenopathy.      Upper Body:      Right upper body: No supraclavicular adenopathy.      Left upper body: No supraclavicular adenopathy.      Lower Body: No right inguinal adenopathy. No left inguinal adenopathy.   Skin:     General: Skin is warm and dry.      Findings: No lesion or rash.      Nails: There is no clubbing.     Neurological:      Mental Status: He is alert and oriented to person, place, and time. He is not disoriented.      GCS: GCS eye subscore is 4. GCS verbal subscore is 5. GCS motor subscore is 6.      Cranial Nerves: No cranial nerve deficit.      Sensory: No sensory deficit.      Motor: No tremor or atrophy.      Coordination: Coordination normal.      Gait: Gait normal.   Psychiatric:         Attention and Perception: He is attentive.         Speech: Speech normal.         Behavior: Behavior normal.         Thought Content: Thought content normal.             Medical Records Review:  Routine COVID-19 screening negative on 11/7/2020.    EKG reviewed from 11/6/2020.  Twelve lead tracing showed normal sinus rhythm with no evidence of any ischemic changes.    Lab results reviewed from 10/14/2020.  CBC showed no evidence of leukocytosis or thrombocytopenia, very mild normochromic normocytic anemia was present with a hemoglobin of 13.7 grams/deciliter.  Electrolytes were all in the range of normal.  BUN and creatinine showed no evidence of renal dysfunction.  Estimated GFR was slightly deficient at 47.4 mL/min consistent with stage G3a CKD.  Glucose was in the range of normal.  Liver profile showed a mild hyperbilirubinemia with a total bilirubin of 1.3 mg/dL but no other evidence of hepatocellular disease or biliary obstruction.  Total bilirubin has been stable for 2 years in comparison with prior results.  Hemoglobin A1c showed no evidence of diabetes.  Iron, TIBC, and ferritin level showed no evidence of iron deficiency.  TSH indicates he is euthyroid.  PT/INR showed no suggestion of coagulopathy,  factor deficit, or anticoagulation therapy.  Vitamin-D and B12 levels were in the range of normal.    Assessment:       Symptomatic right inguinal hernia.  Options at this time include were not limited to conventional hernia repair, laparoscopic hernia repair, expectant management, nonoperative management, second opinion, etc.  Multiple comorbid issues ( hypertension, hypothyroidism, hyperlipidemia ) increase the complexity of required perioperative evaluation & management, risks of complications, and likely will increase the difficulty and complexity of postoperative care, etc.  These issues must be factored in to preoperative evaluation and perioperative management.    1. Non-recurrent unilateral inguinal hernia without obstruction or gangrene    2. Essential hypertension    3. Mixed hyperlipidemia    4. Acquired hypothyroidism          Plan:     Non-recurrent unilateral inguinal hernia without obstruction or gangrene  -     Case Request Operating Room: REPAIR, HERNIA, INGUINAL, WITHOUT HISTORY OF PRIOR REPAIR, AGE 6 YEARS OR OLDER    Essential hypertension    Mixed hyperlipidemia    Acquired hypothyroidism    Follow up around 11/25/2020 for routine postop evaluation.    Counseling/Medical Decision Making:  Mina WILSON Meme was informed of the results of his evaluation thus far and the differential diagnosis.   All treatment options as well as the risks, benefits, possible complications, details up, and indications for each option were also discussed in great detail.   Diagnoses discussed included but were not limited to: indirect inguinal hernia, direct inguinal hernia, femoral hernia, sliding hernia, groin strain, lymphadenopathy, etc.   Options discussed included but were not limited to: laparoscopic hernia surgery, conventional hernia surgery with or without mesh, symptomatic treatment, hernia belt or truss, second opinion, referral elsewhere for treatment, observation, etc.   Possible complications of surgery  discussed included but were not limited to: bleeding, infections, recurrence of the hernia(s), scar, chronic pain, nerve damage, scrotal numbness, thigh numbness, impotence, infertility, loss of testicles, need for additional operations or procedures, etc.   Possible complication of an unrepaired hernia discussed included but were not limited to: incarceration, strangulation, necrotic bowel, gangrene, death, bowel obstruction, etc.   Questions were solicited and answered.   Entire conversation was held in layman's terms.   [employer] publication on hernias was provided.   I read the entire operative consent form to him verbatim.   A copy of the consent form was provided for his review outside the clinic and hospital prior to the procedure.   All unfamiliar terms were to clearly defined.   At the conclusion of the conversation he voiced complete understanding of all we discussed, satisfaction that all questions were answered, and that he felt fully informed and completely capable of making an informed decision.   He desires and requests to proceed with conventional hernia surgery with or without mesh as clinically indicated.      No evident contraindication to proceeding with planned operation today.

## 2020-11-10 ENCOUNTER — ANESTHESIA (OUTPATIENT)
Dept: SURGERY | Facility: HOSPITAL | Age: 80
End: 2020-11-10
Payer: MEDICARE

## 2020-11-10 ENCOUNTER — ANESTHESIA EVENT (OUTPATIENT)
Dept: SURGERY | Facility: HOSPITAL | Age: 80
End: 2020-11-10
Payer: MEDICARE

## 2020-11-10 ENCOUNTER — HOSPITAL ENCOUNTER (OUTPATIENT)
Facility: HOSPITAL | Age: 80
Discharge: HOME | End: 2020-11-10
Attending: SURGERY | Admitting: SURGERY
Payer: MEDICARE

## 2020-11-10 VITALS
BODY MASS INDEX: 24.86 KG/M2 | WEIGHT: 164 LBS | TEMPERATURE: 98 F | DIASTOLIC BLOOD PRESSURE: 70 MMHG | HEIGHT: 68 IN | SYSTOLIC BLOOD PRESSURE: 161 MMHG | OXYGEN SATURATION: 98 % | RESPIRATION RATE: 15 BRPM | HEART RATE: 68 BPM

## 2020-11-10 DIAGNOSIS — K40.90 INGUINAL HERNIA OF RIGHT SIDE WITHOUT OBSTRUCTION OR GANGRENE: Primary | ICD-10-CM

## 2020-11-10 DIAGNOSIS — K40.90 INGUINAL HERNIA OF LEFT SIDE WITHOUT OBSTRUCTION OR GANGRENE: ICD-10-CM

## 2020-11-10 PROCEDURE — 71000033 HC RECOVERY, INTIAL HOUR: Performed by: SURGERY

## 2020-11-10 PROCEDURE — 37000009 HC ANESTHESIA EA ADD 15 MINS: Performed by: SURGERY

## 2020-11-10 PROCEDURE — D9220A PRA ANESTHESIA: Mod: ANES,,, | Performed by: ANESTHESIOLOGY

## 2020-11-10 PROCEDURE — D9220A PRA ANESTHESIA: Mod: CRNA,,, | Performed by: NURSE ANESTHETIST, CERTIFIED REGISTERED

## 2020-11-10 PROCEDURE — C1781 MESH (IMPLANTABLE): HCPCS | Performed by: SURGERY

## 2020-11-10 PROCEDURE — 25000003 PHARM REV CODE 250: Performed by: SURGERY

## 2020-11-10 PROCEDURE — 37000008 HC ANESTHESIA 1ST 15 MINUTES: Performed by: SURGERY

## 2020-11-10 PROCEDURE — D9220A PRA ANESTHESIA: ICD-10-PCS | Mod: ANES,,, | Performed by: ANESTHESIOLOGY

## 2020-11-10 PROCEDURE — 36000707: Performed by: SURGERY

## 2020-11-10 PROCEDURE — 63600175 PHARM REV CODE 636 W HCPCS: Performed by: NURSE ANESTHETIST, CERTIFIED REGISTERED

## 2020-11-10 PROCEDURE — 36000706: Performed by: SURGERY

## 2020-11-10 PROCEDURE — 71000016 HC POSTOP RECOV ADDL HR: Performed by: SURGERY

## 2020-11-10 PROCEDURE — 63600175 PHARM REV CODE 636 W HCPCS: Performed by: SURGERY

## 2020-11-10 PROCEDURE — 49525 PR REPAIR SLIDING INGUINAL HERNIA: ICD-10-PCS | Mod: RT,,, | Performed by: SURGERY

## 2020-11-10 PROCEDURE — 71000015 HC POSTOP RECOV 1ST HR: Performed by: SURGERY

## 2020-11-10 PROCEDURE — 49525 REPAIR ING HERNIA SLIDING: CPT | Mod: RT,,, | Performed by: SURGERY

## 2020-11-10 PROCEDURE — 25000003 PHARM REV CODE 250: Performed by: NURSE ANESTHETIST, CERTIFIED REGISTERED

## 2020-11-10 PROCEDURE — D9220A PRA ANESTHESIA: ICD-10-PCS | Mod: CRNA,,, | Performed by: NURSE ANESTHETIST, CERTIFIED REGISTERED

## 2020-11-10 DEVICE — BARD MESH PERFIX PLUG, LARGE
Type: IMPLANTABLE DEVICE | Site: GROIN | Status: FUNCTIONAL
Brand: BARD MESH PERFIX PLUG

## 2020-11-10 RX ORDER — ONDANSETRON 2 MG/ML
INJECTION INTRAMUSCULAR; INTRAVENOUS
Status: DISCONTINUED | OUTPATIENT
Start: 2020-11-10 | End: 2020-11-10

## 2020-11-10 RX ORDER — DEXAMETHASONE SODIUM PHOSPHATE 4 MG/ML
INJECTION, SOLUTION INTRA-ARTICULAR; INTRALESIONAL; INTRAMUSCULAR; INTRAVENOUS; SOFT TISSUE
Status: DISCONTINUED | OUTPATIENT
Start: 2020-11-10 | End: 2020-11-10

## 2020-11-10 RX ORDER — SODIUM CHLORIDE, SODIUM LACTATE, POTASSIUM CHLORIDE, CALCIUM CHLORIDE 600; 310; 30; 20 MG/100ML; MG/100ML; MG/100ML; MG/100ML
125 INJECTION, SOLUTION INTRAVENOUS CONTINUOUS
Status: DISCONTINUED | OUTPATIENT
Start: 2020-11-10 | End: 2020-11-10 | Stop reason: HOSPADM

## 2020-11-10 RX ORDER — OXYCODONE AND ACETAMINOPHEN 10; 325 MG/1; MG/1
1 TABLET ORAL EVERY 8 HOURS PRN
Qty: 21 TABLET | Refills: 0 | Status: SHIPPED | OUTPATIENT
Start: 2020-11-10 | End: 2020-11-25

## 2020-11-10 RX ORDER — OXYCODONE AND ACETAMINOPHEN 5; 325 MG/1; MG/1
1 TABLET ORAL
Status: DISCONTINUED | OUTPATIENT
Start: 2020-11-10 | End: 2020-11-10 | Stop reason: HOSPADM

## 2020-11-10 RX ORDER — LIDOCAINE HYDROCHLORIDE 10 MG/ML
1 INJECTION, SOLUTION EPIDURAL; INFILTRATION; INTRACAUDAL; PERINEURAL ONCE
Status: DISCONTINUED | OUTPATIENT
Start: 2020-11-10 | End: 2020-11-10 | Stop reason: HOSPADM

## 2020-11-10 RX ORDER — BUPIVACAINE HYDROCHLORIDE AND EPINEPHRINE 5; 5 MG/ML; UG/ML
INJECTION, SOLUTION EPIDURAL; INTRACAUDAL; PERINEURAL
Status: DISCONTINUED | OUTPATIENT
Start: 2020-11-10 | End: 2020-11-10 | Stop reason: HOSPADM

## 2020-11-10 RX ORDER — ROCURONIUM BROMIDE 10 MG/ML
INJECTION, SOLUTION INTRAVENOUS
Status: DISCONTINUED | OUTPATIENT
Start: 2020-11-10 | End: 2020-11-10

## 2020-11-10 RX ORDER — ONDANSETRON 4 MG/1
4 TABLET, FILM COATED ORAL EVERY 6 HOURS PRN
Qty: 30 TABLET | Refills: 0 | Status: SHIPPED | OUTPATIENT
Start: 2020-11-10 | End: 2021-01-04

## 2020-11-10 RX ORDER — SODIUM CHLORIDE, SODIUM LACTATE, POTASSIUM CHLORIDE, CALCIUM CHLORIDE 600; 310; 30; 20 MG/100ML; MG/100ML; MG/100ML; MG/100ML
INJECTION, SOLUTION INTRAVENOUS CONTINUOUS
Status: CANCELLED | OUTPATIENT
Start: 2020-11-10

## 2020-11-10 RX ORDER — MORPHINE SULFATE 4 MG/ML
2 INJECTION, SOLUTION INTRAMUSCULAR; INTRAVENOUS EVERY 5 MIN PRN
Status: DISCONTINUED | OUTPATIENT
Start: 2020-11-10 | End: 2020-11-10 | Stop reason: HOSPADM

## 2020-11-10 RX ORDER — LIDOCAINE HYDROCHLORIDE 20 MG/ML
INJECTION, SOLUTION EPIDURAL; INFILTRATION; INTRACAUDAL; PERINEURAL
Status: DISCONTINUED | OUTPATIENT
Start: 2020-11-10 | End: 2020-11-10

## 2020-11-10 RX ORDER — SODIUM CHLORIDE, SODIUM LACTATE, POTASSIUM CHLORIDE, CALCIUM CHLORIDE 600; 310; 30; 20 MG/100ML; MG/100ML; MG/100ML; MG/100ML
INJECTION, SOLUTION INTRAVENOUS CONTINUOUS
Status: DISCONTINUED | OUTPATIENT
Start: 2020-11-10 | End: 2020-11-10 | Stop reason: HOSPADM

## 2020-11-10 RX ORDER — CIPROFLOXACIN 2 MG/ML
400 INJECTION, SOLUTION INTRAVENOUS
Status: COMPLETED | OUTPATIENT
Start: 2020-11-10 | End: 2020-11-10

## 2020-11-10 RX ORDER — SUCCINYLCHOLINE CHLORIDE 20 MG/ML
INJECTION INTRAMUSCULAR; INTRAVENOUS
Status: DISCONTINUED | OUTPATIENT
Start: 2020-11-10 | End: 2020-11-10

## 2020-11-10 RX ORDER — ONDANSETRON 2 MG/ML
4 INJECTION INTRAMUSCULAR; INTRAVENOUS DAILY PRN
Status: DISCONTINUED | OUTPATIENT
Start: 2020-11-10 | End: 2020-11-10 | Stop reason: HOSPADM

## 2020-11-10 RX ORDER — MIDAZOLAM HYDROCHLORIDE 1 MG/ML
INJECTION, SOLUTION INTRAMUSCULAR; INTRAVENOUS
Status: DISCONTINUED | OUTPATIENT
Start: 2020-11-10 | End: 2020-11-10

## 2020-11-10 RX ORDER — MEPERIDINE HYDROCHLORIDE 50 MG/ML
INJECTION INTRAMUSCULAR; INTRAVENOUS; SUBCUTANEOUS
Status: DISCONTINUED | OUTPATIENT
Start: 2020-11-10 | End: 2020-11-10

## 2020-11-10 RX ORDER — LIDOCAINE HYDROCHLORIDE 10 MG/ML
1 INJECTION, SOLUTION EPIDURAL; INFILTRATION; INTRACAUDAL; PERINEURAL ONCE
Status: CANCELLED | OUTPATIENT
Start: 2020-11-10 | End: 2020-11-10

## 2020-11-10 RX ORDER — KETOROLAC TROMETHAMINE 30 MG/ML
15 INJECTION, SOLUTION INTRAMUSCULAR; INTRAVENOUS ONCE
Status: DISCONTINUED | OUTPATIENT
Start: 2020-11-10 | End: 2020-11-10 | Stop reason: HOSPADM

## 2020-11-10 RX ORDER — PHENYLEPHRINE HYDROCHLORIDE 10 MG/ML
INJECTION INTRAVENOUS
Status: DISCONTINUED | OUTPATIENT
Start: 2020-11-10 | End: 2020-11-10

## 2020-11-10 RX ORDER — PROPOFOL 10 MG/ML
VIAL (ML) INTRAVENOUS
Status: DISCONTINUED | OUTPATIENT
Start: 2020-11-10 | End: 2020-11-10

## 2020-11-10 RX ADMIN — SODIUM CHLORIDE, POTASSIUM CHLORIDE, SODIUM LACTATE AND CALCIUM CHLORIDE: 600; 310; 30; 20 INJECTION, SOLUTION INTRAVENOUS at 09:11

## 2020-11-10 RX ADMIN — ROCURONIUM BROMIDE 10 MG: 10 INJECTION, SOLUTION INTRAVENOUS at 09:11

## 2020-11-10 RX ADMIN — PROPOFOL 10 MG: 10 INJECTION, EMULSION INTRAVENOUS at 10:11

## 2020-11-10 RX ADMIN — ROCURONIUM BROMIDE 30 MG: 10 INJECTION, SOLUTION INTRAVENOUS at 08:11

## 2020-11-10 RX ADMIN — PHENYLEPHRINE HYDROCHLORIDE 100 MCG: 10 INJECTION INTRAVENOUS at 08:11

## 2020-11-10 RX ADMIN — MIDAZOLAM HYDROCHLORIDE 1 MG: 1 INJECTION, SOLUTION INTRAMUSCULAR; INTRAVENOUS at 08:11

## 2020-11-10 RX ADMIN — DEXAMETHASONE SODIUM PHOSPHATE 8 MG: 4 INJECTION, SOLUTION INTRAMUSCULAR; INTRAVENOUS at 08:11

## 2020-11-10 RX ADMIN — MEPERIDINE HYDROCHLORIDE 25 MG: 50 INJECTION INTRAMUSCULAR; INTRAVENOUS; SUBCUTANEOUS at 08:11

## 2020-11-10 RX ADMIN — PHENYLEPHRINE HYDROCHLORIDE 50 MCG: 10 INJECTION INTRAVENOUS at 09:11

## 2020-11-10 RX ADMIN — LIDOCAINE HYDROCHLORIDE 100 MG: 20 INJECTION, SOLUTION EPIDURAL; INFILTRATION; INTRACAUDAL; PERINEURAL at 08:11

## 2020-11-10 RX ADMIN — SUGAMMADEX 200 MG: 100 INJECTION, SOLUTION INTRAVENOUS at 10:11

## 2020-11-10 RX ADMIN — SUCCINYLCHOLINE CHLORIDE 120 MG: 20 INJECTION, SOLUTION INTRAMUSCULAR; INTRAVENOUS at 08:11

## 2020-11-10 RX ADMIN — PHENYLEPHRINE HYDROCHLORIDE 50 MCG: 10 INJECTION INTRAVENOUS at 10:11

## 2020-11-10 RX ADMIN — ROCURONIUM BROMIDE 10 MG: 10 INJECTION, SOLUTION INTRAVENOUS at 10:11

## 2020-11-10 RX ADMIN — ROCURONIUM BROMIDE 20 MG: 10 INJECTION, SOLUTION INTRAVENOUS at 08:11

## 2020-11-10 RX ADMIN — ONDANSETRON 4 MG: 2 INJECTION INTRAMUSCULAR; INTRAVENOUS at 08:11

## 2020-11-10 RX ADMIN — SODIUM CHLORIDE, POTASSIUM CHLORIDE, SODIUM LACTATE AND CALCIUM CHLORIDE: 600; 310; 30; 20 INJECTION, SOLUTION INTRAVENOUS at 07:11

## 2020-11-10 RX ADMIN — PROPOFOL 150 MG: 10 INJECTION, EMULSION INTRAVENOUS at 08:11

## 2020-11-10 RX ADMIN — PHENYLEPHRINE HYDROCHLORIDE 50 MCG: 10 INJECTION INTRAVENOUS at 08:11

## 2020-11-10 RX ADMIN — CIPROFLOXACIN 400 MG: 2 INJECTION, SOLUTION INTRAVENOUS at 08:11

## 2020-11-10 RX ADMIN — SODIUM CHLORIDE, POTASSIUM CHLORIDE, SODIUM LACTATE AND CALCIUM CHLORIDE: 600; 310; 30; 20 INJECTION, SOLUTION INTRAVENOUS at 08:11

## 2020-11-10 NOTE — TRANSFER OF CARE
"Anesthesia Transfer of Care Note    Patient: Mina Valentine    Procedure(s) Performed: Procedure(s) (LRB):  REPAIR, HERNIA, INGUINAL, WITHOUT HISTORY OF PRIOR REPAIR, AGE 6 YEARS OR OLDER (Right)    Patient location: PACU    Anesthesia Type: general    Transport from OR: Transported from OR on room air with adequate spontaneous ventilation    Post pain: adequate analgesia    Post assessment: no apparent anesthetic complications    Post vital signs: stable    Level of consciousness: awake, alert and oriented    Nausea/Vomiting: no nausea/vomiting    Complications: none    Transfer of care protocol was followed      Last vitals:   Visit Vitals  BP (!) 178/84 (BP Location: Right arm, Patient Position: Lying)   Pulse 76   Temp 36.5 °C (97.7 °F) (Oral)   Resp 10   Ht 5' 8" (1.727 m)   Wt 74.4 kg (164 lb)   SpO2 99%   BMI 24.94 kg/m²     "

## 2020-11-10 NOTE — OP NOTE
[facility] - Periop Services  General Surgery  Op Note  11/10/2020      Patient Name: Mina Valentine  MRN: [id number]         SURGEON:  Anthoyn Lezama M.D.     ASSISTANT: None     PREOPERATIVE DIAGNOSIS:  Right inguinal hernia     POSTOPERATIVE DIAGNOSIS:  Right sliding inguinal hernia.  Acute postoperative pain.     SURGICAL PROCEDURE PERFORMED:  Right inguinal hernia repair with mesh, a tension-free.  Ileoinguinal nerve block.    ANESTHESIA: General     INDICATIONS FOR PROCEDURE:  Symptomatic right inguinal hernia    SURGICAL FINDINGS:  Large sliding inguinal hernias in the right groin.  Complete obliteration of the inguinal canal floor.  No other pathology or abnormalities detected.     PROCEDURE IN DETAIL:  In preop holding, Mina Valentine was identified by name, wrist band, and birth date.  He confirmed identity.  Informed consent process reviewed. He verbalized consent as well as understanding of all treatment options, risks, benefits, details of, and indications for each option.  Operative site was marked.  He confirmed the correct site was identified and marked.  Intravenous antibiotics administered.  Transported to operating room. Time-out completed. Transferred to OR bed. Anesthesia induced without difficulty or complications. Operative field prepped and draped in the usual sterile fashion with ChloraPrep, sterile towels, and sterile drapes.  ChloraPrep was permitted to dry for 3 min before placing towels and drapes.  An oblique incision was made in the skin overlying the inguinal canal with a number 15 blade. Subcutaneous tissue divided with electrocautery. External oblique cleaned off on the anterior surface.  External oblique incised with a number 15 blade.  This incision was extended medially through the external inguinal ring with the Metzenbaum scissors in a push cut fashion after identification and isolation of the ileoinguinal nerve. Blunt dissection was then used to mobilize  the spermatic cord.  The spermatic cord was explored with blunt dissection. No indirect inguinal hernia sac identified. Direct inguinal hernia sac was free from surrounding tissues with blunt dissection. Hernia sac was inverted.  Large prefabricated Marlex mesh cone inserted through the defect and permitted to expand beneath the floor (posterior wall) of the inguinal canal.  Plug was secured circumferentially with 3 0 Vicryl suture. Onlay patch was then placed over the floor the canal and secured circumferentially with 3 0 Prolene.  Patch was secured to the pubic tubercle, shelving portion of the inguinal ligament, transversalis fascia and conjoined tendon.  The tails were passed about the spermatic cord and secured to themselves as well as the internal oblique muscle.  Newly created internal inguinal ring was palpated confirmed of appropriate dimensions.  Wound was irrigated copiously.  All irrigation solution was of activated.  Meticulous hemostasis was ensured throughout.  Twenty cc of Exparel was then infiltrated in the operative field for postoperative analgesia in an ileoinguinal nerve block fashion.  Care was taken to avoid the femoral triangle and neurovascular bundle.  External oblique was then closed with running 3 O Vicryl. Subcutaneous tissue and Anibal's fascia was closed with 3 0 Vicryl. Skin was closed with 4 0 Vicryl suture. Sterile Dermabond dressing applied. Emerged from anesthetic without difficulty.  Transported to recovery room in good condition.    TOLERATED: Well    COUNTS:  Correct  on multiple occasions    DRAINS: None     ESTIMATED BLOOD LOSS: 5 cc     SPECIMEN: None     COMPLICATIONS: None     DISPOSITION:  To PACU, stable.      Documented with M*Modal voice recognition software.      Anthony Lezama MD FACS

## 2020-11-10 NOTE — DISCHARGE INSTRUCTIONS
[facility]   [phone]  [facility]      [phone]    Managing nausea    Some people have an upset stomach after surgery. This is often because of anesthesia, pain, or pain medicine, or the stress of surgery. These tips will help you handle nausea and eat healthy foods as you get better. If you were on a special food plan before surgery, ask your healthcare provider if you should follow it while you get better. These tips may help:  · Do not push yourself to eat. Your body will tell you when to eat and how much.  · Start off with clear liquids and soup. They are easier to digest.  · Next try semi-solid foods, such as mashed potatoes, applesauce, and gelatin, as you feel ready.  · Slowly move to solid foods. Dont eat fatty, rich, or spicy foods at first.  · Do not force yourself to have 3 large meals a day. Instead eat smaller amounts more often.  · Take pain medicines with a small amount of solid food, such as crackers or toast, to avoid nausea.       Discharge Instructions for Open Hernia Repair  You had a procedure called an open hernia repai  Activity after surgery  Recommendations include the following:  · After surgery, take it easy for the rest of the day. If you had general anesthesia, dont use machinery or power tools, drink alcohol, or make any major decisions for at least the first 24 hours.  · Dont drive while you are still taking opioid pain medicine, and dont drive until you are able to step firmly on the brake pedal without hesitation.  · Ask others to help with chores and errands while you recover.  · Dont lift anything heavier than 10 pounds until your healthcare provider says its OK.  · Dont mow the lawn, use a vacuum , or do other strenuous activities until your healthcare provider says its OK.  · Walk as often as you feel able.  · Continue the coughing and deep breathing exercises that you learned in the hospital.  · Ask your healthcare  provider when you can expect to return to work.  · Avoid constipation:  ¨ Eat fruits, vegetables, and whole grains.  ¨ Drink 6 to 8 glasses of water a day, unless otherwise instructed.  ¨ Use a laxative or a mild stool softener as instructed by your healthcare provider.  Bandage and incision care  Tips include the following:  · Wash your incision with mild soap and water. Pat it dry. Dont use oils, powders, or lotions on your incision. Do not soak your incision or take tub baths until cleared by your healthcare provider.  Follow-up care  Keep follow-up appointments during your recovery. These allow your healthcare provider to check your progress and make sure youre healing well. During office visits, tell your healthcare provider if you have any new symptoms. And be sure to ask any questions you have.     When to call your healthcare provider  Call your healthcare provider immediately if you have any of the following:  · A large amount of swelling or bruising (some testicular swelling and bruising is common)  · Bleeding  · Increasing pain  · Increased redness or drainage of the incision  · Fever of 100.4°F (38.0°C) or higher, or as directed by your healthcare provider  · Trouble urinating  · Nausea or vomiting   Date Last Reviewed: 7/1/2016  © 2436-3062 [employer]. [address] [place] [address], [place] [address]. All rights reserved. This information is not intended as a substitute for professional medical care. Always follow your healthcare professional's instructions.      DERMABOND ADVANCED   Adhesive is a sterile, liquid skin adhesive that holds wound edges together. The film will usually remain in place for 5 to 10 days, then naturally fall of your skin. The following will answer some of your questions and provide instructions for proper care for your wound while it is healing.    Check Wound Appearance   Some swelling, redness, and pain are common with all wounds and normally will go away as the  wound heals. If swelling, redness, or pain increases, or if the wound feels warm to the touch, contact a doctor. Also contact a doctor if the wound edges reopen or separate.    Caring for Bandages   Do not place tape directly over the DERMABOND adhesive, because removing the tape later may also remove the film.    Avoid Topical Medications   Do not apply liquid or ointment medications or any other product to your wound while the DERMABOND Adhesive is in place. These may loosen the film before your wound is healed.    Keep Wound Dry and Protected   Apply a clean, dry bandage over the wound if necessary to protect it.   You may occasionally and briely wet your wound in the shower or bath. Do not soak or scrub your wound, do not swim, and avoid periods of heavy perspiration until the DERMABOND Adhesive has naturally fallen off.   After showering or bathing, gently blot your wound dry with a soft towel. If a protective dressing is being used, apply a fresh, dry bandage, keeping the tape off the DERMABOND Adhesive.   Protect your wound from injury until the skin has had sufficient time to heal.   Do not scratch, rub, or pick at the DERMABOND Adhesive. This may loosen the film before your wound is healed.   Protect the wound from prolonged exposure to sunlight or tanning lamps while the film is in place.

## 2020-11-10 NOTE — ANESTHESIA PREPROCEDURE EVALUATION
11/10/2020  Mina Valentine is a 80 y.o., male.    Anesthesia Evaluation    I have reviewed the Patient Summary Reports.    I have reviewed the Nursing Notes.    I have reviewed the Medications.     Review of Systems  Anesthesia Hx:  No problems with previous Anesthesia  Neg history of prior surgery. Denies Family Hx of Anesthesia complications.   Denies Personal Hx of Anesthesia complications.   Social:  Former Smoker    Hematology/Oncology:  Hematology Normal   Oncology Normal     EENT/Dental:EENT/Dental Normal   Cardiovascular:   Hypertension, well controlled    Pulmonary:  Pulmonary Normal    Renal/:   Chronic Renal Disease, CRI    Hepatic/GI:  Hepatic/GI Normal    Musculoskeletal:  Musculoskeletal Normal    Neurological:  Neurology Normal    Endocrine:   Hypothyroidism    Dermatological:  Skin Normal    Psych:  Psychiatric Normal           Physical Exam  General:  Well nourished    Airway/Jaw/Neck:  Airway Findings: Mouth Opening: Normal Tongue: Normal  General Airway Assessment: Adult  Mallampati: I  TM Distance: 4 - 6 cm        Eyes/Ears/Nose:  EYES/EARS/NOSE FINDINGS: Normal   Dental:  DENTAL FINDINGS: Normal   Chest/Lungs:  Chest/Lungs Clear    Heart/Vascular:  Heart Findings: Normal Heart murmur: negative Vascular Findings: Normal    Abdomen:  Abdomen Findings: Normal    Musculoskeletal:  Musculoskeletal Findings: Normal   Skin:  Skin Findings: Normal    Mental Status:  Mental Status Findings: Normal        Anesthesia Plan  Type of Anesthesia, risks & benefits discussed:  Anesthesia Type:  general  Patient's Preference:   Intra-op Monitoring Plan: standard ASA monitors  Intra-op Monitoring Plan Comments:   Post Op Pain Control Plan:   Post Op Pain Control Plan Comments:   Induction:   IV  Beta Blocker:  Patient is not currently on a Beta-Blocker (No further documentation required).       Informed  Consent: Patient understands risks and agrees with Anesthesia plan.  Questions answered. Anesthesia consent signed with patient.  ASA Score: 2     Day of Surgery Review of History & Physical: I have interviewed and examined the patient. I have reviewed the patient's H&P dated:    H&P update referred to the provider.         Ready For Surgery From Anesthesia Perspective.

## 2020-11-10 NOTE — ANESTHESIA PROCEDURE NOTES
Intubation  Performed by: Juni German CRNA  Authorized by: Misha Escamilla MD     Intubation:     Induction:  Intravenous    Intubated:  Postinduction    Mask Ventilation:  Easy mask    Attempts:  1    Attempted By:  CRNA    Blade:  Silvestre 3    Laryngeal View Grade: Grade III - only epiglottis visible      Difficult Airway Encountered?: No      Airway Device:  Oral endotracheal tube    Airway Device Size:  7.5    Style/Cuff Inflation:  Cuffed    Inflation Amount (mL):  9    Tube secured:  22    Secured at:  The lips    Placement Verified By:  Capnometry    Complicating Factors:  Poor neck/head extension and anterior larynx    Findings Post-Intubation:  BS equal bilateral

## 2020-11-10 NOTE — PLAN OF CARE
To PACU via stretcher s/p right inguinal hernia repair. Connected to PACU monitors-alarms on. Resting in bed in semi-fowlers position eyes closed. Awakens easily. Resp even and unlabored. O2 4 liters nc applied to nares. 20G to RH intact.IVF infusing via gravity.  No signs of infiltration observed. Ice pack applied over incision site. Denies c/o pain or nausea. VSS. Will monitor closely.

## 2020-11-11 NOTE — ANESTHESIA POSTPROCEDURE EVALUATION
Anesthesia Post Evaluation    Patient: Mina Valentine    Procedure(s) Performed: Procedure(s) (LRB):  REPAIR, HERNIA, INGUINAL, WITHOUT HISTORY OF PRIOR REPAIR, AGE 5 YEARS OR OLDER (Right)    Final Anesthesia Type: general    Patient location during evaluation: PACU  Patient participation: Yes- Able to Participate  Level of consciousness: awake and awake and alert  Post-procedure vital signs: reviewed and stable  Pain management: adequate  Airway patency: patent    PONV status at discharge: No PONV  Anesthetic complications: no      Cardiovascular status: blood pressure returned to baseline  Respiratory status: unassisted and spontaneous ventilation  Hydration status: euvolemic  Follow-up not needed.          Vitals Value Taken Time   /80 11/10/20 1218   Temp 36.5 °C (97.7 °F) 11/10/20 1044   Pulse 73 11/10/20 1219   Resp 9 11/10/20 1219   SpO2 97 % 11/10/20 1219   Vitals shown include unvalidated device data.      Event Time   Out of Recovery 11:00:00         Pain/Ramin Score: Ramin Score: 10 (11/10/2020 12:00 PM)

## 2020-11-17 ENCOUNTER — PATIENT MESSAGE (OUTPATIENT)
Dept: FAMILY MEDICINE | Facility: CLINIC | Age: 80
End: 2020-11-17

## 2020-11-17 DIAGNOSIS — I10 ESSENTIAL HYPERTENSION: ICD-10-CM

## 2020-11-18 RX ORDER — LOSARTAN POTASSIUM 100 MG/1
100 TABLET ORAL DAILY
Qty: 90 TABLET | Refills: 3 | Status: SHIPPED | OUTPATIENT
Start: 2020-11-18 | End: 2021-01-04 | Stop reason: ALTCHOICE

## 2020-11-18 RX ORDER — PRAVASTATIN SODIUM 40 MG/1
40 TABLET ORAL DAILY
Qty: 90 TABLET | Refills: 3 | Status: SHIPPED | OUTPATIENT
Start: 2020-11-18 | End: 2021-09-11

## 2020-11-25 ENCOUNTER — OFFICE VISIT (OUTPATIENT)
Dept: SURGERY | Facility: CLINIC | Age: 80
End: 2020-11-25
Payer: MEDICARE

## 2020-11-25 ENCOUNTER — PATIENT MESSAGE (OUTPATIENT)
Dept: SURGERY | Facility: CLINIC | Age: 80
End: 2020-11-25

## 2020-11-25 VITALS
OXYGEN SATURATION: 97 % | RESPIRATION RATE: 14 BRPM | WEIGHT: 164 LBS | TEMPERATURE: 98 F | DIASTOLIC BLOOD PRESSURE: 81 MMHG | BODY MASS INDEX: 24.86 KG/M2 | HEART RATE: 77 BPM | SYSTOLIC BLOOD PRESSURE: 192 MMHG | HEIGHT: 68 IN

## 2020-11-25 DIAGNOSIS — Z48.89 ENCOUNTER FOR POSTOPERATIVE CARE: Primary | ICD-10-CM

## 2020-11-25 PROCEDURE — 1157F PR ADVANCE CARE PLAN OR EQUIV PRESENT IN MEDICAL RECORD: ICD-10-PCS | Mod: S$GLB,,, | Performed by: SURGERY

## 2020-11-25 PROCEDURE — 99024 PR POST-OP FOLLOW-UP VISIT: ICD-10-PCS | Mod: S$GLB,,, | Performed by: SURGERY

## 2020-11-25 PROCEDURE — 1126F AMNT PAIN NOTED NONE PRSNT: CPT | Mod: S$GLB,,, | Performed by: SURGERY

## 2020-11-25 PROCEDURE — 99024 POSTOP FOLLOW-UP VISIT: CPT | Mod: S$GLB,,, | Performed by: SURGERY

## 2020-11-25 PROCEDURE — 1126F PR PAIN SEVERITY QUANTIFIED, NO PAIN PRESENT: ICD-10-PCS | Mod: S$GLB,,, | Performed by: SURGERY

## 2020-11-25 PROCEDURE — 1157F ADVNC CARE PLAN IN RCRD: CPT | Mod: S$GLB,,, | Performed by: SURGERY

## 2020-11-25 NOTE — LETTER
November 25, 2020      Blanca Hatch MD  [address] [place] [address]           [facility] - General Surgery  [address] [place] [address]  Phone: [phone]  Fax: [phone]          Patient: Mina Valentine   MR Number: [id number]   YOB: 1940   Date of Visit: 11/25/2020       Dear Dr. Blanca Hatch:    Thank you for referring Mina Valentine to me for evaluation. Attached you will find relevant portions of my assessment and plan of care.    If you have questions, please do not hesitate to call me. I look forward to following Mina Valentine along with you.    Sincerely,    Anthony Lezama MD    Enclosure  CC:  No Recipients    If you would like to receive this communication electronically, please contact [email or web address] or [phone] to request more information on [facility] Link access.    For providers and/or their staff who would like to refer a patient to [facility], please contact us through our one-stop-shop provider referral line, [facility], at [phone].    If you feel you have received this communication in error or would no longer like to receive these types of communications, please e-mail [email or web address]

## 2020-11-25 NOTE — PROGRESS NOTES
"Subjective:       Patient ID: Mina Valentine is a 80 y.o. male.    Chief Complaint: Post-op Evaluation (hernia repair 11/10/20)      HPI:  Mr. Valentine presents today with no complaints.  He recently had an uncomplicated right inguinal hernia repair.  He is not experiencing any pain, nausea, vomiting, diarrhea, constipation, fevers, chills, wound concerns, etc.  No wound redness, swelling, drainage, odor, etc.  Activity tolerance has returned to normal.  Appetite is excellent.  Overall he feels "great".      Allergies & Meds:  Review of patient's allergies indicates:   Allergen Reactions    Penicillins Rash     Other reaction(s): Flushing (skin)       Current Outpatient Medications   Medication Sig Dispense Refill    fexofenadine (ALLEGRA) 180 MG tablet Take 1 tablet (180 mg total) by mouth once daily. For allergies 90 tablet 3    latanoprost 0.005 % ophthalmic solution       levothyroxine (SYNTHROID) 75 MCG tablet TAKE 1 TABLET EVERY DAY ( NEED  APPOINTMENT AND LAB ) 90 tablet 3    losartan (COZAAR) 100 MG tablet Take 1 tablet (100 mg total) by mouth once daily. 90 tablet 3    omega 3-dha-epa-fish oil (FISH OIL) 120-180-500 mg Cap       ondansetron (ZOFRAN) 4 MG tablet Take 1 tablet (4 mg total) by mouth every 6 (six) hours as needed. 30 tablet 0    pravastatin (PRAVACHOL) 40 MG tablet Take 1 tablet (40 mg total) by mouth once daily. 90 tablet 3    triamcinolone acetonide 0.1% (KENALOG) 0.1 % ointment Apply topically 2 (two) times daily. for 14 days 30 g 1     Current Facility-Administered Medications   Medication Dose Route Frequency Provider Last Rate Last Dose    cyanocobalamin injection 1,000 mcg  1,000 mcg Intramuscular Once Blanca H. 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 2009    EYE SURGERY      cataract surgery    HERNIA REPAIR  2004    belly button    hydrocele  1960    PROSTATE SURGERY  2007    seed implant    UMBILICAL HERNIA REPAIR      x2       Family History   Problem Relation Age of Onset    Dementia Mother     Coronary artery disease Father 58         MI    Diabetes Father         type 1    Heart disease Father     Cancer Brother 89        brain cancer    Heart disease Sister         a fib    No Known Problems Daughter     No Known Problems Son     No Known Problems Sister     No Known Problems Daughter        Social History     Tobacco Use    Smoking status: Former Smoker     Packs/day: 1.00     Years: 10.00     Pack years: 10.00     Types: Cigarettes, Cigars     Quit date: 1985     Years since quittin.8    Smokeless tobacco: Never Used   Substance Use Topics    Alcohol use: No     Frequency: Monthly or less     Drinks per session: 1 or 2     Binge frequency: Never    Drug use: No         Review of Systems   Constitutional: Negative for appetite change, chills, fatigue, fever and unexpected weight change.   HENT: Negative for congestion, dental problem, ear pain, mouth sores, postnasal drip, rhinorrhea, sore throat, tinnitus, trouble swallowing and voice change.    Eyes: Negative for photophobia, pain, discharge and visual disturbance.   Respiratory: Negative for cough, chest tightness, shortness of breath and wheezing.    Cardiovascular: Negative for chest pain, palpitations  and leg swelling.   Gastrointestinal: Negative for abdominal pain, blood in stool, constipation, diarrhea, nausea and vomiting.   Endocrine: Negative for cold intolerance, heat intolerance, polydipsia, polyphagia and polyuria.   Genitourinary: Negative for difficulty urinating, dysuria, flank pain, frequency, hematuria and urgency.   Musculoskeletal: Negative for arthralgias, joint swelling and myalgias.   Skin: Negative for color change and rash.   Allergic/Immunologic: Negative for immunocompromised state.   Neurological: Negative for dizziness, tremors, seizures, syncope, speech difficulty, weakness, numbness and headaches.   Hematological: Negative for adenopathy. Does not bruise/bleed easily.   Psychiatric/Behavioral: Negative for agitation, confusion, hallucinations, self-injury and suicidal ideas. The patient is not nervous/anxious.        Objective:      Physical Exam  Constitutional:       General: He is not in acute distress (no acute distress).     Appearance: Normal appearance.   Cardiovascular:      Rate and Rhythm: Normal rate and regular rhythm.      Heart sounds: Normal heart sounds.   Pulmonary:      Effort: Pulmonary effort is normal. Respiratory distress: no respiratory distress.      Breath sounds: Normal breath sounds.   Abdominal:      General: Bowel sounds are normal. Distention: nondistended.      Palpations: Abdomen is soft.      Tenderness: Tenderness: nontender.   Skin:     General: Skin is warm and dry.      Comments: Incision healed well without erythema, edema, exudate, induration, fluctuance, crepitus, necrosis, discoloration, separation   Neurological:      Mental Status: He is alert.               Assessment:       Satisfactory postop recovery.  No evident complications.  Incision healed.    1. Encounter for postoperative care          Plan:     Encounter for postoperative care          Follow up for any concerns or questions as needed, resume care with PCP.

## 2020-12-08 ENCOUNTER — TELEPHONE (OUTPATIENT)
Dept: FAMILY MEDICINE | Facility: CLINIC | Age: 80
End: 2020-12-08

## 2020-12-08 ENCOUNTER — OFFICE VISIT (OUTPATIENT)
Dept: FAMILY MEDICINE | Facility: CLINIC | Age: 80
End: 2020-12-08
Payer: MEDICARE

## 2020-12-08 ENCOUNTER — PATIENT MESSAGE (OUTPATIENT)
Dept: FAMILY MEDICINE | Facility: CLINIC | Age: 80
End: 2020-12-08

## 2020-12-08 VITALS
WEIGHT: 165.13 LBS | OXYGEN SATURATION: 99 % | HEIGHT: 68 IN | SYSTOLIC BLOOD PRESSURE: 178 MMHG | TEMPERATURE: 98 F | RESPIRATION RATE: 18 BRPM | HEART RATE: 76 BPM | BODY MASS INDEX: 25.03 KG/M2 | DIASTOLIC BLOOD PRESSURE: 76 MMHG

## 2020-12-08 DIAGNOSIS — I10 ESSENTIAL HYPERTENSION: Primary | ICD-10-CM

## 2020-12-08 PROCEDURE — 99999 PR PBB SHADOW E&M-EST. PATIENT-LVL V: CPT | Mod: PBBFAC,,, | Performed by: FAMILY MEDICINE

## 2020-12-08 PROCEDURE — 1126F AMNT PAIN NOTED NONE PRSNT: CPT | Mod: S$GLB,,, | Performed by: FAMILY MEDICINE

## 2020-12-08 PROCEDURE — 99999 PR PBB SHADOW E&M-EST. PATIENT-LVL V: ICD-10-PCS | Mod: PBBFAC,,, | Performed by: FAMILY MEDICINE

## 2020-12-08 PROCEDURE — 99213 OFFICE O/P EST LOW 20 MIN: CPT | Mod: S$GLB,,, | Performed by: FAMILY MEDICINE

## 2020-12-08 PROCEDURE — 1101F PT FALLS ASSESS-DOCD LE1/YR: CPT | Mod: CPTII,S$GLB,, | Performed by: FAMILY MEDICINE

## 2020-12-08 PROCEDURE — 3288F PR FALLS RISK ASSESSMENT DOCUMENTED: ICD-10-PCS | Mod: CPTII,S$GLB,, | Performed by: FAMILY MEDICINE

## 2020-12-08 PROCEDURE — 1157F PR ADVANCE CARE PLAN OR EQUIV PRESENT IN MEDICAL RECORD: ICD-10-PCS | Mod: S$GLB,,, | Performed by: FAMILY MEDICINE

## 2020-12-08 PROCEDURE — 1126F PR PAIN SEVERITY QUANTIFIED, NO PAIN PRESENT: ICD-10-PCS | Mod: S$GLB,,, | Performed by: FAMILY MEDICINE

## 2020-12-08 PROCEDURE — 3078F DIAST BP <80 MM HG: CPT | Mod: CPTII,S$GLB,, | Performed by: FAMILY MEDICINE

## 2020-12-08 PROCEDURE — 1101F PR PT FALLS ASSESS DOC 0-1 FALLS W/OUT INJ PAST YR: ICD-10-PCS | Mod: CPTII,S$GLB,, | Performed by: FAMILY MEDICINE

## 2020-12-08 PROCEDURE — 1157F ADVNC CARE PLAN IN RCRD: CPT | Mod: S$GLB,,, | Performed by: FAMILY MEDICINE

## 2020-12-08 PROCEDURE — 3077F PR MOST RECENT SYSTOLIC BLOOD PRESSURE >= 140 MM HG: ICD-10-PCS | Mod: CPTII,S$GLB,, | Performed by: FAMILY MEDICINE

## 2020-12-08 PROCEDURE — 3077F SYST BP >= 140 MM HG: CPT | Mod: CPTII,S$GLB,, | Performed by: FAMILY MEDICINE

## 2020-12-08 PROCEDURE — 1159F PR MEDICATION LIST DOCUMENTED IN MEDICAL RECORD: ICD-10-PCS | Mod: S$GLB,,, | Performed by: FAMILY MEDICINE

## 2020-12-08 PROCEDURE — 99213 PR OFFICE/OUTPT VISIT, EST, LEVL III, 20-29 MIN: ICD-10-PCS | Mod: S$GLB,,, | Performed by: FAMILY MEDICINE

## 2020-12-08 PROCEDURE — 3078F PR MOST RECENT DIASTOLIC BLOOD PRESSURE < 80 MM HG: ICD-10-PCS | Mod: CPTII,S$GLB,, | Performed by: FAMILY MEDICINE

## 2020-12-08 PROCEDURE — 3288F FALL RISK ASSESSMENT DOCD: CPT | Mod: CPTII,S$GLB,, | Performed by: FAMILY MEDICINE

## 2020-12-08 PROCEDURE — 1159F MED LIST DOCD IN RCRD: CPT | Mod: S$GLB,,, | Performed by: FAMILY MEDICINE

## 2020-12-08 NOTE — PATIENT INSTRUCTIONS
Okay to take your medication in the morning, EXCEPT for pravastatin (choelsterol) and the other half of your losartan (blood pressure).  We will give it about 4-6 weeks to see how the body responds.

## 2020-12-08 NOTE — TELEPHONE ENCOUNTER
Calling patient about elevated b/p at last PCP visit. No answer to all contact, no vm,  sending portal message at this time

## 2020-12-08 NOTE — PROGRESS NOTES
Subjective:       Patient ID: Mina Valentine is a 80 y.o. male.    Chief Complaint: Follow-up (follow up on blood pressure )    Here for blood pressure follow up. BP is better at home than here at the doctor's office. Denies CP, SOB, HA, paresthesias, weakness, dizziness, lightheadedness. No new symptoms.    Patient feels certain he has some degree of white coat hypertension. He also thinks his losartan might not be working as well because he has been taking it for years.    Depression Patient Health Questionnaire 5/18/2018 10/18/2016 3/31/2015   Over the last two weeks how often have you been bothered by little interest or pleasure in doing things 0 0 0   Over the last two weeks how often have you been bothered by feeling down, depressed or hopeless 0 0 0   PHQ-2 Total Score 0 0 0   Over the last two weeks how often have you been bothered by trouble falling or staying asleep, or sleeping too much - 0 0   Over the last two weeks how often have you been bothered by feeling tired or having little energy - 0 0   Over the last two weeks how often have you been bothered by a poor appetite or overeating - 0 0   Over the last two weeks how often have you been bothered by feeling bad about yourself - or that you are a failure or have let yourself or your family down - 0 0   Over the last two weeks how often have you been bothered by trouble concentrating on things, such as reading the newspaper or watching television - 0 0   Over the last two weeks how often have you been bothered by moving or speaking so slowly that other people could have noticed. Or the opposite - being so fidgety or restless that you have been moving around a lot more than usual. - 0 0   Over the last two weeks how often have you been bothered by thoughts that you would be better off dead, or of hurting yourself - 0 0   If you checked off any problems, how difficult have these problems made it for you to do your work, take care of things at home or  get along with other people? - Not difficult at all Not difficult at all   Total Score - 0 0     No flowsheet data found.    Review of Systems   All other systems reviewed and are negative.        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 2009    CORONARY ARTERY BYPASS GRAFT      EYE SURGERY      cataract surgery    HERNIA REPAIR  2004    belly button    hydrocele  1960    PROSTATE SURGERY  2007    seed implant    UMBILICAL HERNIA REPAIR      x2     Family History   Problem Relation Age of Onset    Dementia Mother     Coronary artery disease Father 58         MI    Diabetes Father         type 1    Heart disease Father     Cancer Brother 89        brain cancer    Heart disease Sister         a fib    No Known Problems Daughter     No Known Problems Son     No Known Problems Sister     No Known Problems Daughter        Review of patient's allergies indicates:   Allergen Reactions    Penicillins Rash     Other reaction(s): Flushing (skin)       Current Outpatient Medications:     fexofenadine (ALLEGRA) 180 MG tablet, Take 1 tablet (180 mg total) by mouth once daily. For allergies, Disp: 90 tablet, Rfl: 3    hydroCHLOROthiazide (HYDRODIURIL) 12.5 MG Tab, Take 1 tablet (12.5 mg total) by mouth once daily. With your morning blood pressure pill.,  "Disp: 90 tablet, Rfl: 3    hydroCHLOROthiazide (HYDRODIURIL) 25 MG tablet, Take 0.5 tablets (12.5 mg total) by mouth once daily. Pharmacist, if possible, please cut in half for patient--thank you very much. I am writing for HALF tablet to save $, as this will be a cash pay., Disp: 15 tablet, Rfl: 0    latanoprost 0.005 % ophthalmic solution, , Disp: , Rfl:     levothyroxine (SYNTHROID) 75 MCG tablet, TAKE 1 TABLET EVERY DAY ( NEED  APPOINTMENT AND LAB ), Disp: 90 tablet, Rfl: 3    losartan (COZAAR) 100 MG tablet, Take 1 tablet (100 mg total) by mouth once daily., Disp: 90 tablet, Rfl: 3    omega 3-dha-epa-fish oil (FISH OIL) 120-180-500 mg Cap, , Disp: , Rfl:     ondansetron (ZOFRAN) 4 MG tablet, Take 1 tablet (4 mg total) by mouth every 6 (six) hours as needed., Disp: 30 tablet, Rfl: 0    pravastatin (PRAVACHOL) 40 MG tablet, Take 1 tablet (40 mg total) by mouth once daily., Disp: 90 tablet, Rfl: 3    triamcinolone acetonide 0.1% (KENALOG) 0.1 % ointment, Apply topically 2 (two) times daily. for 14 days, Disp: 30 g, Rfl: 1    Current Facility-Administered Medications:     cyanocobalamin injection 1,000 mcg, 1,000 mcg, Intramuscular, Once, Blanca H. MD [facility]en      Objective:      BP (!) 178/76 (BP Location: Right arm, Patient Position: Sitting, BP Method: Medium (Automatic)) Comment (BP Method): Patient blood pressure machine  Pulse 76   Temp 98.1 °F (36.7 °C) (Temporal)   Resp 18   Ht 5' 8" (1.727 m)   Wt 74.9 kg (165 lb 2 oz)   SpO2 99%   BMI 25.11 kg/m²   Physical Exam  Vitals signs and nursing note reviewed.   Constitutional:       General: He is not in acute distress.     Appearance: Normal appearance. He is normal weight. He is not ill-appearing, toxic-appearing or diaphoretic.   HENT:      Head: Normocephalic and atraumatic.      Right Ear: External ear normal.      Left Ear: External ear normal.   Eyes:      General: No scleral icterus.        Right eye: No discharge.         Left eye: No " discharge.      Extraocular Movements: Extraocular movements intact.      Conjunctiva/sclera: Conjunctivae normal.   Neck:      Musculoskeletal: Normal range of motion and neck supple.   Cardiovascular:      Rate and Rhythm: Normal rate and regular rhythm.      Pulses: Normal pulses.      Heart sounds: Normal heart sounds. No murmur. No friction rub. No gallop.    Pulmonary:      Effort: Pulmonary effort is normal. No respiratory distress.      Breath sounds: Normal breath sounds. No wheezing, rhonchi or rales.   Abdominal:      General: Abdomen is flat.      Palpations: Abdomen is soft.   Musculoskeletal: Normal range of motion.         General: No swelling or tenderness.      Right lower leg: No edema.      Left lower leg: No edema.   Skin:     General: Skin is warm and dry.      Capillary Refill: Capillary refill takes less than 2 seconds.      Coloration: Skin is not jaundiced or pale.   Neurological:      General: No focal deficit present.      Mental Status: He is alert and oriented to person, place, and time. Mental status is at baseline.      Motor: No weakness.      Coordination: Coordination normal.      Gait: Gait normal.   Psychiatric:         Mood and Affect: Mood normal.         Behavior: Behavior normal.         Thought Content: Thought content normal.         Judgment: Judgment normal.         Assessment:       1. Essential hypertension        Plan:       Essential hypertension          Patient likely metabolizing his blood pressure medication quickly, and we will try taking HALF in the morning and HALF in the evening.    Okay to take your medication in the morning, EXCEPT for pravastatin (choelsterol) and the other half of your losartan (blood pressure).  We will give it about 4-6 weeks to see how the body responds.    Strict return precautions reviewed and patient verbalized understanding. Risks, benefits, and alternatives to the plan were reviewed in detail and all questions answered to the  patient's satisfaction. 25 minutes were spent with patient, >50% of time based on counseling and coordination of care.    No follow-ups on file.    F/u 2 weeks nurse visit for BP check, and 4 weeks for office visit with me.

## 2020-12-15 DIAGNOSIS — E03.9 HYPOTHYROIDISM, UNSPECIFIED TYPE: ICD-10-CM

## 2020-12-15 RX ORDER — LEVOTHYROXINE SODIUM 75 UG/1
TABLET ORAL
Qty: 90 TABLET | Refills: 3 | Status: CANCELLED | OUTPATIENT
Start: 2020-12-15

## 2020-12-15 NOTE — TELEPHONE ENCOUNTER
----- Message from Soumya Yoder sent at 12/15/2020  8:28 AM CST -----  Contact: pt  Type:  RX Refill Request    Who Called:  patient  Refill or New Rx:  refil  RX Name and Strength:  #levothyroxine (SYNTHRdaily#  Is this a 30 day or 90 day RX:  90  Preferred Pharmacy with phone number:  - [facility] - [facility] [address];  Local or Mail Order: mail  Ordering Provider:    Best Call Back Number:  [phone]  Additional Information:  patient is almost out of this medication

## 2020-12-15 NOTE — TELEPHONE ENCOUNTER
Patient requesting refill of Levothyroxine. Upon further assessment last office visit with Dr Canseco noted on 8-15-18. Attempted to reach patient by phone for notification. No answer received.  Portal message forwarded to patient for notification.

## 2020-12-16 RX ORDER — LEVOTHYROXINE SODIUM 75 UG/1
TABLET ORAL
Qty: 90 TABLET | Refills: 3 | Status: SHIPPED | OUTPATIENT
Start: 2020-12-16 | End: 2021-10-25 | Stop reason: SDUPTHER

## 2020-12-17 ENCOUNTER — PATIENT MESSAGE (OUTPATIENT)
Dept: ADMINISTRATIVE | Facility: OTHER | Age: 80
End: 2020-12-17

## 2020-12-17 ENCOUNTER — PATIENT MESSAGE (OUTPATIENT)
Dept: FAMILY MEDICINE | Facility: CLINIC | Age: 80
End: 2020-12-17

## 2020-12-17 ENCOUNTER — DOCUMENTATION ONLY (OUTPATIENT)
Dept: FAMILY MEDICINE | Facility: CLINIC | Age: 80
End: 2020-12-17

## 2020-12-17 ENCOUNTER — TELEPHONE (OUTPATIENT)
Dept: FAMILY MEDICINE | Facility: CLINIC | Age: 80
End: 2020-12-17

## 2020-12-17 NOTE — TELEPHONE ENCOUNTER
----- Message from RT Ny sent at 12/17/2020 12:21 PM CST -----  Contact: pt  I do not know who to reach out to regarding this program. That is the only reason I am sending it to you  ----- Message -----  From: Soumya Yoder  Sent: 12/17/2020  12:06 PM CST  To: [facility] Rios Staff    Patient called and asked for a call back he is interested in the Digital Program thru [facility],  Patient was called this morning he is asking if you will call in for this Program to get   Started.     Call back [phone]

## 2020-12-22 ENCOUNTER — CLINICAL SUPPORT (OUTPATIENT)
Dept: FAMILY MEDICINE | Facility: CLINIC | Age: 80
End: 2020-12-22
Payer: MEDICARE

## 2020-12-22 ENCOUNTER — OFFICE VISIT (OUTPATIENT)
Dept: FAMILY MEDICINE | Facility: CLINIC | Age: 80
End: 2020-12-22
Payer: MEDICARE

## 2020-12-22 VITALS — DIASTOLIC BLOOD PRESSURE: 80 MMHG | SYSTOLIC BLOOD PRESSURE: 150 MMHG

## 2020-12-22 VITALS
OXYGEN SATURATION: 98 % | DIASTOLIC BLOOD PRESSURE: 72 MMHG | RESPIRATION RATE: 15 BRPM | SYSTOLIC BLOOD PRESSURE: 192 MMHG | WEIGHT: 166.69 LBS | BODY MASS INDEX: 25.26 KG/M2 | HEIGHT: 68 IN | HEART RATE: 62 BPM

## 2020-12-22 DIAGNOSIS — I10 UNCONTROLLED HYPERTENSION: ICD-10-CM

## 2020-12-22 DIAGNOSIS — I10 ESSENTIAL HYPERTENSION: Primary | ICD-10-CM

## 2020-12-22 PROCEDURE — 3288F PR FALLS RISK ASSESSMENT DOCUMENTED: ICD-10-PCS | Mod: CPTII,S$GLB,, | Performed by: FAMILY MEDICINE

## 2020-12-22 PROCEDURE — 3078F DIAST BP <80 MM HG: CPT | Mod: CPTII,S$GLB,, | Performed by: FAMILY MEDICINE

## 2020-12-22 PROCEDURE — 1157F ADVNC CARE PLAN IN RCRD: CPT | Mod: S$GLB,,, | Performed by: FAMILY MEDICINE

## 2020-12-22 PROCEDURE — 99999 PR PBB SHADOW E&M-EST. PATIENT-LVL I: ICD-10-PCS | Mod: PBBFAC,,,

## 2020-12-22 PROCEDURE — 1126F PR PAIN SEVERITY QUANTIFIED, NO PAIN PRESENT: ICD-10-PCS | Mod: S$GLB,,, | Performed by: FAMILY MEDICINE

## 2020-12-22 PROCEDURE — 1126F AMNT PAIN NOTED NONE PRSNT: CPT | Mod: S$GLB,,, | Performed by: FAMILY MEDICINE

## 2020-12-22 PROCEDURE — 1159F PR MEDICATION LIST DOCUMENTED IN MEDICAL RECORD: ICD-10-PCS | Mod: S$GLB,,, | Performed by: FAMILY MEDICINE

## 2020-12-22 PROCEDURE — 3077F PR MOST RECENT SYSTOLIC BLOOD PRESSURE >= 140 MM HG: ICD-10-PCS | Mod: CPTII,S$GLB,, | Performed by: FAMILY MEDICINE

## 2020-12-22 PROCEDURE — 99213 OFFICE O/P EST LOW 20 MIN: CPT | Mod: S$GLB,,, | Performed by: FAMILY MEDICINE

## 2020-12-22 PROCEDURE — 1157F PR ADVANCE CARE PLAN OR EQUIV PRESENT IN MEDICAL RECORD: ICD-10-PCS | Mod: S$GLB,,, | Performed by: FAMILY MEDICINE

## 2020-12-22 PROCEDURE — 99999 PR PBB SHADOW E&M-EST. PATIENT-LVL III: CPT | Mod: PBBFAC,,, | Performed by: FAMILY MEDICINE

## 2020-12-22 PROCEDURE — 99999 PR PBB SHADOW E&M-EST. PATIENT-LVL III: ICD-10-PCS | Mod: PBBFAC,,, | Performed by: FAMILY MEDICINE

## 2020-12-22 PROCEDURE — 3288F FALL RISK ASSESSMENT DOCD: CPT | Mod: CPTII,S$GLB,, | Performed by: FAMILY MEDICINE

## 2020-12-22 PROCEDURE — 3078F PR MOST RECENT DIASTOLIC BLOOD PRESSURE < 80 MM HG: ICD-10-PCS | Mod: CPTII,S$GLB,, | Performed by: FAMILY MEDICINE

## 2020-12-22 PROCEDURE — 1159F MED LIST DOCD IN RCRD: CPT | Mod: S$GLB,,, | Performed by: FAMILY MEDICINE

## 2020-12-22 PROCEDURE — 99213 PR OFFICE/OUTPT VISIT, EST, LEVL III, 20-29 MIN: ICD-10-PCS | Mod: S$GLB,,, | Performed by: FAMILY MEDICINE

## 2020-12-22 PROCEDURE — 99999 PR PBB SHADOW E&M-EST. PATIENT-LVL I: CPT | Mod: PBBFAC,,,

## 2020-12-22 PROCEDURE — 1101F PR PT FALLS ASSESS DOC 0-1 FALLS W/OUT INJ PAST YR: ICD-10-PCS | Mod: CPTII,S$GLB,, | Performed by: FAMILY MEDICINE

## 2020-12-22 PROCEDURE — 1101F PT FALLS ASSESS-DOCD LE1/YR: CPT | Mod: CPTII,S$GLB,, | Performed by: FAMILY MEDICINE

## 2020-12-22 PROCEDURE — 3077F SYST BP >= 140 MM HG: CPT | Mod: CPTII,S$GLB,, | Performed by: FAMILY MEDICINE

## 2020-12-22 RX ORDER — HYDROCHLOROTHIAZIDE 12.5 MG/1
12.5 TABLET ORAL DAILY
Qty: 90 TABLET | Refills: 3 | Status: SHIPPED | OUTPATIENT
Start: 2020-12-22 | End: 2021-01-22

## 2020-12-22 NOTE — PROGRESS NOTES
Mina Valentine 80 y.o. male is here today for Blood Pressure check.   Pt has a history of hypertension.   Review of patient's allergies indicates:   Allergen Reactions    Penicillins Rash     Other reaction(s): Flushing (skin)     Creatinine   Date Value Ref Range Status   10/14/2020 1.4 0.5 - 1.4 mg/dL Final     Sodium   Date Value Ref Range Status   10/14/2020 139 136 - 145 mmol/L Final     Potassium   Date Value Ref Range Status   10/14/2020 3.7 3.5 - 5.1 mmol/L Final   ]  Patient verifies taking blood pressure medication, losartan on a regular basis at the same time of the day.     Current Outpatient Medications:     fexofenadine (ALLEGRA) 180 MG tablet, Take 1 tablet (180 mg total) by mouth once daily. For allergies, Disp: 90 tablet, Rfl: 3    latanoprost 0.005 % ophthalmic solution, , Disp: , Rfl:     levothyroxine (SYNTHROID) 75 MCG tablet, TAKE 1 TABLET EVERY DAY ( NEED  APPOINTMENT AND LAB ), Disp: 90 tablet, Rfl: 3    losartan (COZAAR) 100 MG tablet, Take 1 tablet (100 mg total) by mouth once daily., Disp: 90 tablet, Rfl: 3    omega 3-dha-epa-fish oil (FISH OIL) 120-180-500 mg Cap, , Disp: , Rfl:     ondansetron (ZOFRAN) 4 MG tablet, Take 1 tablet (4 mg total) by mouth every 6 (six) hours as needed., Disp: 30 tablet, Rfl: 0    pravastatin (PRAVACHOL) 40 MG tablet, Take 1 tablet (40 mg total) by mouth once daily., Disp: 90 tablet, Rfl: 3    triamcinolone acetonide 0.1% (KENALOG) 0.1 % ointment, Apply topically 2 (two) times daily. for 14 days, Disp: 30 g, Rfl: 1    Current Facility-Administered Medications:     cyanocobalamin injection 1,000 mcg, 1,000 mcg, Intramuscular, Once, Blanca H. MD [facility]     Pt presents a list of blood pressure readings taken at home. Readings have been averaging 150/80.   Patient is asymptomatic      BP: (!) 150/80 during first bp check.     Blood pressure reading after 15 minutes was 150/80.  PCP notified.   PCP will see pt today to advise, pt scheduled

## 2020-12-22 NOTE — PROGRESS NOTES
Subjective:       Patient ID: Mina Valentine is a 80 y.o. male.    Chief Complaint: Follow-up (bp check )    In regards to the patient's hypertension, patient denies chest pain/sob, and reports compliance with medication regimen.    He has been on losartan for years. Never had problems with it. He is concerned it is no longer working for him. Very consistent and complient.    Home BP log shows BP ranging from 148-179 systolic and 60s-80s diastolic with occasional diastolic in the low 90s.     Depression Patient Health Questionnaire 5/18/2018 10/18/2016 3/31/2015   Over the last two weeks how often have you been bothered by little interest or pleasure in doing things 0 0 0   Over the last two weeks how often have you been bothered by feeling down, depressed or hopeless 0 0 0   PHQ-2 Total Score 0 0 0   Over the last two weeks how often have you been bothered by trouble falling or staying asleep, or sleeping too much - 0 0   Over the last two weeks how often have you been bothered by feeling tired or having little energy - 0 0   Over the last two weeks how often have you been bothered by a poor appetite or overeating - 0 0   Over the last two weeks how often have you been bothered by feeling bad about yourself - or that you are a failure or have let yourself or your family down - 0 0   Over the last two weeks how often have you been bothered by trouble concentrating on things, such as reading the newspaper or watching television - 0 0   Over the last two weeks how often have you been bothered by moving or speaking so slowly that other people could have noticed. Or the opposite - being so fidgety or restless that you have been moving around a lot more than usual. - 0 0   Over the last two weeks how often have you been bothered by thoughts that you would be better off dead, or of hurting yourself - 0 0   If you checked off any problems, how difficult have these problems made it for you to do your work, take care of  things at home or get along with other people? - Not difficult at all Not difficult at all   Total Score - 0 0     No flowsheet data found.    Review of Systems   Constitutional: Negative for unexpected weight change.   Respiratory: Negative for cough, chest tightness and shortness of breath.    Cardiovascular: Negative for chest pain, palpitations and leg swelling.   Neurological: Negative for light-headedness and headaches.   All other systems reviewed and are negative.        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 2009    CORONARY ARTERY BYPASS GRAFT      EYE SURGERY      cataract surgery    HERNIA REPAIR  2004    belly button    hydrocele  1960    PROSTATE SURGERY  2007    seed implant    UMBILICAL HERNIA REPAIR      x2     Family History   Problem Relation Age of Onset    Dementia Mother     Coronary artery disease Father 58         MI    Diabetes Father         type 1    Heart disease Father     Cancer Brother 89        brain cancer    Heart disease Sister         a fib    No Known Problems Daughter     No Known Problems Son     No Known Problems Sister     No Known Problems Daughter        Review of patient's allergies indicates:   Allergen Reactions    Penicillins Rash     Other reaction(s): Flushing (skin)       Current Outpatient  "Medications:     fexofenadine (ALLEGRA) 180 MG tablet, Take 1 tablet (180 mg total) by mouth once daily. For allergies, Disp: 90 tablet, Rfl: 3    latanoprost 0.005 % ophthalmic solution, , Disp: , Rfl:     levothyroxine (SYNTHROID) 75 MCG tablet, TAKE 1 TABLET EVERY DAY ( NEED  APPOINTMENT AND LAB ), Disp: 90 tablet, Rfl: 3    losartan (COZAAR) 100 MG tablet, Take 1 tablet (100 mg total) by mouth once daily., Disp: 90 tablet, Rfl: 3    omega 3-dha-epa-fish oil (FISH OIL) 120-180-500 mg Cap, , Disp: , Rfl:     pravastatin (PRAVACHOL) 40 MG tablet, Take 1 tablet (40 mg total) by mouth once daily., Disp: 90 tablet, Rfl: 3    hydroCHLOROthiazide (HYDRODIURIL) 12.5 MG Tab, Take 1 tablet (12.5 mg total) by mouth once daily. With your morning blood pressure pill., Disp: 90 tablet, Rfl: 3    ondansetron (ZOFRAN) 4 MG tablet, Take 1 tablet (4 mg total) by mouth every 6 (six) hours as needed., Disp: 30 tablet, Rfl: 0    triamcinolone acetonide 0.1% (KENALOG) 0.1 % ointment, Apply topically 2 (two) times daily. for 14 days, Disp: 30 g, Rfl: 1    Current Facility-Administered Medications:     cyanocobalamin injection 1,000 mcg, 1,000 mcg, Intramuscular, Once, Blanca H. MD Holden      Objective:      BP (!) 192/72 (BP Location: Right arm, Patient Position: Sitting, BP Method: Medium (Automatic))   Pulse 62   Resp 15   Ht 5' 8" (1.727 m)   Wt 75.6 kg (166 lb 10.7 oz)   SpO2 98%   BMI 25.34 kg/m²   Physical Exam  Vitals signs and nursing note reviewed.   Constitutional:       General: He is not in acute distress.     Appearance: Normal appearance. He is normal weight. He is not ill-appearing, toxic-appearing or diaphoretic.   HENT:      Head: Normocephalic and atraumatic.      Right Ear: External ear normal.      Left Ear: External ear normal.   Eyes:      General: No scleral icterus.        Right eye: No discharge.         Left eye: No discharge.      Extraocular Movements: Extraocular movements intact.      " Conjunctiva/sclera: Conjunctivae normal.      Pupils: Pupils are equal, round, and reactive to light.   Neck:      Musculoskeletal: Normal range of motion and neck supple. No neck rigidity or muscular tenderness.      Vascular: No carotid bruit.   Cardiovascular:      Rate and Rhythm: Normal rate and regular rhythm.      Pulses: Normal pulses.      Heart sounds: Normal heart sounds. No murmur. No friction rub. No gallop.    Pulmonary:      Effort: Pulmonary effort is normal. No respiratory distress.      Breath sounds: Normal breath sounds. No wheezing, rhonchi or rales.   Abdominal:      General: Abdomen is flat.      Palpations: Abdomen is soft.   Musculoskeletal: Normal range of motion.         General: No swelling or tenderness.      Right lower leg: No edema.      Left lower leg: No edema.   Lymphadenopathy:      Cervical: No cervical adenopathy.   Skin:     General: Skin is warm and dry.      Coloration: Skin is not jaundiced or pale.   Neurological:      General: No focal deficit present.      Mental Status: He is alert and oriented to person, place, and time. Mental status is at baseline.      Motor: No weakness.      Coordination: Coordination normal.      Gait: Gait normal.   Psychiatric:         Mood and Affect: Mood normal.         Behavior: Behavior normal.         Thought Content: Thought content normal.         Judgment: Judgment normal.         Assessment:       1. Essential hypertension    2. Uncontrolled hypertension        Plan:       Essential hypertension  -     hydroCHLOROthiazide (HYDRODIURIL) 12.5 MG Tab; Take 1 tablet (12.5 mg total) by mouth once daily. With your morning blood pressure pill.  Dispense: 90 tablet; Refill: 3    Uncontrolled hypertension  -     hydroCHLOROthiazide (HYDRODIURIL) 12.5 MG Tab; Take 1 tablet (12.5 mg total) by mouth once daily. With your morning blood pressure pill.  Dispense: 90 tablet; Refill: 3    Monitor BID and send home BP readins via portal. F/u 2 weeks for  NV then in 4 weeks for OV with me.      Strict return precautions reviewed and patient verbalized understanding. Risks, benefits, and alternatives to the plan were reviewed in detail and all questions answered to the patient's satisfaction. 25 minutes were spent with patient, >50% of time based on counseling and coordination of care.    No follow-ups on file.

## 2020-12-23 ENCOUNTER — TELEPHONE (OUTPATIENT)
Dept: FAMILY MEDICINE | Facility: CLINIC | Age: 80
End: 2020-12-23

## 2020-12-23 ENCOUNTER — PATIENT MESSAGE (OUTPATIENT)
Dept: FAMILY MEDICINE | Facility: CLINIC | Age: 80
End: 2020-12-23

## 2020-12-23 RX ORDER — HYDROCHLOROTHIAZIDE 25 MG/1
12.5 TABLET ORAL DAILY
Qty: 15 TABLET | Refills: 0 | Status: SHIPPED | OUTPATIENT
Start: 2020-12-23 | End: 2021-01-04 | Stop reason: SDUPTHER

## 2020-12-23 NOTE — TELEPHONE ENCOUNTER
Pt presented to clinic yesterday for a nurse visit bp check. Pt bp was elevated during nurse visit. PCP notified and pt was scheduled for a same day office visit. PCP made adjustments to pt medicaton regimen due to elevated bp. Pt medication sent to pt preferred pharmacy [facility].    Pt reported elevated bp via [employer] today. PCP notified. PCP sent rx for hydrochlorothiazide to pt preferred local pharmacy to address elevated bp. PCP sent hydrochlorothiazide 25 mg tablet instead of 12.5 because it is cheaper. Pt notified to take only half of the hydrochlorothiazide 25mg tablet. Pt expresses understanding.

## 2020-12-28 ENCOUNTER — PATIENT MESSAGE (OUTPATIENT)
Dept: SURGERY | Facility: CLINIC | Age: 80
End: 2020-12-28

## 2020-12-28 NOTE — TELEPHONE ENCOUNTER
Writer spoke to patient and let him know to start off with his exercising slowly and gradually increase his amount of planks and squats weekly. Patient expressed verbal understanding.

## 2020-12-29 ENCOUNTER — PATIENT OUTREACH (OUTPATIENT)
Dept: OTHER | Facility: OTHER | Age: 80
End: 2020-12-29

## 2020-12-29 NOTE — PROGRESS NOTES
"Digital Medicine: Health  Introduction    Introduced Mina Valentine to Digital Medicine. Discussed health  role and recommended lifestyle modifications.    The history is provided by the patient.           Additional Enrollment Details: Patient was recently started on HCTZ. He reports starting this 3 days ago. He was also recently told to split the dosage of his losartan, which he now takes 1/2 in the morning and half in the evening.    We reviewed technique in detail. Patient states that reading this morning was taken prior to medication. He will take readings 1+ hour after medication and coffee going forward. I will send video of proper cuff placement.     He states he thinks he does "excellent" with staying active and following a healthy diet. Reviewed details of digital coaching and explained automated text messages.    Patient expresses appreciation for program and states he wants his blood pressure to be lower.     HYPERTENSION  Explained hypertension digital medicine goals including BP goal less than or equal to 130/80mmHg, improved convenience of BP management and reduced risk of heart attack, kidney failure, stroke, eye disease, dementia, and death.      Explained non-pharmacologic therapies like low salt diet and physical activity can reduce blood pressure. .      Explained that we expect patient to submit several blood pressure readings per week at random times of the day, but at least 30 minutes after taking blood pressure medications. Instructed patient not to allow anyone else to use their blood pressure monitor and phone as data submitted is directly entered into medical record. Reviewed and confirmed appropriate blood pressure monitoring technique.         Patient's BP goal is less than or equal to 130/80.Patient's BP average is 177/72 mmHg, which is above goal, per 2017 ACC/AHA Hypertension Guidelines.    Explained to the patient that the Digital Medicine team is not available for " emergencies. Advised patient call [facility] ([phone] or [phone]) or [phone] if needed.                 Diet-Not assessed          Physical Activity-Assessed      Additional physical activity details: He had surgery for hernia repair last month so he has been holding off on exercise but previously did planks and squats. He walks often as well.       Medication Adherence-Medication adherence was assessed.  Patient continue taking medication as prescribed.        Substance, Sleep, Stress-Not assessed      Additional monitoring needed.  Continue current diet/physical activity routine.  Provided patient education.  Reviewed Device Techniques.     Addressed patient questions and patient has my contact information if needed prior to next outreach. Patient verbalizes understanding.      Explained the importance of self-monitoring and medication adherence. Encouraged the patient to communicate with their health  for lifestyle modifications to help improve or maintain a healthy lifestyle.        Sent link to [facility] webpages and my contact information via [employer] for future questions.        Explained to the patient that the Digital Medicine team is not available for emergencies. Advised patient call [facility] ([phone] or [phone]) or [phone] if needed.            There are no preventive care reminders to display for this patient.      Last 5 Patient Entered Readings                                      Current 30 Day Average: 177/72     Recent Readings 12/29/2020 12/29/2020 12/28/2020 12/28/2020 12/26/2020    SBP (mmHg) 170 166 177 177 183    DBP (mmHg) 70 79 72 65 73    Pulse 62 63 77 76 66

## 2020-12-29 NOTE — LETTER
December 29, 2020     Mina Valentine  1954 [address] [facility]  [address]  [place] [address]       Dear Mina,    Welcome to [facility]! Our goal is to make care effective, proactive and convenient by using data you send us from home to better treat your chronic conditions.                My name is Sana Mcdaniel, and I am your dedicated Digital Medicine clinician. As an expert in medication management, I will help ensure that the medications you are taking continue to provide the intended benefits and help you reach your goals. You can reach me directly at [phone] or by sending me a message directly through your [facility] account.      I am Nola Her and I will be your health . My job is to help you identify lifestyle changes to improve your disease control. We will talk about nutrition, exercise, and other ways you may be able to adjust your current habits to better your health. Additionally, we will help ensure you are completing the tests and screenings that are necessary to help manage your conditions. You can reach me directly at [phone] or by sending me a message directly through your [facility] account.    Most importantly, YOU are at the center of this team. Together, we will work to improve your overall health and encourage you to meet your goals for a healthier lifestyle.     What we expect from YOU:  · Please take frequent home blood pressure measurements. We ask that you take at least 1 blood pressure reading per week, but more information will better help us get you know you. Be sure you rest for a few minutes before taking the reading in a quiet, comfortable place.     Be available to receive phone calls or [employer] messages, when appropriate, from your care team. Please let us know if there are any specific days or times that work best for us to reach you via phone.     Complete routine tests and screenings. Dont worry, we will help keep you on track!            What you should expect from your Digital Medicine Care Team:   We will work with you to create a personalized plan of care and provide you with encouragement and education, including regarding lifestyle changes, that could help you manage your disease states.     We will adjust your current medications, if needed, and continue to monitor your long-term progress.     We will provide you and your physician with monthly progress reports after you have been in the program for more than 30 days.     We will send you reminders through [employer] and text messages to help ensure you do not miss any testing deadlines to help manage your disease states.    You will be able to reach us by phone or through your [facility] account by clicking our names under Care Team on the right side of the home screen.    We look forward to working with you to help manage your health,    Sincerely,    Your Digital Medicine Team    Please visit our websites to learn more:   · Hypertension: [email or web address]      Remember, we are not available for emergencies. If you have an emergency, please contact your doctors office directly or call [facility] on-call ([phone] or [phone]) or [phone].

## 2020-12-30 ENCOUNTER — PATIENT MESSAGE (OUTPATIENT)
Dept: ADMINISTRATIVE | Facility: OTHER | Age: 80
End: 2020-12-30

## 2020-12-31 ENCOUNTER — PATIENT OUTREACH (OUTPATIENT)
Dept: OTHER | Facility: OTHER | Age: 80
End: 2020-12-31

## 2020-12-31 DIAGNOSIS — I10 ESSENTIAL HYPERTENSION: Primary | ICD-10-CM

## 2021-01-04 RX ORDER — VALSARTAN 160 MG/1
160 TABLET ORAL EVERY MORNING
Qty: 90 TABLET | Refills: 1 | Status: SHIPPED | OUTPATIENT
Start: 2021-01-04 | End: 2021-01-11 | Stop reason: SDUPTHER

## 2021-01-11 ENCOUNTER — NURSE TRIAGE (OUTPATIENT)
Dept: ADMINISTRATIVE | Facility: CLINIC | Age: 81
End: 2021-01-11

## 2021-01-11 ENCOUNTER — OFFICE VISIT (OUTPATIENT)
Dept: FAMILY MEDICINE | Facility: CLINIC | Age: 81
End: 2021-01-11
Payer: MEDICARE

## 2021-01-11 VITALS
BODY MASS INDEX: 24.06 KG/M2 | TEMPERATURE: 98 F | DIASTOLIC BLOOD PRESSURE: 75 MMHG | RESPIRATION RATE: 16 BRPM | HEIGHT: 68 IN | WEIGHT: 158.75 LBS | SYSTOLIC BLOOD PRESSURE: 160 MMHG | HEART RATE: 70 BPM | OXYGEN SATURATION: 99 %

## 2021-01-11 DIAGNOSIS — I10 ESSENTIAL HYPERTENSION: ICD-10-CM

## 2021-01-11 PROCEDURE — 1101F PR PT FALLS ASSESS DOC 0-1 FALLS W/OUT INJ PAST YR: ICD-10-PCS | Mod: CPTII,S$GLB,, | Performed by: FAMILY MEDICINE

## 2021-01-11 PROCEDURE — 99999 PR PBB SHADOW E&M-EST. PATIENT-LVL IV: CPT | Mod: PBBFAC,,, | Performed by: FAMILY MEDICINE

## 2021-01-11 PROCEDURE — 99215 OFFICE O/P EST HI 40 MIN: CPT | Mod: S$GLB,,, | Performed by: FAMILY MEDICINE

## 2021-01-11 PROCEDURE — 1101F PT FALLS ASSESS-DOCD LE1/YR: CPT | Mod: CPTII,S$GLB,, | Performed by: FAMILY MEDICINE

## 2021-01-11 PROCEDURE — 3288F FALL RISK ASSESSMENT DOCD: CPT | Mod: CPTII,S$GLB,, | Performed by: FAMILY MEDICINE

## 2021-01-11 PROCEDURE — 1159F MED LIST DOCD IN RCRD: CPT | Mod: S$GLB,,, | Performed by: FAMILY MEDICINE

## 2021-01-11 PROCEDURE — 3288F PR FALLS RISK ASSESSMENT DOCUMENTED: ICD-10-PCS | Mod: CPTII,S$GLB,, | Performed by: FAMILY MEDICINE

## 2021-01-11 PROCEDURE — 1126F AMNT PAIN NOTED NONE PRSNT: CPT | Mod: S$GLB,,, | Performed by: FAMILY MEDICINE

## 2021-01-11 PROCEDURE — 99999 PR PBB SHADOW E&M-EST. PATIENT-LVL IV: ICD-10-PCS | Mod: PBBFAC,,, | Performed by: FAMILY MEDICINE

## 2021-01-11 PROCEDURE — 1157F ADVNC CARE PLAN IN RCRD: CPT | Mod: S$GLB,,, | Performed by: FAMILY MEDICINE

## 2021-01-11 PROCEDURE — 3078F PR MOST RECENT DIASTOLIC BLOOD PRESSURE < 80 MM HG: ICD-10-PCS | Mod: CPTII,S$GLB,, | Performed by: FAMILY MEDICINE

## 2021-01-11 PROCEDURE — 1159F PR MEDICATION LIST DOCUMENTED IN MEDICAL RECORD: ICD-10-PCS | Mod: S$GLB,,, | Performed by: FAMILY MEDICINE

## 2021-01-11 PROCEDURE — 1126F PR PAIN SEVERITY QUANTIFIED, NO PAIN PRESENT: ICD-10-PCS | Mod: S$GLB,,, | Performed by: FAMILY MEDICINE

## 2021-01-11 PROCEDURE — 3077F PR MOST RECENT SYSTOLIC BLOOD PRESSURE >= 140 MM HG: ICD-10-PCS | Mod: CPTII,S$GLB,, | Performed by: FAMILY MEDICINE

## 2021-01-11 PROCEDURE — 3077F SYST BP >= 140 MM HG: CPT | Mod: CPTII,S$GLB,, | Performed by: FAMILY MEDICINE

## 2021-01-11 PROCEDURE — 99215 PR OFFICE/OUTPT VISIT, EST, LEVL V, 40-54 MIN: ICD-10-PCS | Mod: S$GLB,,, | Performed by: FAMILY MEDICINE

## 2021-01-11 PROCEDURE — 1157F PR ADVANCE CARE PLAN OR EQUIV PRESENT IN MEDICAL RECORD: ICD-10-PCS | Mod: S$GLB,,, | Performed by: FAMILY MEDICINE

## 2021-01-11 PROCEDURE — 3078F DIAST BP <80 MM HG: CPT | Mod: CPTII,S$GLB,, | Performed by: FAMILY MEDICINE

## 2021-01-11 RX ORDER — LOSARTAN POTASSIUM 100 MG/1
100 TABLET ORAL DAILY
COMMUNITY
End: 2021-01-12

## 2021-01-11 RX ORDER — VALSARTAN 160 MG/1
160 TABLET ORAL DAILY
Qty: 30 TABLET | Refills: 0 | Status: SHIPPED | OUTPATIENT
Start: 2021-01-11 | End: 2021-01-19

## 2021-01-11 RX ORDER — CLONIDINE HYDROCHLORIDE 0.1 MG/1
0.1 TABLET ORAL EVERY 4 HOURS PRN
Qty: 30 TABLET | Refills: 0 | Status: SHIPPED | OUTPATIENT
Start: 2021-01-11 | End: 2023-07-26 | Stop reason: SDUPTHER

## 2021-01-12 ENCOUNTER — TELEPHONE (OUTPATIENT)
Dept: FAMILY MEDICINE | Facility: CLINIC | Age: 81
End: 2021-01-12

## 2021-01-13 ENCOUNTER — PATIENT MESSAGE (OUTPATIENT)
Dept: FAMILY MEDICINE | Facility: CLINIC | Age: 81
End: 2021-01-13

## 2021-01-19 ENCOUNTER — OFFICE VISIT (OUTPATIENT)
Dept: FAMILY MEDICINE | Facility: CLINIC | Age: 81
End: 2021-01-19
Payer: MEDICARE

## 2021-01-19 VITALS
HEART RATE: 82 BPM | RESPIRATION RATE: 15 BRPM | WEIGHT: 170.75 LBS | BODY MASS INDEX: 25.88 KG/M2 | SYSTOLIC BLOOD PRESSURE: 188 MMHG | DIASTOLIC BLOOD PRESSURE: 80 MMHG | OXYGEN SATURATION: 99 % | HEIGHT: 68 IN

## 2021-01-19 DIAGNOSIS — I10 ESSENTIAL HYPERTENSION: ICD-10-CM

## 2021-01-19 DIAGNOSIS — E78.2 MIXED HYPERLIPIDEMIA: Primary | ICD-10-CM

## 2021-01-19 PROCEDURE — 3079F DIAST BP 80-89 MM HG: CPT | Mod: CPTII,S$GLB,, | Performed by: FAMILY MEDICINE

## 2021-01-19 PROCEDURE — 1101F PT FALLS ASSESS-DOCD LE1/YR: CPT | Mod: CPTII,S$GLB,, | Performed by: FAMILY MEDICINE

## 2021-01-19 PROCEDURE — 1157F PR ADVANCE CARE PLAN OR EQUIV PRESENT IN MEDICAL RECORD: ICD-10-PCS | Mod: S$GLB,,, | Performed by: FAMILY MEDICINE

## 2021-01-19 PROCEDURE — 1159F MED LIST DOCD IN RCRD: CPT | Mod: S$GLB,,, | Performed by: FAMILY MEDICINE

## 2021-01-19 PROCEDURE — 3077F PR MOST RECENT SYSTOLIC BLOOD PRESSURE >= 140 MM HG: ICD-10-PCS | Mod: CPTII,S$GLB,, | Performed by: FAMILY MEDICINE

## 2021-01-19 PROCEDURE — 1157F ADVNC CARE PLAN IN RCRD: CPT | Mod: S$GLB,,, | Performed by: FAMILY MEDICINE

## 2021-01-19 PROCEDURE — 1101F PR PT FALLS ASSESS DOC 0-1 FALLS W/OUT INJ PAST YR: ICD-10-PCS | Mod: CPTII,S$GLB,, | Performed by: FAMILY MEDICINE

## 2021-01-19 PROCEDURE — 99214 PR OFFICE/OUTPT VISIT, EST, LEVL IV, 30-39 MIN: ICD-10-PCS | Mod: S$GLB,,, | Performed by: FAMILY MEDICINE

## 2021-01-19 PROCEDURE — 3077F SYST BP >= 140 MM HG: CPT | Mod: CPTII,S$GLB,, | Performed by: FAMILY MEDICINE

## 2021-01-19 PROCEDURE — 3288F FALL RISK ASSESSMENT DOCD: CPT | Mod: CPTII,S$GLB,, | Performed by: FAMILY MEDICINE

## 2021-01-19 PROCEDURE — 1126F PR PAIN SEVERITY QUANTIFIED, NO PAIN PRESENT: ICD-10-PCS | Mod: S$GLB,,, | Performed by: FAMILY MEDICINE

## 2021-01-19 PROCEDURE — 3079F PR MOST RECENT DIASTOLIC BLOOD PRESSURE 80-89 MM HG: ICD-10-PCS | Mod: CPTII,S$GLB,, | Performed by: FAMILY MEDICINE

## 2021-01-19 PROCEDURE — 1126F AMNT PAIN NOTED NONE PRSNT: CPT | Mod: S$GLB,,, | Performed by: FAMILY MEDICINE

## 2021-01-19 PROCEDURE — 99999 PR PBB SHADOW E&M-EST. PATIENT-LVL V: ICD-10-PCS | Mod: PBBFAC,,, | Performed by: FAMILY MEDICINE

## 2021-01-19 PROCEDURE — 99214 OFFICE O/P EST MOD 30 MIN: CPT | Mod: S$GLB,,, | Performed by: FAMILY MEDICINE

## 2021-01-19 PROCEDURE — 3288F PR FALLS RISK ASSESSMENT DOCUMENTED: ICD-10-PCS | Mod: CPTII,S$GLB,, | Performed by: FAMILY MEDICINE

## 2021-01-19 PROCEDURE — 99999 PR PBB SHADOW E&M-EST. PATIENT-LVL V: CPT | Mod: PBBFAC,,, | Performed by: FAMILY MEDICINE

## 2021-01-19 PROCEDURE — 1159F PR MEDICATION LIST DOCUMENTED IN MEDICAL RECORD: ICD-10-PCS | Mod: S$GLB,,, | Performed by: FAMILY MEDICINE

## 2021-01-19 RX ORDER — VALSARTAN 160 MG/1
160 TABLET ORAL 2 TIMES DAILY
Qty: 180 TABLET | Refills: 3 | Status: SHIPPED | OUTPATIENT
Start: 2021-01-19 | End: 2021-06-17 | Stop reason: ALTCHOICE

## 2021-01-24 ENCOUNTER — HOSPITAL ENCOUNTER (EMERGENCY)
Facility: HOSPITAL | Age: 81
Discharge: HOME | End: 2021-01-24
Attending: FAMILY MEDICINE
Payer: MEDICARE

## 2021-01-24 VITALS
OXYGEN SATURATION: 99 % | WEIGHT: 170 LBS | TEMPERATURE: 98 F | HEART RATE: 80 BPM | BODY MASS INDEX: 25.76 KG/M2 | HEIGHT: 68 IN | DIASTOLIC BLOOD PRESSURE: 72 MMHG | RESPIRATION RATE: 18 BRPM | SYSTOLIC BLOOD PRESSURE: 187 MMHG

## 2021-01-24 DIAGNOSIS — I10 HYPERTENSION, UNSPECIFIED TYPE: Primary | ICD-10-CM

## 2021-01-24 PROCEDURE — 99282 EMERGENCY DEPT VISIT SF MDM: CPT

## 2021-02-02 ENCOUNTER — PATIENT MESSAGE (OUTPATIENT)
Dept: FAMILY MEDICINE | Facility: CLINIC | Age: 81
End: 2021-02-02

## 2021-02-02 ENCOUNTER — TELEPHONE (OUTPATIENT)
Dept: FAMILY MEDICINE | Facility: CLINIC | Age: 81
End: 2021-02-02

## 2021-04-26 ENCOUNTER — PATIENT MESSAGE (OUTPATIENT)
Dept: FAMILY MEDICINE | Facility: CLINIC | Age: 81
End: 2021-04-26

## 2021-04-30 PROCEDURE — 99457 RPM TX MGMT 1ST 20 MIN: CPT | Mod: S$GLB,,, | Performed by: FAMILY MEDICINE

## 2021-04-30 PROCEDURE — 99457 PR MONITORING, PHYSIOL PARAM, REMOTE, 1ST 20 MINS, PER MONTH: ICD-10-PCS | Mod: S$GLB,,, | Performed by: FAMILY MEDICINE

## 2021-06-19 ENCOUNTER — PATIENT MESSAGE (OUTPATIENT)
Dept: FAMILY MEDICINE | Facility: CLINIC | Age: 81
End: 2021-06-19

## 2021-06-19 DIAGNOSIS — E78.2 MIXED HYPERLIPIDEMIA: ICD-10-CM

## 2021-06-19 DIAGNOSIS — N18.30 STAGE 3 CHRONIC KIDNEY DISEASE, UNSPECIFIED WHETHER STAGE 3A OR 3B CKD: Primary | ICD-10-CM

## 2021-06-19 DIAGNOSIS — I10 ESSENTIAL HYPERTENSION: ICD-10-CM

## 2021-06-19 DIAGNOSIS — R79.9 ABNORMAL FINDING OF BLOOD CHEMISTRY, UNSPECIFIED: ICD-10-CM

## 2021-06-19 DIAGNOSIS — I10 UNCONTROLLED HYPERTENSION: ICD-10-CM

## 2021-06-23 ENCOUNTER — LAB VISIT (OUTPATIENT)
Dept: FAMILY MEDICINE | Facility: CLINIC | Age: 81
End: 2021-06-23
Payer: MEDICARE

## 2021-06-23 DIAGNOSIS — E78.2 MIXED HYPERLIPIDEMIA: ICD-10-CM

## 2021-06-23 DIAGNOSIS — N18.30 STAGE 3 CHRONIC KIDNEY DISEASE, UNSPECIFIED WHETHER STAGE 3A OR 3B CKD: ICD-10-CM

## 2021-06-23 DIAGNOSIS — R79.9 ABNORMAL FINDING OF BLOOD CHEMISTRY, UNSPECIFIED: ICD-10-CM

## 2021-06-23 DIAGNOSIS — I10 ESSENTIAL HYPERTENSION: ICD-10-CM

## 2021-06-23 LAB
ALBUMIN SERPL BCP-MCNC: 4 G/DL (ref 3.5–5.2)
ALP SERPL-CCNC: 63 U/L (ref 55–135)
ALT SERPL W/O P-5'-P-CCNC: 25 U/L (ref 10–44)
ANION GAP SERPL CALC-SCNC: 10 MMOL/L (ref 8–16)
AST SERPL-CCNC: 18 U/L (ref 10–40)
BASOPHILS # BLD AUTO: 0.04 K/UL (ref 0–0.2)
BASOPHILS NFR BLD: 0.5 % (ref 0–1.9)
BILIRUB SERPL-MCNC: 1.1 MG/DL (ref 0.1–1)
BUN SERPL-MCNC: 18 MG/DL (ref 8–23)
CALCIUM SERPL-MCNC: 10.3 MG/DL (ref 8.7–10.5)
CHLORIDE SERPL-SCNC: 107 MMOL/L (ref 95–110)
CHOLEST SERPL-MCNC: 195 MG/DL (ref 120–199)
CHOLEST/HDLC SERPL: 3.6 {RATIO} (ref 2–5)
CO2 SERPL-SCNC: 23 MMOL/L (ref 23–29)
CREAT SERPL-MCNC: 1.5 MG/DL (ref 0.5–1.4)
CRP SERPL-MCNC: 1 MG/L (ref 0–8.2)
DIFFERENTIAL METHOD: ABNORMAL
EOSINOPHIL # BLD AUTO: 0.2 K/UL (ref 0–0.5)
EOSINOPHIL NFR BLD: 2.2 % (ref 0–8)
ERYTHROCYTE [DISTWIDTH] IN BLOOD BY AUTOMATED COUNT: 12.2 % (ref 11.5–14.5)
ERYTHROCYTE [SEDIMENTATION RATE] IN BLOOD BY WESTERGREN METHOD: 24 MM/HR (ref 0–10)
EST. GFR  (AFRICAN AMERICAN): 50.1 ML/MIN/1.73 M^2
EST. GFR  (NON AFRICAN AMERICAN): 43.3 ML/MIN/1.73 M^2
ESTIMATED AVG GLUCOSE: 105 MG/DL (ref 68–131)
GLUCOSE SERPL-MCNC: 100 MG/DL (ref 70–110)
HBA1C MFR BLD: 5.3 % (ref 4–5.6)
HCT VFR BLD AUTO: 41.1 % (ref 40–54)
HDLC SERPL-MCNC: 54 MG/DL (ref 40–75)
HDLC SERPL: 27.7 % (ref 20–50)
HGB BLD-MCNC: 13.7 G/DL (ref 14–18)
IMM GRANULOCYTES # BLD AUTO: 0.01 K/UL (ref 0–0.04)
IMM GRANULOCYTES NFR BLD AUTO: 0.1 % (ref 0–0.5)
LDLC SERPL CALC-MCNC: 115 MG/DL (ref 63–159)
LYMPHOCYTES # BLD AUTO: 2.7 K/UL (ref 1–4.8)
LYMPHOCYTES NFR BLD: 35.4 % (ref 18–48)
MCH RBC QN AUTO: 31.1 PG (ref 27–31)
MCHC RBC AUTO-ENTMCNC: 33.3 G/DL (ref 32–36)
MCV RBC AUTO: 93 FL (ref 82–98)
MONOCYTES # BLD AUTO: 0.9 K/UL (ref 0.3–1)
MONOCYTES NFR BLD: 11.3 % (ref 4–15)
NEUTROPHILS # BLD AUTO: 3.9 K/UL (ref 1.8–7.7)
NEUTROPHILS NFR BLD: 50.5 % (ref 38–73)
NONHDLC SERPL-MCNC: 141 MG/DL
NRBC BLD-RTO: 0 /100 WBC
PLATELET # BLD AUTO: 270 K/UL (ref 150–450)
PMV BLD AUTO: 10.3 FL (ref 9.2–12.9)
POTASSIUM SERPL-SCNC: 4.4 MMOL/L (ref 3.5–5.1)
PROT SERPL-MCNC: 7.8 G/DL (ref 6–8.4)
RBC # BLD AUTO: 4.4 M/UL (ref 4.6–6.2)
SODIUM SERPL-SCNC: 140 MMOL/L (ref 136–145)
TRIGL SERPL-MCNC: 130 MG/DL (ref 30–150)
URATE SERPL-MCNC: 6.8 MG/DL (ref 3.4–7)
WBC # BLD AUTO: 7.69 K/UL (ref 3.9–12.7)

## 2021-06-23 PROCEDURE — 80053 COMPREHEN METABOLIC PANEL: CPT | Performed by: FAMILY MEDICINE

## 2021-06-23 PROCEDURE — 86140 C-REACTIVE PROTEIN: CPT | Performed by: FAMILY MEDICINE

## 2021-06-23 PROCEDURE — 83036 HEMOGLOBIN GLYCOSYLATED A1C: CPT | Performed by: FAMILY MEDICINE

## 2021-06-23 PROCEDURE — 84550 ASSAY OF BLOOD/URIC ACID: CPT | Performed by: FAMILY MEDICINE

## 2021-06-23 PROCEDURE — 86431 RHEUMATOID FACTOR QUANT: CPT | Performed by: FAMILY MEDICINE

## 2021-06-23 PROCEDURE — 82306 VITAMIN D 25 HYDROXY: CPT | Performed by: FAMILY MEDICINE

## 2021-06-23 PROCEDURE — 85651 RBC SED RATE NONAUTOMATED: CPT | Performed by: FAMILY MEDICINE

## 2021-06-23 PROCEDURE — 82570 ASSAY OF URINE CREATININE: CPT | Performed by: FAMILY MEDICINE

## 2021-06-23 PROCEDURE — 86038 ANTINUCLEAR ANTIBODIES: CPT | Performed by: FAMILY MEDICINE

## 2021-06-23 PROCEDURE — 82043 UR ALBUMIN QUANTITATIVE: CPT | Performed by: FAMILY MEDICINE

## 2021-06-23 PROCEDURE — 80061 LIPID PANEL: CPT | Performed by: FAMILY MEDICINE

## 2021-06-23 PROCEDURE — 85025 COMPLETE CBC W/AUTO DIFF WBC: CPT | Performed by: FAMILY MEDICINE

## 2021-06-24 LAB
25(OH)D3+25(OH)D2 SERPL-MCNC: 70 NG/ML (ref 30–96)
ALBUMIN/CREAT UR: 11.3 UG/MG (ref 0–30)
ANA SER QL IF: NORMAL
CREAT UR-MCNC: 231 MG/DL (ref 23–375)
MICROALBUMIN UR DL<=1MG/L-MCNC: 26 UG/ML
RHEUMATOID FACT SERPL-ACNC: <10 IU/ML (ref 0–15)

## 2021-06-28 ENCOUNTER — OFFICE VISIT (OUTPATIENT)
Dept: FAMILY MEDICINE | Facility: CLINIC | Age: 81
End: 2021-06-28
Payer: MEDICARE

## 2021-06-28 VITALS
OXYGEN SATURATION: 98 % | HEART RATE: 75 BPM | DIASTOLIC BLOOD PRESSURE: 64 MMHG | BODY MASS INDEX: 25.07 KG/M2 | HEIGHT: 68 IN | TEMPERATURE: 98 F | SYSTOLIC BLOOD PRESSURE: 130 MMHG | WEIGHT: 165.44 LBS

## 2021-06-28 DIAGNOSIS — N18.32 STAGE 3B CHRONIC KIDNEY DISEASE: Primary | ICD-10-CM

## 2021-06-28 DIAGNOSIS — I10 ESSENTIAL HYPERTENSION: ICD-10-CM

## 2021-06-28 PROCEDURE — 3288F PR FALLS RISK ASSESSMENT DOCUMENTED: ICD-10-PCS | Mod: CPTII,S$GLB,, | Performed by: FAMILY MEDICINE

## 2021-06-28 PROCEDURE — 3075F SYST BP GE 130 - 139MM HG: CPT | Mod: CPTII,S$GLB,, | Performed by: FAMILY MEDICINE

## 2021-06-28 PROCEDURE — 1101F PR PT FALLS ASSESS DOC 0-1 FALLS W/OUT INJ PAST YR: ICD-10-PCS | Mod: CPTII,S$GLB,, | Performed by: FAMILY MEDICINE

## 2021-06-28 PROCEDURE — 99215 PR OFFICE/OUTPT VISIT, EST, LEVL V, 40-54 MIN: ICD-10-PCS | Mod: S$GLB,,, | Performed by: FAMILY MEDICINE

## 2021-06-28 PROCEDURE — 3288F FALL RISK ASSESSMENT DOCD: CPT | Mod: CPTII,S$GLB,, | Performed by: FAMILY MEDICINE

## 2021-06-28 PROCEDURE — 1159F MED LIST DOCD IN RCRD: CPT | Mod: CPTII,S$GLB,, | Performed by: FAMILY MEDICINE

## 2021-06-28 PROCEDURE — 3078F PR MOST RECENT DIASTOLIC BLOOD PRESSURE < 80 MM HG: ICD-10-PCS | Mod: CPTII,S$GLB,, | Performed by: FAMILY MEDICINE

## 2021-06-28 PROCEDURE — 1126F PR PAIN SEVERITY QUANTIFIED, NO PAIN PRESENT: ICD-10-PCS | Mod: CPTII,S$GLB,, | Performed by: FAMILY MEDICINE

## 2021-06-28 PROCEDURE — 1126F AMNT PAIN NOTED NONE PRSNT: CPT | Mod: CPTII,S$GLB,, | Performed by: FAMILY MEDICINE

## 2021-06-28 PROCEDURE — 1157F ADVNC CARE PLAN IN RCRD: CPT | Mod: CPTII,S$GLB,, | Performed by: FAMILY MEDICINE

## 2021-06-28 PROCEDURE — 1159F PR MEDICATION LIST DOCUMENTED IN MEDICAL RECORD: ICD-10-PCS | Mod: CPTII,S$GLB,, | Performed by: FAMILY MEDICINE

## 2021-06-28 PROCEDURE — 1157F PR ADVANCE CARE PLAN OR EQUIV PRESENT IN MEDICAL RECORD: ICD-10-PCS | Mod: CPTII,S$GLB,, | Performed by: FAMILY MEDICINE

## 2021-06-28 PROCEDURE — 1101F PT FALLS ASSESS-DOCD LE1/YR: CPT | Mod: CPTII,S$GLB,, | Performed by: FAMILY MEDICINE

## 2021-06-28 PROCEDURE — 3078F DIAST BP <80 MM HG: CPT | Mod: CPTII,S$GLB,, | Performed by: FAMILY MEDICINE

## 2021-06-28 PROCEDURE — 99215 OFFICE O/P EST HI 40 MIN: CPT | Mod: S$GLB,,, | Performed by: FAMILY MEDICINE

## 2021-06-28 PROCEDURE — 99999 PR PBB SHADOW E&M-EST. PATIENT-LVL V: CPT | Mod: PBBFAC,,, | Performed by: FAMILY MEDICINE

## 2021-06-28 PROCEDURE — 3075F PR MOST RECENT SYSTOLIC BLOOD PRESS GE 130-139MM HG: ICD-10-PCS | Mod: CPTII,S$GLB,, | Performed by: FAMILY MEDICINE

## 2021-06-28 PROCEDURE — 99999 PR PBB SHADOW E&M-EST. PATIENT-LVL V: ICD-10-PCS | Mod: PBBFAC,,, | Performed by: FAMILY MEDICINE

## 2021-06-29 ENCOUNTER — HOSPITAL ENCOUNTER (OUTPATIENT)
Dept: RADIOLOGY | Facility: HOSPITAL | Age: 81
Discharge: HOME | End: 2021-06-29
Attending: FAMILY MEDICINE
Payer: MEDICARE

## 2021-06-29 DIAGNOSIS — N18.32 STAGE 3B CHRONIC KIDNEY DISEASE: ICD-10-CM

## 2021-06-29 DIAGNOSIS — I10 ESSENTIAL HYPERTENSION: ICD-10-CM

## 2021-06-29 PROCEDURE — 93975 VASCULAR STUDY: CPT | Mod: 26,,, | Performed by: RADIOLOGY

## 2021-06-29 PROCEDURE — 93975 VASCULAR STUDY: CPT | Mod: TC,PN

## 2021-06-29 PROCEDURE — 93975 US RENAL ARTERY STENOSIS HYPERTEN (XPD): ICD-10-PCS | Mod: 26,,, | Performed by: RADIOLOGY

## 2021-07-01 ENCOUNTER — PATIENT MESSAGE (OUTPATIENT)
Dept: FAMILY MEDICINE | Facility: CLINIC | Age: 81
End: 2021-07-01

## 2021-07-26 ENCOUNTER — OFFICE VISIT (OUTPATIENT)
Dept: FAMILY MEDICINE | Facility: CLINIC | Age: 81
End: 2021-07-26
Payer: MEDICARE

## 2021-07-26 VITALS
OXYGEN SATURATION: 98 % | HEIGHT: 68 IN | WEIGHT: 167 LBS | TEMPERATURE: 97 F | SYSTOLIC BLOOD PRESSURE: 166 MMHG | DIASTOLIC BLOOD PRESSURE: 65 MMHG | HEART RATE: 73 BPM | BODY MASS INDEX: 25.31 KG/M2

## 2021-07-26 DIAGNOSIS — N18.32 STAGE 3B CHRONIC KIDNEY DISEASE: ICD-10-CM

## 2021-07-26 DIAGNOSIS — I10 ESSENTIAL HYPERTENSION: Primary | ICD-10-CM

## 2021-07-26 PROCEDURE — 3077F PR MOST RECENT SYSTOLIC BLOOD PRESSURE >= 140 MM HG: ICD-10-PCS | Mod: CPTII,S$GLB,, | Performed by: FAMILY MEDICINE

## 2021-07-26 PROCEDURE — 1101F PR PT FALLS ASSESS DOC 0-1 FALLS W/OUT INJ PAST YR: ICD-10-PCS | Mod: CPTII,S$GLB,, | Performed by: FAMILY MEDICINE

## 2021-07-26 PROCEDURE — 99214 OFFICE O/P EST MOD 30 MIN: CPT | Mod: S$GLB,,, | Performed by: FAMILY MEDICINE

## 2021-07-26 PROCEDURE — 1160F PR REVIEW ALL MEDS BY PRESCRIBER/CLIN PHARMACIST DOCUMENTED: ICD-10-PCS | Mod: CPTII,S$GLB,, | Performed by: FAMILY MEDICINE

## 2021-07-26 PROCEDURE — 3077F SYST BP >= 140 MM HG: CPT | Mod: CPTII,S$GLB,, | Performed by: FAMILY MEDICINE

## 2021-07-26 PROCEDURE — 1126F PR PAIN SEVERITY QUANTIFIED, NO PAIN PRESENT: ICD-10-PCS | Mod: CPTII,S$GLB,, | Performed by: FAMILY MEDICINE

## 2021-07-26 PROCEDURE — 3078F PR MOST RECENT DIASTOLIC BLOOD PRESSURE < 80 MM HG: ICD-10-PCS | Mod: CPTII,S$GLB,, | Performed by: FAMILY MEDICINE

## 2021-07-26 PROCEDURE — 99999 PR PBB SHADOW E&M-EST. PATIENT-LVL IV: ICD-10-PCS | Mod: PBBFAC,,, | Performed by: FAMILY MEDICINE

## 2021-07-26 PROCEDURE — 1101F PT FALLS ASSESS-DOCD LE1/YR: CPT | Mod: CPTII,S$GLB,, | Performed by: FAMILY MEDICINE

## 2021-07-26 PROCEDURE — 99999 PR PBB SHADOW E&M-EST. PATIENT-LVL IV: CPT | Mod: PBBFAC,,, | Performed by: FAMILY MEDICINE

## 2021-07-26 PROCEDURE — 1157F PR ADVANCE CARE PLAN OR EQUIV PRESENT IN MEDICAL RECORD: ICD-10-PCS | Mod: CPTII,S$GLB,, | Performed by: FAMILY MEDICINE

## 2021-07-26 PROCEDURE — 1159F MED LIST DOCD IN RCRD: CPT | Mod: CPTII,S$GLB,, | Performed by: FAMILY MEDICINE

## 2021-07-26 PROCEDURE — 1159F PR MEDICATION LIST DOCUMENTED IN MEDICAL RECORD: ICD-10-PCS | Mod: CPTII,S$GLB,, | Performed by: FAMILY MEDICINE

## 2021-07-26 PROCEDURE — 1160F RVW MEDS BY RX/DR IN RCRD: CPT | Mod: CPTII,S$GLB,, | Performed by: FAMILY MEDICINE

## 2021-07-26 PROCEDURE — 99214 PR OFFICE/OUTPT VISIT, EST, LEVL IV, 30-39 MIN: ICD-10-PCS | Mod: S$GLB,,, | Performed by: FAMILY MEDICINE

## 2021-07-26 PROCEDURE — 3288F PR FALLS RISK ASSESSMENT DOCUMENTED: ICD-10-PCS | Mod: CPTII,S$GLB,, | Performed by: FAMILY MEDICINE

## 2021-07-26 PROCEDURE — 1126F AMNT PAIN NOTED NONE PRSNT: CPT | Mod: CPTII,S$GLB,, | Performed by: FAMILY MEDICINE

## 2021-07-26 PROCEDURE — 1157F ADVNC CARE PLAN IN RCRD: CPT | Mod: CPTII,S$GLB,, | Performed by: FAMILY MEDICINE

## 2021-07-26 PROCEDURE — 3288F FALL RISK ASSESSMENT DOCD: CPT | Mod: CPTII,S$GLB,, | Performed by: FAMILY MEDICINE

## 2021-07-26 PROCEDURE — 3078F DIAST BP <80 MM HG: CPT | Mod: CPTII,S$GLB,, | Performed by: FAMILY MEDICINE

## 2021-09-17 ENCOUNTER — PATIENT MESSAGE (OUTPATIENT)
Dept: FAMILY MEDICINE | Facility: CLINIC | Age: 81
End: 2021-09-17

## 2021-10-15 ENCOUNTER — PATIENT MESSAGE (OUTPATIENT)
Dept: FAMILY MEDICINE | Facility: CLINIC | Age: 81
End: 2021-10-15
Payer: MEDICARE

## 2021-11-01 ENCOUNTER — PATIENT OUTREACH (OUTPATIENT)
Dept: FAMILY MEDICINE | Facility: CLINIC | Age: 81
End: 2021-11-01
Payer: MEDICARE

## 2021-11-03 ENCOUNTER — PATIENT MESSAGE (OUTPATIENT)
Dept: FAMILY MEDICINE | Facility: CLINIC | Age: 81
End: 2021-11-03
Payer: MEDICARE

## 2021-11-08 ENCOUNTER — TELEPHONE (OUTPATIENT)
Dept: FAMILY MEDICINE | Facility: CLINIC | Age: 81
End: 2021-11-08
Payer: MEDICARE

## 2021-11-08 VITALS — SYSTOLIC BLOOD PRESSURE: 129 MMHG | DIASTOLIC BLOOD PRESSURE: 63 MMHG

## 2021-11-30 ENCOUNTER — PATIENT MESSAGE (OUTPATIENT)
Dept: FAMILY MEDICINE | Facility: CLINIC | Age: 81
End: 2021-11-30
Payer: MEDICARE

## 2021-12-02 ENCOUNTER — LAB VISIT (OUTPATIENT)
Dept: FAMILY MEDICINE | Facility: CLINIC | Age: 81
End: 2021-12-02
Payer: MEDICARE

## 2021-12-02 DIAGNOSIS — I10 ESSENTIAL HYPERTENSION: ICD-10-CM

## 2021-12-02 DIAGNOSIS — N18.32 STAGE 3B CHRONIC KIDNEY DISEASE: ICD-10-CM

## 2021-12-02 LAB
ANION GAP SERPL CALC-SCNC: 9 MMOL/L (ref 8–16)
BUN SERPL-MCNC: 20 MG/DL (ref 8–23)
CALCIUM SERPL-MCNC: 9.7 MG/DL (ref 8.7–10.5)
CHLORIDE SERPL-SCNC: 106 MMOL/L (ref 95–110)
CO2 SERPL-SCNC: 23 MMOL/L (ref 23–29)
CREAT SERPL-MCNC: 1.4 MG/DL (ref 0.5–1.4)
EST. GFR  (AFRICAN AMERICAN): 54.5 ML/MIN/1.73 M^2
EST. GFR  (NON AFRICAN AMERICAN): 47.1 ML/MIN/1.73 M^2
GLUCOSE SERPL-MCNC: 96 MG/DL (ref 70–110)
POTASSIUM SERPL-SCNC: 4.1 MMOL/L (ref 3.5–5.1)
SODIUM SERPL-SCNC: 138 MMOL/L (ref 136–145)

## 2021-12-02 PROCEDURE — 80048 BASIC METABOLIC PNL TOTAL CA: CPT | Performed by: FAMILY MEDICINE

## 2021-12-22 ENCOUNTER — PATIENT MESSAGE (OUTPATIENT)
Dept: FAMILY MEDICINE | Facility: CLINIC | Age: 81
End: 2021-12-22
Payer: MEDICARE

## 2022-01-13 ENCOUNTER — PATIENT MESSAGE (OUTPATIENT)
Dept: ADMINISTRATIVE | Facility: OTHER | Age: 82
End: 2022-01-13
Payer: MEDICARE

## 2022-01-13 ENCOUNTER — PATIENT MESSAGE (OUTPATIENT)
Dept: FAMILY MEDICINE | Facility: CLINIC | Age: 82
End: 2022-01-13
Payer: MEDICARE

## 2022-01-13 ENCOUNTER — PATIENT MESSAGE (OUTPATIENT)
Dept: FAMILY MEDICINE | Facility: CLINIC | Age: 82
End: 2022-01-13

## 2022-01-13 ENCOUNTER — OFFICE VISIT (OUTPATIENT)
Dept: FAMILY MEDICINE | Facility: CLINIC | Age: 82
End: 2022-01-13
Payer: MEDICARE

## 2022-01-13 DIAGNOSIS — I10 PRIMARY HYPERTENSION: Primary | ICD-10-CM

## 2022-01-13 PROCEDURE — 99211 OFF/OP EST MAY X REQ PHY/QHP: CPT | Mod: S$GLB,,, | Performed by: FAMILY MEDICINE

## 2022-01-13 PROCEDURE — 1157F PR ADVANCE CARE PLAN OR EQUIV PRESENT IN MEDICAL RECORD: ICD-10-PCS | Mod: CPTII,S$GLB,, | Performed by: FAMILY MEDICINE

## 2022-01-13 PROCEDURE — 99211 PR OFFICE/OUTPT VISIT, EST, LEVL I: ICD-10-PCS | Mod: S$GLB,,, | Performed by: FAMILY MEDICINE

## 2022-01-13 PROCEDURE — 1157F ADVNC CARE PLAN IN RCRD: CPT | Mod: CPTII,S$GLB,, | Performed by: FAMILY MEDICINE

## 2022-01-13 NOTE — ASSESSMENT & PLAN NOTE
- blood pressure check only, was supposed to be a nurses visit  - patient needs to re-enroll in the digital hypertension program, patient states he has the contact information and will call them  - reduce stress at home and continue diet and exercise regimen  - patient is currently at maximum dosage on both blood pressure medications, given that patients new to me today and his age will hold off on adding a third medication

## 2022-01-13 NOTE — PROGRESS NOTES
Subjective:       Patient ID: Mina Valentine is a 81 y.o. male.    Chief Complaint: No chief complaint on file.    81 M presents to clinic for blood pressure recheck. He was scheduled for a nurses visit.  States he feels it is high. He has been under stress with his family lately and just before entering the exam room there were issues in the waiting area that caused the patient to feel anxious. Allowed patient to rest and BP was 154/80. Patient brought in his home monitor with readings of -131 and DBP 80-59. States he takes his BP multiple times a day. His machine did not calibrate properly with our manual reading. Patient was previously enrolled in the digital hypertension program, would like him to re-enroll. No further complaints noted.         Current Outpatient Medications:     cloNIDine (CATAPRES) 0.1 MG tablet, Take 1 tablet (0.1 mg total) by mouth every 4 (four) hours as needed. Blood pressure over 180 on TOP (systolic)) or over 95 on BOTTOM (diastolic), Disp: 30 tablet, Rfl: 0    fexofenadine (ALLEGRA) 180 MG tablet, Take 1 tablet (180 mg total) by mouth once daily. For allergies, Disp: 90 tablet, Rfl: 3    latanoprost 0.005 % ophthalmic solution, , Disp: , Rfl:     levothyroxine (SYNTHROID) 75 MCG tablet, TAKE 1 TABLET EVERY DAY ( NEED  APPOINTMENT AND LAB ), Disp: 90 tablet, Rfl: 3    mecobalamin (B12 ACTIVE ORAL), Take 1 tablet by mouth once daily., Disp: , Rfl:     mv-mn/iron/folic acid/herb 190 (VITAMIN D3 COMPLETE ORAL), Take 1 tablet by mouth once daily., Disp: , Rfl:     NIFEdipine (PROCARDIA-XL) 60 MG (OSM) 24 hr tablet, TAKE 1 TABLET EVERY DAY (NEW DOSE), Disp: 90 tablet, Rfl: 1    omega 3-dha-epa-fish oil (FISH OIL) 120-180-500 mg Cap, , Disp: , Rfl:     pravastatin (PRAVACHOL) 40 MG tablet, TAKE 1 TABLET EVERY DAY, Disp: 90 tablet, Rfl: 3    triamcinolone acetonide 0.1% (KENALOG) 0.1 % ointment, Apply topically 2 (two) times daily. for 14 days, Disp: 30 g, Rfl: 1     valsartan (DIOVAN) 320 MG tablet, TAKE 1 TABLET EVERY MORNING, Disp: 90 tablet, Rfl: 1    Current Facility-Administered Medications:     cyanocobalamin injection 1,000 mcg, 1,000 mcg, Intramuscular, Once, Blanca Hatch MD    Review of patient's allergies indicates:   Allergen Reactions    Penicillins Rash     Other reaction(s): Flushing (skin)       Past Medical History:   Diagnosis Date    Basal cell carcinoma     Basal cell carcinoma of face , mohs    left medial cheek, right upper cheek    BCC (basal cell carcinoma of skin) 2014    BCC (basal cell carcinoma), face     Deviated septum     Glaucoma     History of prostate cancer 2/10/2016    HTN (hypertension)     Hyperlipidemia     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 2009    CORONARY ARTERY BYPASS GRAFT      EYE SURGERY      cataract surgery    HERNIA REPAIR  2004    belly button    hydrocele  1960    PROSTATE SURGERY  2007    seed implant    UMBILICAL HERNIA REPAIR      x2       Family History   Problem Relation Age of Onset    Dementia Mother     Coronary artery disease Father 58         MI    Diabetes Father         type 1    Heart disease Father     Cancer Brother 89        brain cancer    Heart disease Sister         a fib    No Known Problems Daughter     No Known Problems Son     No Known Problems Sister     No Known Problems Daughter        Social History     Tobacco Use    Smoking status: Former Smoker     Packs/day: 1.00     Years: 10.00     Pack years: 10.00     Types: Cigarettes, Cigars     Quit date: 1985     Years since quittin.0    Smokeless tobacco: Never Used   Substance Use Topics     Alcohol use: No    Drug use: No       Review of Systems           Objective:      There were no vitals filed for this visit.  Physical Exam      Lab Results   Component Value Date    WBC 7.69 06/23/2021    HGB 13.7 (L) 06/23/2021    HCT 41.1 06/23/2021     06/23/2021    CHOL 195 06/23/2021    TRIG 130 06/23/2021    HDL 54 06/23/2021    ALT 25 06/23/2021    AST 18 06/23/2021     12/02/2021    K 4.1 12/02/2021     12/02/2021    CREATININE 1.4 12/02/2021    BUN 20 12/02/2021    CO2 23 12/02/2021    TSH 2.762 10/14/2020    PSA 0.11 01/22/2013    INR 1.0 10/27/2020    HGBA1C 5.3 06/23/2021      Assessment:       1. Primary hypertension        Plan:         Problem List Items Addressed This Visit        Cardiac/Vascular    HTN (hypertension) - Primary     - blood pressure check only, was supposed to be a nurses visit  - patient needs to re-enroll in the digital hypertension program, patient states he has the contact information and will call them  - reduce stress at home and continue diet and exercise regimen  - patient is currently at maximum dosage on both blood pressure medications, given that patients new to me today and his age will hold off on adding a third medication                 Follow up in about 2 weeks (around 1/27/2022), or if symptoms worsen or fail to improve, nurses visit in 2 weeks for BP check. Keep scheduled appointment at end of February with Dr. Holden Bourne MD

## 2022-01-27 ENCOUNTER — CLINICAL SUPPORT (OUTPATIENT)
Dept: FAMILY MEDICINE | Facility: CLINIC | Age: 82
End: 2022-01-27
Payer: MEDICARE

## 2022-01-27 VITALS — DIASTOLIC BLOOD PRESSURE: 78 MMHG | SYSTOLIC BLOOD PRESSURE: 138 MMHG

## 2022-01-27 PROCEDURE — 99999 PR PBB SHADOW E&M-EST. PATIENT-LVL I: ICD-10-PCS | Mod: PBBFAC,,,

## 2022-01-27 PROCEDURE — 99999 PR PBB SHADOW E&M-EST. PATIENT-LVL I: CPT | Mod: PBBFAC,,,

## 2022-01-27 NOTE — NURSING
Mina Valentine  81 y.o. male is here today for a repeat BP check.     History of HTN yes.  Patient was placed in an exam room and instructed to relax for 5 minutes prior to rechecking blood pressure.     Patient verifies taking blood pressure medications on a regular basis at the same time of the day.   Does patient have record of home blood pressure readings yes.      Blood pressure cuff was placed on patient's  right arm with  Large manual cuff, blood pressure read 142/79  Patient was in a sitting position with legs uncrossed and feet flat on the floor.   Patient was asked to remain silent thru the reading.     Recheck BP  Rechecked patient blood pressure      Patient was asked to sit for 5 minutes and relax.   After 5 minutes, blood pressure cuff was placed on patient's  left arm with a Large manual cuff, blood pressure read 148 /78.  Patient was in a sitting position with legs uncrossed and flat on the floor.    Patient was asked to remain silent thru the reading.         Patient has follow up visit with Dr. Hatch on 2/24/22    Patient was given time to ask questions. Patient left the clinic in satisfactory condition and voiced understanding of all information discussed.     Vin Hung LPN

## 2022-02-24 ENCOUNTER — OFFICE VISIT (OUTPATIENT)
Dept: FAMILY MEDICINE | Facility: CLINIC | Age: 82
End: 2022-02-24
Payer: MEDICARE

## 2022-02-24 VITALS
HEART RATE: 85 BPM | DIASTOLIC BLOOD PRESSURE: 62 MMHG | HEIGHT: 68 IN | SYSTOLIC BLOOD PRESSURE: 150 MMHG | WEIGHT: 168.19 LBS | OXYGEN SATURATION: 98 % | BODY MASS INDEX: 25.49 KG/M2 | TEMPERATURE: 98 F

## 2022-02-24 DIAGNOSIS — E78.2 MIXED HYPERLIPIDEMIA: ICD-10-CM

## 2022-02-24 DIAGNOSIS — I10 ESSENTIAL HYPERTENSION: Primary | ICD-10-CM

## 2022-02-24 DIAGNOSIS — I10 PRIMARY HYPERTENSION: ICD-10-CM

## 2022-02-24 DIAGNOSIS — E03.9 ACQUIRED HYPOTHYROIDISM: ICD-10-CM

## 2022-02-24 DIAGNOSIS — Z13.1 ENCOUNTER FOR SCREENING EXAMINATION FOR IMPAIRED GLUCOSE REGULATION AND DIABETES MELLITUS: ICD-10-CM

## 2022-02-24 DIAGNOSIS — N18.32 STAGE 3B CHRONIC KIDNEY DISEASE: ICD-10-CM

## 2022-02-24 DIAGNOSIS — R79.9 ABNORMAL FINDING OF BLOOD CHEMISTRY, UNSPECIFIED: ICD-10-CM

## 2022-02-24 DIAGNOSIS — D51.8 OTHER VITAMIN B12 DEFICIENCY ANEMIAS: ICD-10-CM

## 2022-02-24 PROCEDURE — 1126F AMNT PAIN NOTED NONE PRSNT: CPT | Mod: CPTII,S$GLB,, | Performed by: FAMILY MEDICINE

## 2022-02-24 PROCEDURE — 1157F ADVNC CARE PLAN IN RCRD: CPT | Mod: CPTII,S$GLB,, | Performed by: FAMILY MEDICINE

## 2022-02-24 PROCEDURE — 1126F PR PAIN SEVERITY QUANTIFIED, NO PAIN PRESENT: ICD-10-PCS | Mod: CPTII,S$GLB,, | Performed by: FAMILY MEDICINE

## 2022-02-24 PROCEDURE — 3078F DIAST BP <80 MM HG: CPT | Mod: CPTII,S$GLB,, | Performed by: FAMILY MEDICINE

## 2022-02-24 PROCEDURE — 1160F PR REVIEW ALL MEDS BY PRESCRIBER/CLIN PHARMACIST DOCUMENTED: ICD-10-PCS | Mod: CPTII,S$GLB,, | Performed by: FAMILY MEDICINE

## 2022-02-24 PROCEDURE — 1157F PR ADVANCE CARE PLAN OR EQUIV PRESENT IN MEDICAL RECORD: ICD-10-PCS | Mod: CPTII,S$GLB,, | Performed by: FAMILY MEDICINE

## 2022-02-24 PROCEDURE — 3288F FALL RISK ASSESSMENT DOCD: CPT | Mod: CPTII,S$GLB,, | Performed by: FAMILY MEDICINE

## 2022-02-24 PROCEDURE — 1101F PR PT FALLS ASSESS DOC 0-1 FALLS W/OUT INJ PAST YR: ICD-10-PCS | Mod: CPTII,S$GLB,, | Performed by: FAMILY MEDICINE

## 2022-02-24 PROCEDURE — 1159F MED LIST DOCD IN RCRD: CPT | Mod: CPTII,S$GLB,, | Performed by: FAMILY MEDICINE

## 2022-02-24 PROCEDURE — 1101F PT FALLS ASSESS-DOCD LE1/YR: CPT | Mod: CPTII,S$GLB,, | Performed by: FAMILY MEDICINE

## 2022-02-24 PROCEDURE — 3078F PR MOST RECENT DIASTOLIC BLOOD PRESSURE < 80 MM HG: ICD-10-PCS | Mod: CPTII,S$GLB,, | Performed by: FAMILY MEDICINE

## 2022-02-24 PROCEDURE — 3077F SYST BP >= 140 MM HG: CPT | Mod: CPTII,S$GLB,, | Performed by: FAMILY MEDICINE

## 2022-02-24 PROCEDURE — 99215 OFFICE O/P EST HI 40 MIN: CPT | Mod: S$GLB,,, | Performed by: FAMILY MEDICINE

## 2022-02-24 PROCEDURE — 1159F PR MEDICATION LIST DOCUMENTED IN MEDICAL RECORD: ICD-10-PCS | Mod: CPTII,S$GLB,, | Performed by: FAMILY MEDICINE

## 2022-02-24 PROCEDURE — 3288F PR FALLS RISK ASSESSMENT DOCUMENTED: ICD-10-PCS | Mod: CPTII,S$GLB,, | Performed by: FAMILY MEDICINE

## 2022-02-24 PROCEDURE — 99999 PR PBB SHADOW E&M-EST. PATIENT-LVL IV: ICD-10-PCS | Mod: PBBFAC,,, | Performed by: FAMILY MEDICINE

## 2022-02-24 PROCEDURE — 99215 PR OFFICE/OUTPT VISIT, EST, LEVL V, 40-54 MIN: ICD-10-PCS | Mod: S$GLB,,, | Performed by: FAMILY MEDICINE

## 2022-02-24 PROCEDURE — 1160F RVW MEDS BY RX/DR IN RCRD: CPT | Mod: CPTII,S$GLB,, | Performed by: FAMILY MEDICINE

## 2022-02-24 PROCEDURE — 99999 PR PBB SHADOW E&M-EST. PATIENT-LVL IV: CPT | Mod: PBBFAC,,, | Performed by: FAMILY MEDICINE

## 2022-02-24 PROCEDURE — 3077F PR MOST RECENT SYSTOLIC BLOOD PRESSURE >= 140 MM HG: ICD-10-PCS | Mod: CPTII,S$GLB,, | Performed by: FAMILY MEDICINE

## 2022-02-24 RX ORDER — CYANOCOBALAMIN 1000 UG/ML
1000 INJECTION, SOLUTION INTRAMUSCULAR; SUBCUTANEOUS ONCE
Status: DISCONTINUED | OUTPATIENT
Start: 2022-02-24 | End: 2022-02-24

## 2022-02-24 NOTE — PROGRESS NOTES
Subjective:       Patient ID: Mina Valentine is a 82 y.o. male.    Chief Complaint: Annual Exam    Mina Valentine is 82 y.o. year old white male who presents today for annual medical examination.     Diet and Exercise  The patient describes his diet as regular, mostly healthy.  It includes 3-5 servings of fruits and vegetables, 2 servings of protein, and 1 of milk/dairy products per day.   His exercise routine includes 20 minutes per day,5 days per week of walking, planks, lifting weights.  He does take a multivitamin.  He denies taking herbal supplements.    Home Life  The patient lives with his wife at home.    He states that she does feel safe at home.    Occupation  The patient is retired.    UTD on vaccines except for Shingrix.    Past medical, surgical, family, social, medication and allergy history were reviewed and updated as needed.      Assessment and Plan:  Mina was seen today for annual exam.    Diagnoses and associated orders for this visit:     Essential hypertension   CBC Auto Differential; Future   Comprehensive Metabolic Panel; Future   TSH; Future   T4, Free; Future   Urinalysis, Reflex to Urine Culture Urine, Clean Catch; Future     Stage 3b chronic kidney disease     Mixed hyperlipidemia   Lipid Panel; Future     Primary hypertension     Other vitamin B12 deficiency anemias   Vitamin B12; Future     Abnormal finding of blood chemistry, unspecified   Ferritin; Future   Iron and TIBC; Future     Acquired hypothyroidism   TSH; Future   T4, Free; Future     Encounter for screening examination for impaired glucose regulation and diabetes mellitus   Hemoglobin A1C; Future     Other orders   Discontinue: cyanocobalamin injection 1,000 mcg        End of Life   I  discussed end of life care with the patient.  He indicates that he will think about his desire to be full code.  He has agreed to review a packet that I provided and send it back to me for scanning into his chart.   He does not  have an advanced directive.        Depression Patient Health Questionnaire 2/24/2022 7/26/2021 5/18/2018 10/18/2016 3/31/2015   Over the last two weeks how often have you been bothered by little interest or pleasure in doing things 0 0 0 0 0   Over the last two weeks how often have you been bothered by feeling down, depressed or hopeless 0 0 0 0 0   PHQ-2 Total Score 0 0 0 0 0   Over the last two weeks how often have you been bothered by trouble falling or staying asleep, or sleeping too much - - - 0 0   Over the last two weeks how often have you been bothered by feeling tired or having little energy - - - 0 0   Over the last two weeks how often have you been bothered by a poor appetite or overeating - - - 0 0   Over the last two weeks how often have you been bothered by feeling bad about yourself - or that you are a failure or have let yourself or your family down - - - 0 0   Over the last two weeks how often have you been bothered by trouble concentrating on things, such as reading the newspaper or watching television - - - 0 0   Over the last two weeks how often have you been bothered by moving or speaking so slowly that other people could have noticed. Or the opposite - being so fidgety or restless that you have been moving around a lot more than usual. - - - 0 0   Over the last two weeks how often have you been bothered by thoughts that you would be better off dead, or of hurting yourself - - - 0 0   If you checked off any problems, how difficult have these problems made it for you to do your work, take care of things at home or get along with other people? - - - Not difficult at all Not difficult at all   Total Score - - - 0 0     Review of Systems   All other systems reviewed and are negative.        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     CORONARY ARTERY BYPASS GRAFT      EYE SURGERY      cataract surgery    HERNIA REPAIR  2004    belly button    hydrocele  1960    PROSTATE SURGERY  2007    seed implant    UMBILICAL HERNIA REPAIR      x2     Family History   Problem Relation Age of Onset    Dementia Mother     Coronary artery disease Father 58         MI    Diabetes Father         type 1    Heart disease Father     Cancer Brother 89        brain cancer    Heart disease Sister         a fib    No Known Problems Daughter     No Known Problems Son     No Known Problems Sister     No Known Problems Daughter        Review of patient's allergies indicates:   Allergen Reactions    Penicillins Rash     Other reaction(s): Flushing (skin)       Current Outpatient Medications:     cloNIDine (CATAPRES) 0.1 MG tablet, Take 1 tablet (0.1 mg total) by mouth every 4 (four) hours as needed. Blood pressure over 180 on TOP (systolic)) or over 95 on BOTTOM (diastolic) (Patient not taking: Reported on 3/10/2022), Disp: 30 tablet, Rfl: 0    latanoprost 0.005 % ophthalmic solution, , Disp: , Rfl:     levothyroxine (SYNTHROID) 75 MCG tablet, TAKE 1 TABLET EVERY DAY ( NEED  APPOINTMENT AND LAB ), Disp: 90 tablet, Rfl: 3    mecobalamin (B12 ACTIVE ORAL), Take 1 tablet by mouth once daily., Disp: , Rfl:     mv-mn/iron/folic acid/herb 190 (VITAMIN D3 COMPLETE ORAL), Take 1 tablet by mouth once daily., Disp: , Rfl:     NIFEdipine (PROCARDIA-XL) 60 MG (OSM) 24  "hr tablet, Take 1 tablet (60 mg total) by mouth 2 (two) times a day., Disp: 180 tablet, Rfl: 1    omega 3-dha-epa-fish oil 120-180-500 mg Cap, , Disp: , Rfl:     pravastatin (PRAVACHOL) 40 MG tablet, TAKE 1 TABLET EVERY DAY, Disp: 90 tablet, Rfl: 3    valsartan (DIOVAN) 320 MG tablet, TAKE 1 TABLET EVERY MORNING, Disp: 90 tablet, Rfl: 1    carvediloL (COREG) 3.125 MG tablet, Take 1 tablet (3.125 mg total) by mouth 2 (two) times daily with meals., Disp: 60 tablet, Rfl: 11    fexofenadine (ALLEGRA) 180 MG tablet, Take 1 tablet (180 mg total) by mouth once daily. For allergies, Disp: 90 tablet, Rfl: 3    triamcinolone acetonide 0.1% (KENALOG) 0.1 % ointment, Apply topically 2 (two) times daily. for 14 days, Disp: 30 g, Rfl: 1    Current Facility-Administered Medications:     cyanocobalamin injection 1,000 mcg, 1,000 mcg, Intramuscular, Once, Blanca H. MD Holden      Objective:      BP (!) 150/62 (BP Location: Right arm, Patient Position: Sitting, BP Method: Medium (Manual))   Pulse 85   Temp 98.3 °F (36.8 °C) (Oral)   Ht 5' 8" (1.727 m)   Wt 76.3 kg (168 lb 3.4 oz)   SpO2 98%   BMI 25.58 kg/m²   Physical Exam  Vitals and nursing note reviewed.   Constitutional:       General: He is not in acute distress.     Appearance: Normal appearance. He is well-developed and normal weight. He is not ill-appearing, toxic-appearing or diaphoretic.   HENT:      Head: Normocephalic and atraumatic.      Right Ear: External ear normal.      Left Ear: External ear normal.   Eyes:      General: No scleral icterus.     Conjunctiva/sclera: Conjunctivae normal.   Neck:      Thyroid: No thyromegaly.      Vascular: No carotid bruit or JVD.      Trachea: No tracheal deviation.   Cardiovascular:      Rate and Rhythm: Normal rate and regular rhythm.      Pulses: Normal pulses.      Heart sounds: Normal heart sounds. No murmur heard.    No friction rub. No gallop.   Pulmonary:      Effort: Pulmonary effort is normal. No respiratory " distress.      Breath sounds: Normal breath sounds. No stridor. No wheezing, rhonchi or rales.   Chest:      Chest wall: No tenderness.   Abdominal:      General: Abdomen is flat. There is no distension.   Musculoskeletal:         General: No deformity. Normal range of motion.      Cervical back: Normal range of motion and neck supple. No rigidity or tenderness. No muscular tenderness.      Right lower leg: No edema.      Left lower leg: No edema.   Lymphadenopathy:      Cervical: No cervical adenopathy.   Skin:     General: Skin is warm and dry.      Capillary Refill: Capillary refill takes less than 2 seconds.      Coloration: Skin is not jaundiced or pale.      Findings: No erythema or rash.   Neurological:      General: No focal deficit present.      Mental Status: He is alert and oriented to person, place, and time. Mental status is at baseline.      Motor: No weakness.      Coordination: Coordination normal.      Gait: Gait normal.   Psychiatric:         Mood and Affect: Mood normal.         Behavior: Behavior normal.         Thought Content: Thought content normal.         Judgment: Judgment normal.         Assessment:       1. Essential hypertension    2. Stage 3b chronic kidney disease    3. Mixed hyperlipidemia    4. Primary hypertension    5. Other vitamin B12 deficiency anemias    6. Abnormal finding of blood chemistry, unspecified    7. Acquired hypothyroidism    8. Encounter for screening examination for impaired glucose regulation and diabetes mellitus        Plan:       Essential hypertension  -     CBC Auto Differential; Future; Expected date: 02/24/2022  -     Comprehensive Metabolic Panel; Future; Expected date: 02/24/2022  -     TSH; Future; Expected date: 02/24/2022  -     T4, Free; Future; Expected date: 02/24/2022  -     Urinalysis, Reflex to Urine Culture Urine, Clean Catch; Future; Expected date: 02/24/2022    Stage 3b chronic kidney disease    Mixed hyperlipidemia  -     Lipid Panel;  Future; Expected date: 02/24/2022    Primary hypertension    Other vitamin B12 deficiency anemias  -     Vitamin B12; Future; Expected date: 02/24/2022    Abnormal finding of blood chemistry, unspecified  -     Ferritin; Future; Expected date: 02/24/2022  -     Iron and TIBC; Future; Expected date: 02/24/2022    Acquired hypothyroidism  -     TSH; Future; Expected date: 02/24/2022  -     T4, Free; Future; Expected date: 02/24/2022    Encounter for screening examination for impaired glucose regulation and diabetes mellitus  -     Hemoglobin A1C; Future; Expected date: 02/24/2022    Other orders  -     Discontinue: cyanocobalamin injection 1,000 mcg          Compare BP with his [employer] machine, wifes automatic machine with our manual check here in the clinic.  Labs in June in prep for his AWV.  Flu vaccine was done in September 2021.  He has had both covid vaccine doses and a booster dose (Moderna.)  Encouraged Shingrix for shingles protection later this year.      Previous records in Epic were reviewed, including the last 3 months of encounters, imaging, laboratory, and pathology reports.        Strict return precautions reviewed and patient verbalized understanding. Risks, benefits, and alternatives to the plan were reviewed in detail and all questions answered to the patient's satisfaction. Patient agrees to return to clinic or ER if symptoms worsen. 40 minutes total were spent on today's visit, not limited to but including time based on counseling and coordination of care.    Patient instructed that best way to communicate with my office staff is for patient to get on the [facility] patient portal to expedite communication and communication issues that may occur.  Patient was given instructions on how to get on the portal.  I encouraged patient to obtain portal access as well.  Ultimately it is up to the patient to obtain access.  Patient voiced understanding.    This note was created using M*Modal voice  recognition software that occasionally may misinterpret phrases or words.    Follow up in about 4 weeks (around 3/24/2022) for 2 weeks nurse visit for BP check, then 2 weeks later f/u with me.

## 2022-03-10 ENCOUNTER — TELEPHONE (OUTPATIENT)
Dept: FAMILY MEDICINE | Facility: CLINIC | Age: 82
End: 2022-03-10

## 2022-03-10 ENCOUNTER — CLINICAL SUPPORT (OUTPATIENT)
Dept: FAMILY MEDICINE | Facility: CLINIC | Age: 82
End: 2022-03-10
Payer: MEDICARE

## 2022-03-10 VITALS — DIASTOLIC BLOOD PRESSURE: 50 MMHG | SYSTOLIC BLOOD PRESSURE: 160 MMHG

## 2022-03-10 PROCEDURE — 99999 PR PBB SHADOW E&M-EST. PATIENT-LVL II: CPT | Mod: PBBFAC,,,

## 2022-03-10 PROCEDURE — 99999 PR PBB SHADOW E&M-EST. PATIENT-LVL II: ICD-10-PCS | Mod: PBBFAC,,,

## 2022-03-10 RX ORDER — CARVEDILOL 3.12 MG/1
3.12 TABLET ORAL 2 TIMES DAILY WITH MEALS
Qty: 60 TABLET | Refills: 11 | Status: SHIPPED | OUTPATIENT
Start: 2022-03-10 | End: 2022-12-27

## 2022-03-10 NOTE — PROGRESS NOTES
Mina Valentine 82 y.o. male is here today for Blood Pressure check.   History of HTN yes.    Review of patient's allergies indicates:   Allergen Reactions    Penicillins Rash     Other reaction(s): Flushing (skin)     Creatinine   Date Value Ref Range Status   12/02/2021 1.4 0.5 - 1.4 mg/dL Final     Sodium   Date Value Ref Range Status   12/02/2021 138 136 - 145 mmol/L Final     Potassium   Date Value Ref Range Status   12/02/2021 4.1 3.5 - 5.1 mmol/L Final   ]  Patient verifies taking blood pressure medications on a regular basis at the same time of the day.     Current Outpatient Medications:     fexofenadine (ALLEGRA) 180 MG tablet, Take 1 tablet (180 mg total) by mouth once daily. For allergies, Disp: 90 tablet, Rfl: 3    latanoprost 0.005 % ophthalmic solution, , Disp: , Rfl:     levothyroxine (SYNTHROID) 75 MCG tablet, TAKE 1 TABLET EVERY DAY ( NEED  APPOINTMENT AND LAB ), Disp: 90 tablet, Rfl: 3    mecobalamin (B12 ACTIVE ORAL), Take 1 tablet by mouth once daily., Disp: , Rfl:     mv-mn/iron/folic acid/herb 190 (VITAMIN D3 COMPLETE ORAL), Take 1 tablet by mouth once daily., Disp: , Rfl:     NIFEdipine (PROCARDIA-XL) 60 MG (OSM) 24 hr tablet, Take 1 tablet (60 mg total) by mouth 2 (two) times a day., Disp: 180 tablet, Rfl: 1    omega 3-dha-epa-fish oil 120-180-500 mg Cap, , Disp: , Rfl:     pravastatin (PRAVACHOL) 40 MG tablet, TAKE 1 TABLET EVERY DAY, Disp: 90 tablet, Rfl: 3    valsartan (DIOVAN) 320 MG tablet, TAKE 1 TABLET EVERY MORNING, Disp: 90 tablet, Rfl: 1    cloNIDine (CATAPRES) 0.1 MG tablet, Take 1 tablet (0.1 mg total) by mouth every 4 (four) hours as needed. Blood pressure over 180 on TOP (systolic)) or over 95 on BOTTOM (diastolic) (Patient not taking: Reported on 3/10/2022), Disp: 30 tablet, Rfl: 0    triamcinolone acetonide 0.1% (KENALOG) 0.1 % ointment, Apply topically 2 (two) times daily. for 14 days, Disp: 30 g, Rfl: 1    Current Facility-Administered Medications:      cyanocobalamin injection 1,000 mcg, 1,000 mcg, Intramuscular, Once, Blanca WILSON. MD Holden  Does patient have record of home blood pressure readings yes. Readings have been averaging 148/57.   Last dose of blood pressure medication was taken at 0700  Patient is asymptomatic.   Complains of nothing.    150/50, [facility] 161/66, [facility] 165/88      Dr. Hatch notified.

## 2022-03-10 NOTE — TELEPHONE ENCOUNTER
Patient came in for his BP check. He is doing digital medicine and I checked his BP machines agoinst my manual. All 3 blood pressures were in the 160-165/66-58. His home readings are averaging 148/54. One time the systolic got in the 190 range at home. Do you need to tweak his medication?

## 2022-03-10 NOTE — PROGRESS NOTES
Add Coreg 3.125mg bid, nurse visit within 10-14 days for BP recheck. Continue to check BP at home also.

## 2022-03-17 ENCOUNTER — PATIENT MESSAGE (OUTPATIENT)
Dept: FAMILY MEDICINE | Facility: CLINIC | Age: 82
End: 2022-03-17
Payer: MEDICARE

## 2022-03-21 ENCOUNTER — PATIENT MESSAGE (OUTPATIENT)
Dept: FAMILY MEDICINE | Facility: CLINIC | Age: 82
End: 2022-03-21
Payer: MEDICARE

## 2022-03-24 ENCOUNTER — CLINICAL SUPPORT (OUTPATIENT)
Dept: FAMILY MEDICINE | Facility: CLINIC | Age: 82
End: 2022-03-24
Payer: MEDICARE

## 2022-03-24 VITALS — SYSTOLIC BLOOD PRESSURE: 138 MMHG | DIASTOLIC BLOOD PRESSURE: 68 MMHG

## 2022-03-24 PROCEDURE — 99999 PR PBB SHADOW E&M-EST. PATIENT-LVL I: CPT | Mod: PBBFAC,,,

## 2022-03-24 PROCEDURE — 99999 PR PBB SHADOW E&M-EST. PATIENT-LVL I: ICD-10-PCS | Mod: PBBFAC,,,

## 2022-04-13 ENCOUNTER — PATIENT MESSAGE (OUTPATIENT)
Dept: ADMINISTRATIVE | Facility: OTHER | Age: 82
End: 2022-04-13
Payer: MEDICARE

## 2022-05-03 ENCOUNTER — TELEPHONE (OUTPATIENT)
Dept: FAMILY MEDICINE | Facility: CLINIC | Age: 82
End: 2022-05-03
Payer: MEDICARE

## 2022-05-03 NOTE — TELEPHONE ENCOUNTER
Patient wasn't sure who called him and there were notes in the chart. No one from [facility] attempted to call patient. Patient verbalized understanding.

## 2022-05-03 NOTE — TELEPHONE ENCOUNTER
----- Message from Agnieszka Escobar sent at 5/3/2022 12:40 PM CDT -----  Contact: Self  Type:  Patient Returning Call    Who Called:  Patient  Who Left Message for Patient:  pt doesn't know   Does the patient know what this is regarding?:  He missed the call  Best Call Back Number:  [phone] (home)   Additional Information:  Thank You

## 2022-05-09 ENCOUNTER — PATIENT MESSAGE (OUTPATIENT)
Dept: FAMILY MEDICINE | Facility: CLINIC | Age: 82
End: 2022-05-09
Payer: MEDICARE

## 2022-06-06 ENCOUNTER — LAB VISIT (OUTPATIENT)
Dept: FAMILY MEDICINE | Facility: CLINIC | Age: 82
End: 2022-06-06
Payer: MEDICARE

## 2022-06-06 DIAGNOSIS — E78.2 MIXED HYPERLIPIDEMIA: ICD-10-CM

## 2022-06-06 DIAGNOSIS — E03.9 ACQUIRED HYPOTHYROIDISM: ICD-10-CM

## 2022-06-06 DIAGNOSIS — Z13.1 ENCOUNTER FOR SCREENING EXAMINATION FOR IMPAIRED GLUCOSE REGULATION AND DIABETES MELLITUS: ICD-10-CM

## 2022-06-06 DIAGNOSIS — I10 ESSENTIAL HYPERTENSION: ICD-10-CM

## 2022-06-06 DIAGNOSIS — D51.8 OTHER VITAMIN B12 DEFICIENCY ANEMIAS: ICD-10-CM

## 2022-06-06 DIAGNOSIS — R79.9 ABNORMAL FINDING OF BLOOD CHEMISTRY, UNSPECIFIED: ICD-10-CM

## 2022-06-06 LAB
ALBUMIN SERPL BCP-MCNC: 3.8 G/DL (ref 3.5–5.2)
ALP SERPL-CCNC: 69 U/L (ref 55–135)
ALT SERPL W/O P-5'-P-CCNC: 21 U/L (ref 10–44)
ANION GAP SERPL CALC-SCNC: 8 MMOL/L (ref 8–16)
AST SERPL-CCNC: 17 U/L (ref 10–40)
BASOPHILS # BLD AUTO: 0.05 K/UL (ref 0–0.2)
BASOPHILS NFR BLD: 0.6 % (ref 0–1.9)
BILIRUB SERPL-MCNC: 0.9 MG/DL (ref 0.1–1)
BILIRUB UR QL STRIP: NEGATIVE
BUN SERPL-MCNC: 23 MG/DL (ref 8–23)
CALCIUM SERPL-MCNC: 9.6 MG/DL (ref 8.7–10.5)
CHLORIDE SERPL-SCNC: 109 MMOL/L (ref 95–110)
CHOLEST SERPL-MCNC: 193 MG/DL (ref 120–199)
CHOLEST/HDLC SERPL: 3.9 {RATIO} (ref 2–5)
CLARITY UR: CLEAR
CO2 SERPL-SCNC: 23 MMOL/L (ref 23–29)
COLOR UR: YELLOW
CREAT SERPL-MCNC: 1.5 MG/DL (ref 0.5–1.4)
DIFFERENTIAL METHOD: ABNORMAL
EOSINOPHIL # BLD AUTO: 0.2 K/UL (ref 0–0.5)
EOSINOPHIL NFR BLD: 2.5 % (ref 0–8)
ERYTHROCYTE [DISTWIDTH] IN BLOOD BY AUTOMATED COUNT: 12.6 % (ref 11.5–14.5)
EST. GFR  (AFRICAN AMERICAN): 49.8 ML/MIN/1.73 M^2
EST. GFR  (NON AFRICAN AMERICAN): 43 ML/MIN/1.73 M^2
ESTIMATED AVG GLUCOSE: 105 MG/DL (ref 68–131)
GLUCOSE SERPL-MCNC: 106 MG/DL (ref 70–110)
GLUCOSE UR QL STRIP: NEGATIVE
HBA1C MFR BLD: 5.3 % (ref 4–5.6)
HCT VFR BLD AUTO: 39.8 % (ref 40–54)
HDLC SERPL-MCNC: 50 MG/DL (ref 40–75)
HDLC SERPL: 25.9 % (ref 20–50)
HGB BLD-MCNC: 13.5 G/DL (ref 14–18)
HGB UR QL STRIP: ABNORMAL
IMM GRANULOCYTES # BLD AUTO: 0.02 K/UL (ref 0–0.04)
IMM GRANULOCYTES NFR BLD AUTO: 0.2 % (ref 0–0.5)
IRON SERPL-MCNC: 112 UG/DL (ref 45–160)
KETONES UR QL STRIP: NEGATIVE
LDLC SERPL CALC-MCNC: 117 MG/DL (ref 63–159)
LEUKOCYTE ESTERASE UR QL STRIP: NEGATIVE
LYMPHOCYTES # BLD AUTO: 2.7 K/UL (ref 1–4.8)
LYMPHOCYTES NFR BLD: 32.7 % (ref 18–48)
MCH RBC QN AUTO: 31.5 PG (ref 27–31)
MCHC RBC AUTO-ENTMCNC: 33.9 G/DL (ref 32–36)
MCV RBC AUTO: 93 FL (ref 82–98)
MONOCYTES # BLD AUTO: 0.7 K/UL (ref 0.3–1)
MONOCYTES NFR BLD: 8.9 % (ref 4–15)
NEUTROPHILS # BLD AUTO: 4.6 K/UL (ref 1.8–7.7)
NEUTROPHILS NFR BLD: 55.1 % (ref 38–73)
NITRITE UR QL STRIP: NEGATIVE
NONHDLC SERPL-MCNC: 143 MG/DL
NRBC BLD-RTO: 0 /100 WBC
PH UR STRIP: 6 [PH] (ref 5–8)
PLATELET # BLD AUTO: 246 K/UL (ref 150–450)
PMV BLD AUTO: 10.1 FL (ref 9.2–12.9)
POTASSIUM SERPL-SCNC: 4.7 MMOL/L (ref 3.5–5.1)
PROT SERPL-MCNC: 7.6 G/DL (ref 6–8.4)
PROT UR QL STRIP: NEGATIVE
RBC # BLD AUTO: 4.28 M/UL (ref 4.6–6.2)
SATURATED IRON: 27 % (ref 20–50)
SODIUM SERPL-SCNC: 140 MMOL/L (ref 136–145)
SP GR UR STRIP: 1.02 (ref 1–1.03)
T4 FREE SERPL-MCNC: 1.19 NG/DL (ref 0.71–1.51)
TOTAL IRON BINDING CAPACITY: 408 UG/DL (ref 250–450)
TRANSFERRIN SERPL-MCNC: 276 MG/DL (ref 200–375)
TRIGL SERPL-MCNC: 130 MG/DL (ref 30–150)
TSH SERPL DL<=0.005 MIU/L-ACNC: 2.99 UIU/ML (ref 0.4–4)
URN SPEC COLLECT METH UR: ABNORMAL
UROBILINOGEN UR STRIP-ACNC: NEGATIVE EU/DL
WBC # BLD AUTO: 8.3 K/UL (ref 3.9–12.7)

## 2022-06-06 PROCEDURE — 84466 ASSAY OF TRANSFERRIN: CPT | Performed by: FAMILY MEDICINE

## 2022-06-06 PROCEDURE — 84439 ASSAY OF FREE THYROXINE: CPT | Performed by: FAMILY MEDICINE

## 2022-06-06 PROCEDURE — 85025 COMPLETE CBC W/AUTO DIFF WBC: CPT | Performed by: FAMILY MEDICINE

## 2022-06-06 PROCEDURE — 80061 LIPID PANEL: CPT | Performed by: FAMILY MEDICINE

## 2022-06-06 PROCEDURE — 80053 COMPREHEN METABOLIC PANEL: CPT | Performed by: FAMILY MEDICINE

## 2022-06-06 PROCEDURE — 83036 HEMOGLOBIN GLYCOSYLATED A1C: CPT | Performed by: FAMILY MEDICINE

## 2022-06-06 PROCEDURE — 81003 URINALYSIS AUTO W/O SCOPE: CPT | Performed by: FAMILY MEDICINE

## 2022-06-06 PROCEDURE — 82728 ASSAY OF FERRITIN: CPT | Performed by: FAMILY MEDICINE

## 2022-06-06 PROCEDURE — 84443 ASSAY THYROID STIM HORMONE: CPT | Performed by: FAMILY MEDICINE

## 2022-06-06 PROCEDURE — 82607 VITAMIN B-12: CPT | Performed by: FAMILY MEDICINE

## 2022-06-06 NOTE — PROGRESS NOTES
Venipuncture performed with 21 gauge butterfly, x's 3 attempt, to R Antecubital vein.     Specimens collected per orders.     Pressure dressing applied to site, instructed patient to remove dressing in 10-15 minutes, OK to re-adjust dressing if pressure causing any discomfort, to observe closely for numbness and/or discoloration to hand or fingers, and to notify provider if bleeding persists after applying constant pressure lasting 30 minutes.

## 2022-06-07 LAB
FERRITIN SERPL-MCNC: 67 NG/ML (ref 20–300)
VIT B12 SERPL-MCNC: 286 PG/ML (ref 210–950)

## 2022-06-13 ENCOUNTER — OFFICE VISIT (OUTPATIENT)
Dept: FAMILY MEDICINE | Facility: CLINIC | Age: 82
End: 2022-06-13
Payer: MEDICARE

## 2022-06-13 ENCOUNTER — PATIENT OUTREACH (OUTPATIENT)
Dept: ADMINISTRATIVE | Facility: HOSPITAL | Age: 82
End: 2022-06-13
Payer: MEDICARE

## 2022-06-13 VITALS
DIASTOLIC BLOOD PRESSURE: 63 MMHG | HEIGHT: 68 IN | OXYGEN SATURATION: 96 % | WEIGHT: 166.75 LBS | HEART RATE: 65 BPM | SYSTOLIC BLOOD PRESSURE: 142 MMHG | BODY MASS INDEX: 25.27 KG/M2

## 2022-06-13 DIAGNOSIS — Z86.69 HISTORY OF GLAUCOMA: ICD-10-CM

## 2022-06-13 DIAGNOSIS — Z87.19 HISTORY OF UMBILICAL HERNIA REPAIR: ICD-10-CM

## 2022-06-13 DIAGNOSIS — R31.9 HEMATURIA, UNSPECIFIED TYPE: ICD-10-CM

## 2022-06-13 DIAGNOSIS — E03.9 ACQUIRED HYPOTHYROIDISM: ICD-10-CM

## 2022-06-13 DIAGNOSIS — E78.2 MIXED HYPERLIPIDEMIA: ICD-10-CM

## 2022-06-13 DIAGNOSIS — Z98.890 HISTORY OF UMBILICAL HERNIA REPAIR: ICD-10-CM

## 2022-06-13 DIAGNOSIS — J30.1 NON-SEASONAL ALLERGIC RHINITIS DUE TO POLLEN: ICD-10-CM

## 2022-06-13 DIAGNOSIS — N18.32 STAGE 3B CHRONIC KIDNEY DISEASE: ICD-10-CM

## 2022-06-13 DIAGNOSIS — I10 PRIMARY HYPERTENSION: ICD-10-CM

## 2022-06-13 DIAGNOSIS — Z00.00 ENCOUNTER FOR SUBSEQUENT ANNUAL WELLNESS VISIT (AWV) IN MEDICARE PATIENT: Primary | ICD-10-CM

## 2022-06-13 DIAGNOSIS — Z12.11 SCREEN FOR COLON CANCER: ICD-10-CM

## 2022-06-13 PROBLEM — J34.2 DEVIATED SEPTUM: Status: ACTIVE | Noted: 2022-06-13

## 2022-06-13 LAB
BILIRUB SERPL-MCNC: NEGATIVE MG/DL
BLOOD URINE, POC: NEGATIVE
CLARITY, POC UA: CLEAR
COLOR, POC UA: YELLOW
GLUCOSE UR QL STRIP: NEGATIVE
KETONES UR QL STRIP: NEGATIVE
LEUKOCYTE ESTERASE URINE, POC: NEGATIVE
NITRITE, POC UA: NEGATIVE
PH, POC UA: 5.5
PROTEIN, POC: NEGATIVE
SPECIFIC GRAVITY, POC UA: 1.02
UROBILINOGEN, POC UA: 0.2

## 2022-06-13 PROCEDURE — 1101F PR PT FALLS ASSESS DOC 0-1 FALLS W/OUT INJ PAST YR: ICD-10-PCS | Mod: CPTII,S$GLB,, | Performed by: FAMILY MEDICINE

## 2022-06-13 PROCEDURE — 1126F PR PAIN SEVERITY QUANTIFIED, NO PAIN PRESENT: ICD-10-PCS | Mod: CPTII,S$GLB,, | Performed by: FAMILY MEDICINE

## 2022-06-13 PROCEDURE — 1160F PR REVIEW ALL MEDS BY PRESCRIBER/CLIN PHARMACIST DOCUMENTED: ICD-10-PCS | Mod: CPTII,S$GLB,, | Performed by: FAMILY MEDICINE

## 2022-06-13 PROCEDURE — 1101F PT FALLS ASSESS-DOCD LE1/YR: CPT | Mod: CPTII,S$GLB,, | Performed by: FAMILY MEDICINE

## 2022-06-13 PROCEDURE — 1126F AMNT PAIN NOTED NONE PRSNT: CPT | Mod: CPTII,S$GLB,, | Performed by: FAMILY MEDICINE

## 2022-06-13 PROCEDURE — 81002 URINALYSIS NONAUTO W/O SCOPE: CPT | Mod: S$GLB,,, | Performed by: FAMILY MEDICINE

## 2022-06-13 PROCEDURE — 1159F PR MEDICATION LIST DOCUMENTED IN MEDICAL RECORD: ICD-10-PCS | Mod: CPTII,S$GLB,, | Performed by: FAMILY MEDICINE

## 2022-06-13 PROCEDURE — 1159F MED LIST DOCD IN RCRD: CPT | Mod: CPTII,S$GLB,, | Performed by: FAMILY MEDICINE

## 2022-06-13 PROCEDURE — 99999 PR PBB SHADOW E&M-EST. PATIENT-LVL IV: ICD-10-PCS | Mod: PBBFAC,,, | Performed by: FAMILY MEDICINE

## 2022-06-13 PROCEDURE — 3078F DIAST BP <80 MM HG: CPT | Mod: CPTII,S$GLB,, | Performed by: FAMILY MEDICINE

## 2022-06-13 PROCEDURE — 3288F FALL RISK ASSESSMENT DOCD: CPT | Mod: CPTII,S$GLB,, | Performed by: FAMILY MEDICINE

## 2022-06-13 PROCEDURE — 99397 PER PM REEVAL EST PAT 65+ YR: CPT | Mod: S$GLB,,, | Performed by: FAMILY MEDICINE

## 2022-06-13 PROCEDURE — 1160F RVW MEDS BY RX/DR IN RCRD: CPT | Mod: CPTII,S$GLB,, | Performed by: FAMILY MEDICINE

## 2022-06-13 PROCEDURE — 3078F PR MOST RECENT DIASTOLIC BLOOD PRESSURE < 80 MM HG: ICD-10-PCS | Mod: CPTII,S$GLB,, | Performed by: FAMILY MEDICINE

## 2022-06-13 PROCEDURE — 3288F PR FALLS RISK ASSESSMENT DOCUMENTED: ICD-10-PCS | Mod: CPTII,S$GLB,, | Performed by: FAMILY MEDICINE

## 2022-06-13 PROCEDURE — 99397 PR PREVENTIVE VISIT,EST,65 & OVER: ICD-10-PCS | Mod: S$GLB,,, | Performed by: FAMILY MEDICINE

## 2022-06-13 PROCEDURE — 1157F PR ADVANCE CARE PLAN OR EQUIV PRESENT IN MEDICAL RECORD: ICD-10-PCS | Mod: CPTII,S$GLB,, | Performed by: FAMILY MEDICINE

## 2022-06-13 PROCEDURE — 99999 PR PBB SHADOW E&M-EST. PATIENT-LVL IV: CPT | Mod: PBBFAC,,, | Performed by: FAMILY MEDICINE

## 2022-06-13 PROCEDURE — 3077F SYST BP >= 140 MM HG: CPT | Mod: CPTII,S$GLB,, | Performed by: FAMILY MEDICINE

## 2022-06-13 PROCEDURE — 81002 POCT URINE DIPSTICK WITHOUT MICROSCOPE: ICD-10-PCS | Mod: S$GLB,,, | Performed by: FAMILY MEDICINE

## 2022-06-13 PROCEDURE — 3077F PR MOST RECENT SYSTOLIC BLOOD PRESSURE >= 140 MM HG: ICD-10-PCS | Mod: CPTII,S$GLB,, | Performed by: FAMILY MEDICINE

## 2022-06-13 PROCEDURE — 1157F ADVNC CARE PLAN IN RCRD: CPT | Mod: CPTII,S$GLB,, | Performed by: FAMILY MEDICINE

## 2022-06-13 NOTE — PROGRESS NOTES
Population Health Outreach.     The following record(s)  below were uploaded for Health Maintenance .    MAMMOGRAM SCREENING            PAP SMEAR  HPV SCREENING   CHLAMYDIA SCREENING    MAMMOGRAM SCREENING    DEXA SCREENING           HEMOGLOBIN A1c  LIPID PANEL  URINE MICROALBUMIN  COMPLETE METABOLIC PANEL  DM FOOT EXAM  DM EYE EXAM    COLONOSCOPY       FIT  KIT  COLOGUARD    HEP C SCREENING  HIV SCREENING    ABDOMINAL AORTA ANEURYSM SCREENING (CT OF ABDOMEN)  LOW DOSE CT SCREENING (CXR)     (X) IMMUNIZATIONS

## 2022-06-13 NOTE — Clinical Note
Pt had both Covid boosters at [facility] pharmacy near his home. Do we have access to pull from their records and update his chart?

## 2022-06-13 NOTE — PROGRESS NOTES
Laura Be,  Your urine looked perfect this morning. No worries about that little bit of blood in the last specimen.  Take care,  Dr. Hatch

## 2022-06-13 NOTE — PROGRESS NOTES
CBC:   Lab Results   Component Value Date    WBC 8.30 06/06/2022    HGB 13.5 (L) 06/06/2022    HCT 39.8 (L) 06/06/2022     06/06/2022    MCV 93 06/06/2022    RDW 12.6 06/06/2022     CMP:   Lab Results   Component Value Date     06/06/2022    K 4.7 06/06/2022     06/06/2022    CO2 23 06/06/2022    BUN 23 06/06/2022    CREATININE 1.5 (H) 06/06/2022     06/06/2022    CALCIUM 9.6 06/06/2022    ALKPHOS 69 06/06/2022    PROT 7.6 06/06/2022    ALBUMIN 3.8 06/06/2022    BILITOT 0.9 06/06/2022    AST 17 06/06/2022    ALT 21 06/06/2022     LIPIDS:   Lab Results   Component Value Date    CHOL 193 06/06/2022    HDL 50 06/06/2022    LDLCALC 117.0 06/06/2022    TRIG 130 06/06/2022    CHOLHDL 25.9 06/06/2022     HA1C:   Lab Results   Component Value Date    HGBA1C 5.3 06/06/2022     TSH:   Lab Results   Component Value Date    TSH 2.991 06/06/2022     UA:   Specimen UA  Urine, Clean Catch  Urine, Clean Catch    Color, UA Yellow, Straw, Blanca Yellow  Yellow    Appearance, UA Clear Clear  Clear    pH, UA 5.0 - 8.0 6.0  7.0    Specific Gravity, UA 1.005 - 1.030 1.025  1.010    Protein, UA Negative Negative  Negative CM    Comment: Recommend a 24 hour urine protein or a urine   protein/creatinine ratio if globulin induced proteinuria is   clinically suspected.    Glucose, UA Negative Negative  Negative    Ketones, UA Negative Negative  Negative    Bilirubin (UA) Negative Negative  Negative    Occult Blood UA Negative Trace Abnormal   Negative    Nitrite, UA Negative Negative  Negative    Urobilinogen, UA Negative EU/dL Negative  Negative    Leukocytes, UA Negative Negative  Negative        Subjective:       Patient ID: Mina Valentine is a 82 y.o. male.    Chief Complaint: Annual Exam    He is up to date on all vaccines except Shingrix. Got both Covid boosters at local pharmacy ([facility] near his home.)  He is doing well, no complaints today.   Recent labs reviewed in detail with patient and all questions  "answered to his understanding.    Mina Valentine presented for a follow-up Medicare AWV today. The following components were reviewed and updated:    Medical history  Family History  Social history  Allergies and Current Medications  Health Risk Assessment  Health Maintenance  Care Team    **See Completed Assessments for Annual Wellness visit with in the encounter summary    The following assessments were completed:  Depression Screening  Cognitive function Screening  Timed Get Up Test  Whisper Test    ----------------------------------                   06/13/22                             0738            ----------------------------------   BP:            (!) 142/63          BP Location:       Right arm           Patient Position:        Sitting            BP Method:   Medium (Automatic)      Pulse:             65              SpO2:             96%              Weight: 75.7 kg (166 lb 12.5 oz)   Height:     5' 8" (1.727 m)       ----------------------------------  Body mass index is 25.36 kg/m².   )        Diagnoses and health risks identified today and associated recommendations/orders:  Encounter for subsequent annual wellness visit (AWV) in Medicare patient    Primary hypertension  -     Renal Function Panel; Future; Expected date: 12/13/2022    Acquired hypothyroidism    Stage 3b chronic kidney disease    Non-seasonal allergic rhinitis due to pollen    Mixed hyperlipidemia    Hematuria, unspecified type  -     POCT URINE DIPSTICK WITHOUT MICROSCOPE  -     CBC Auto Differential; Future; Expected date: 06/13/2022    History of glaucoma    History of umbilical hernia repair    Screen for colon cancer  -     Cologuard Screening (Multitarget Stool DNA); Future; Expected date: 06/13/2022          Provided Mina with a 5-10 year written screening schedule and personal prevention plan. Recommendations were developed using the USPSTF age appropriate recommendations. Education, " counseling, and referrals were provided as needed.  After Visit Summary printed and given to patient which includes a list of additional screenings\tests needed.      I offered to discuss advanced care planning, including how to pick a person who would make decisions for you if you were unable to make them for yourself, called a health care power of , and what kind of decisions you might make such as use of life sustaining treatments such as ventilators and tube feeding when faced with a life limiting illness recorded on a living will that they will need to know. (How you want to be cared for as you near the end of your natural life)     X  Patient has advanced directives on file, which we reviewed, and they do not wish to make changes.    Depression Patient Health Questionnaire 2/24/2022 7/26/2021 5/18/2018 10/18/2016 3/31/2015   Over the last two weeks how often have you been bothered by little interest or pleasure in doing things 0 0 0 0 0   Over the last two weeks how often have you been bothered by feeling down, depressed or hopeless 0 0 0 0 0   PHQ-2 Total Score 0 0 0 0 0   Over the last two weeks how often have you been bothered by trouble falling or staying asleep, or sleeping too much - - - 0 0   Over the last two weeks how often have you been bothered by feeling tired or having little energy - - - 0 0   Over the last two weeks how often have you been bothered by a poor appetite or overeating - - - 0 0   Over the last two weeks how often have you been bothered by feeling bad about yourself - or that you are a failure or have let yourself or your family down - - - 0 0   Over the last two weeks how often have you been bothered by trouble concentrating on things, such as reading the newspaper or watching television - - - 0 0   Over the last two weeks how often have you been bothered by moving or speaking so slowly that other people could have noticed. Or the opposite - being so fidgety or restless that  you have been moving around a lot more than usual. - - - 0 0   Over the last two weeks how often have you been bothered by thoughts that you would be better off dead, or of hurting yourself - - - 0 0   If you checked off any problems, how difficult have these problems made it for you to do your work, take care of things at home or get along with other people? - - - Not difficult at all Not difficult at all   Total Score - - - 0 0       Review of Systems   Constitutional: Negative for activity change, appetite change, fatigue, fever and unexpected weight change.   HENT: Negative for congestion and sinus pressure.    Eyes: Negative for visual disturbance.   Respiratory: Negative for cough and shortness of breath.    Cardiovascular: Negative for chest pain and palpitations.   Gastrointestinal: Negative for abdominal pain, constipation, diarrhea, nausea and vomiting.   Endocrine: Negative for polydipsia, polyphagia and polyuria.   Genitourinary: Negative for difficulty urinating, dysuria, flank pain, frequency, hematuria and urgency.   Musculoskeletal: Negative for arthralgias, joint swelling and myalgias.   Skin: Negative for rash.   Allergic/Immunologic: Negative for environmental allergies.   Neurological: Negative for dizziness and headaches.   Hematological: Negative for adenopathy.   Psychiatric/Behavioral: Negative for sleep disturbance.   All other systems reviewed and are negative.        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 2009    EYE SURGERY Bilateral 2018    cataract surgery    hydrocele  1960    INGUINAL HERNIA REPAIR Right     PROSTATE SURGERY  2007    seed implant    UMBILICAL HERNIA REPAIR      belly button; had previous repair that failed, unsure how long ago     Family History   Problem Relation Age of Onset    Dementia Mother     Coronary artery disease Father 58         MI    Diabetes Father         type 1    Heart disease Father     Stroke Sister     Heart disease Sister         a fib    No Known Problems Sister     Cancer Brother 89        brain cancer    No Known Problems Daughter     No Known Problems Daughter     No Known Problems Son        Review of patient's allergies indicates:   Allergen Reactions    Penicillins Rash     Other reaction(s): Flushing (skin)       Current Outpatient Medications:     carvediloL (COREG) 3.125 MG tablet, Take 1 tablet (3.125 mg total) by mouth 2 (two) times daily with meals., Disp: 60 tablet, Rfl: 11    cloNIDine (CATAPRES) 0.1 MG tablet, Take 1 tablet (0.1 mg total) by mouth every 4 (four) hours as needed. Blood pressure over 180 on TOP (systolic)) or over 95 on BOTTOM (diastolic), Disp: 30 tablet, Rfl: 0    latanoprost 0.005 % ophthalmic solution, , Disp: , Rfl:     levothyroxine (SYNTHROID) 75 MCG tablet, TAKE 1 TABLET EVERY DAY ( NEED  APPOINTMENT AND LAB ), Disp: 90 tablet, Rfl: 3    NIFEdipine (PROCARDIA-XL) 60 MG (OSM) 24 hr tablet, TAKE 1 TABLET TWICE DAILY, Disp: 180 tablet, Rfl: 3    omega 3-dha-epa-fish oil 120-180-500 mg Cap, Take 1 capsule by mouth once daily at 6am., Disp: , Rfl:     pravastatin (PRAVACHOL) 40 MG tablet, TAKE 1 TABLET EVERY DAY, Disp: 90 tablet, Rfl: 3    valsartan (DIOVAN) 320 MG tablet, TAKE 1 TABLET EVERY MORNING, Disp: 90 tablet, Rfl: 1    fexofenadine (ALLEGRA) 180 MG tablet, Take 1 tablet (180 mg total) by mouth once daily.  "For allergies, Disp: 90 tablet, Rfl: 3  No current facility-administered medications for this visit.      Objective:      BP (!) 142/63 (BP Location: Right arm, Patient Position: Sitting, BP Method: Medium (Automatic))   Pulse 65   Ht 5' 8" (1.727 m)   Wt 75.7 kg (166 lb 12.5 oz)   SpO2 96%   BMI 25.36 kg/m²   Physical Exam  Vitals and nursing note reviewed.   Constitutional:       General: He is not in acute distress.     Appearance: Normal appearance. He is well-developed and normal weight. He is not ill-appearing, toxic-appearing or diaphoretic.   HENT:      Head: Normocephalic and atraumatic.      Right Ear: External ear normal.      Left Ear: External ear normal.      Nose: Nose normal.      Mouth/Throat:      Mouth: Mucous membranes are moist.      Pharynx: Oropharynx is clear. No oropharyngeal exudate or posterior oropharyngeal erythema.   Eyes:      General: No scleral icterus.        Right eye: No discharge.         Left eye: No discharge.      Extraocular Movements: Extraocular movements intact.      Conjunctiva/sclera: Conjunctivae normal.      Pupils: Pupils are equal, round, and reactive to light.   Neck:      Thyroid: No thyromegaly.      Vascular: No carotid bruit or JVD.      Trachea: No tracheal deviation.   Cardiovascular:      Rate and Rhythm: Normal rate and regular rhythm.      Pulses: Normal pulses.      Heart sounds: Normal heart sounds. No murmur heard.    No friction rub. No gallop.   Pulmonary:      Effort: Pulmonary effort is normal. No respiratory distress.      Breath sounds: Normal breath sounds. No stridor. No wheezing, rhonchi or rales.   Chest:      Chest wall: No tenderness.   Abdominal:      General: Abdomen is flat. Bowel sounds are normal. There is no distension.      Palpations: Abdomen is soft. There is no mass.      Tenderness: There is no abdominal tenderness. There is no right CVA tenderness, left CVA tenderness, guarding or rebound.      Hernia: No hernia is present. "   Musculoskeletal:         General: No deformity. Normal range of motion.      Cervical back: Normal range of motion and neck supple. No rigidity or tenderness. No muscular tenderness.      Right lower leg: No edema.      Left lower leg: No edema.   Lymphadenopathy:      Cervical: No cervical adenopathy.   Skin:     General: Skin is warm and dry.      Capillary Refill: Capillary refill takes less than 2 seconds.      Coloration: Skin is not jaundiced or pale.      Findings: No erythema or rash.   Neurological:      General: No focal deficit present.      Mental Status: He is alert and oriented to person, place, and time. Mental status is at baseline.      Motor: No weakness.      Coordination: Coordination normal.      Gait: Gait normal.   Psychiatric:         Mood and Affect: Mood normal.         Behavior: Behavior normal.         Thought Content: Thought content normal.         Judgment: Judgment normal.         Assessment:       1. Encounter for subsequent annual wellness visit (AWV) in Medicare patient    2. Primary hypertension    3. Acquired hypothyroidism    4. Stage 3b chronic kidney disease    5. Non-seasonal allergic rhinitis due to pollen    6. Mixed hyperlipidemia    7. Hematuria, unspecified type    8. History of glaucoma    9. History of umbilical hernia repair    10. Screen for colon cancer        Plan:       Encounter for subsequent annual wellness visit (AWV) in Medicare patient    Primary hypertension  -     Renal Function Panel; Future; Expected date: 12/13/2022    Acquired hypothyroidism    Stage 3b chronic kidney disease    Non-seasonal allergic rhinitis due to pollen    Mixed hyperlipidemia    Hematuria, unspecified type  -     POCT URINE DIPSTICK WITHOUT MICROSCOPE  -     CBC Auto Differential; Future; Expected date: 06/13/2022    History of glaucoma    History of umbilical hernia repair    Screen for colon cancer  -     Cologuard Screening (Multitarget Stool DNA); Future; Expected date:  06/13/2022    He is up-to-date on all of his screenings.  I am ordering a Cologuard to evaluate for colon cancer screening instead of the Hemoccult stool he provided last year.  Also, we will update his vaccine record with the COVID boosters he received previously.  He did get the Zostavax several years ago, but agrees to schedule the Shingrix vaccine at his local pharmacy at some point before the end of the year.    I am going to repeat the renal function panel in 6 months along with the CBC.  We will get a point of care urinalysis today to take a look given the previous trace hematuria.    He has an advanced directive on file.    Strict return precautions reviewed and patient verbalized understanding. Risks, benefits, and alternatives to the plan were reviewed in detail and all questions answered to the patient's satisfaction. Patient agrees to return to clinic or ER if symptoms worsen. 40 minutes total were spent on today's visit, not limited to but including time based on counseling and coordination of care.    Patient instructed that best way to communicate with my office staff is for patient to get on the [facility] patient portal to expedite communication and communication issues that may occur.  Patient was given instructions on how to get on the portal.  I encouraged patient to obtain portal access as well.  Ultimately it is up to the patient to obtain access.  Patient voiced understanding.    This note was created using [employer] voice recognition software that occasionally may misinterpret phrases or words.          Follow up in about 6 months (around 12/13/2022) for follow up HTN.

## 2022-06-22 LAB — NONINV COLON CA DNA+OCC BLD SCRN STL QL: NORMAL

## 2022-08-11 ENCOUNTER — PATIENT OUTREACH (OUTPATIENT)
Dept: ADMINISTRATIVE | Facility: HOSPITAL | Age: 82
End: 2022-08-11
Payer: MEDICARE

## 2022-08-11 NOTE — PROGRESS NOTES
Population Health Outreach.    Working [employer] report    Pt is enrolled in the Digital Medicine program and a controlled BP reading was recorded on 7/6/22 of 127/57.

## 2022-09-12 PROBLEM — Z00.00 ENCOUNTER FOR SUBSEQUENT ANNUAL WELLNESS VISIT (AWV) IN MEDICARE PATIENT: Status: RESOLVED | Noted: 2022-06-13 | Resolved: 2022-09-12

## 2022-10-31 DIAGNOSIS — E03.9 HYPOTHYROIDISM, UNSPECIFIED TYPE: ICD-10-CM

## 2022-11-01 RX ORDER — LEVOTHYROXINE SODIUM 75 UG/1
TABLET ORAL
Qty: 90 TABLET | Refills: 3 | Status: SHIPPED | OUTPATIENT
Start: 2022-11-01 | End: 2024-03-01

## 2022-12-06 ENCOUNTER — LAB VISIT (OUTPATIENT)
Dept: FAMILY MEDICINE | Facility: CLINIC | Age: 82
End: 2022-12-06
Payer: MEDICARE

## 2022-12-06 DIAGNOSIS — I10 PRIMARY HYPERTENSION: ICD-10-CM

## 2022-12-06 DIAGNOSIS — R31.9 HEMATURIA, UNSPECIFIED TYPE: ICD-10-CM

## 2022-12-06 LAB
ALBUMIN SERPL BCP-MCNC: 3.5 G/DL (ref 3.5–5.2)
ANION GAP SERPL CALC-SCNC: 11 MMOL/L (ref 8–16)
BASOPHILS # BLD AUTO: 0.04 K/UL (ref 0–0.2)
BASOPHILS NFR BLD: 0.5 % (ref 0–1.9)
BUN SERPL-MCNC: 25 MG/DL (ref 8–23)
CALCIUM SERPL-MCNC: 9.2 MG/DL (ref 8.7–10.5)
CHLORIDE SERPL-SCNC: 104 MMOL/L (ref 95–110)
CO2 SERPL-SCNC: 23 MMOL/L (ref 23–29)
CREAT SERPL-MCNC: 1.6 MG/DL (ref 0.5–1.4)
DIFFERENTIAL METHOD: ABNORMAL
EOSINOPHIL # BLD AUTO: 0.2 K/UL (ref 0–0.5)
EOSINOPHIL NFR BLD: 2.6 % (ref 0–8)
ERYTHROCYTE [DISTWIDTH] IN BLOOD BY AUTOMATED COUNT: 12 % (ref 11.5–14.5)
EST. GFR  (NO RACE VARIABLE): 43 ML/MIN/1.73 M^2
GLUCOSE SERPL-MCNC: 141 MG/DL (ref 70–110)
HCT VFR BLD AUTO: 39 % (ref 40–54)
HGB BLD-MCNC: 13.2 G/DL (ref 14–18)
IMM GRANULOCYTES # BLD AUTO: 0.06 K/UL (ref 0–0.04)
IMM GRANULOCYTES NFR BLD AUTO: 0.8 % (ref 0–0.5)
LYMPHOCYTES # BLD AUTO: 2.4 K/UL (ref 1–4.8)
LYMPHOCYTES NFR BLD: 33.1 % (ref 18–48)
MCH RBC QN AUTO: 31.2 PG (ref 27–31)
MCHC RBC AUTO-ENTMCNC: 33.8 G/DL (ref 32–36)
MCV RBC AUTO: 92 FL (ref 82–98)
MONOCYTES # BLD AUTO: 0.6 K/UL (ref 0.3–1)
MONOCYTES NFR BLD: 8.4 % (ref 4–15)
NEUTROPHILS # BLD AUTO: 4 K/UL (ref 1.8–7.7)
NEUTROPHILS NFR BLD: 54.6 % (ref 38–73)
NRBC BLD-RTO: 0 /100 WBC
PHOSPHATE SERPL-MCNC: 2.4 MG/DL (ref 2.7–4.5)
PLATELET # BLD AUTO: 239 K/UL (ref 150–450)
PMV BLD AUTO: 9.8 FL (ref 9.2–12.9)
POTASSIUM SERPL-SCNC: 4.5 MMOL/L (ref 3.5–5.1)
RBC # BLD AUTO: 4.23 M/UL (ref 4.6–6.2)
SODIUM SERPL-SCNC: 138 MMOL/L (ref 136–145)
WBC # BLD AUTO: 7.34 K/UL (ref 3.9–12.7)

## 2022-12-06 PROCEDURE — 85025 COMPLETE CBC W/AUTO DIFF WBC: CPT | Performed by: FAMILY MEDICINE

## 2022-12-06 PROCEDURE — 80069 RENAL FUNCTION PANEL: CPT | Performed by: FAMILY MEDICINE

## 2022-12-13 ENCOUNTER — OFFICE VISIT (OUTPATIENT)
Dept: FAMILY MEDICINE | Facility: CLINIC | Age: 82
End: 2022-12-13
Payer: MEDICARE

## 2022-12-13 VITALS
HEART RATE: 68 BPM | WEIGHT: 168.31 LBS | TEMPERATURE: 98 F | HEIGHT: 68 IN | BODY MASS INDEX: 25.51 KG/M2 | OXYGEN SATURATION: 98 % | DIASTOLIC BLOOD PRESSURE: 70 MMHG | SYSTOLIC BLOOD PRESSURE: 120 MMHG

## 2022-12-13 DIAGNOSIS — N18.32 STAGE 3B CHRONIC KIDNEY DISEASE: ICD-10-CM

## 2022-12-13 DIAGNOSIS — E03.9 ACQUIRED HYPOTHYROIDISM: ICD-10-CM

## 2022-12-13 DIAGNOSIS — I10 PRIMARY HYPERTENSION: Primary | ICD-10-CM

## 2022-12-13 PROCEDURE — 3288F FALL RISK ASSESSMENT DOCD: CPT | Mod: CPTII,S$GLB,, | Performed by: FAMILY MEDICINE

## 2022-12-13 PROCEDURE — 99999 PR PBB SHADOW E&M-EST. PATIENT-LVL III: ICD-10-PCS | Mod: PBBFAC,,, | Performed by: FAMILY MEDICINE

## 2022-12-13 PROCEDURE — 1101F PT FALLS ASSESS-DOCD LE1/YR: CPT | Mod: CPTII,S$GLB,, | Performed by: FAMILY MEDICINE

## 2022-12-13 PROCEDURE — 3074F PR MOST RECENT SYSTOLIC BLOOD PRESSURE < 130 MM HG: ICD-10-PCS | Mod: CPTII,S$GLB,, | Performed by: FAMILY MEDICINE

## 2022-12-13 PROCEDURE — 1101F PR PT FALLS ASSESS DOC 0-1 FALLS W/OUT INJ PAST YR: ICD-10-PCS | Mod: CPTII,S$GLB,, | Performed by: FAMILY MEDICINE

## 2022-12-13 PROCEDURE — 99214 PR OFFICE/OUTPT VISIT, EST, LEVL IV, 30-39 MIN: ICD-10-PCS | Mod: S$GLB,,, | Performed by: FAMILY MEDICINE

## 2022-12-13 PROCEDURE — 1160F PR REVIEW ALL MEDS BY PRESCRIBER/CLIN PHARMACIST DOCUMENTED: ICD-10-PCS | Mod: CPTII,S$GLB,, | Performed by: FAMILY MEDICINE

## 2022-12-13 PROCEDURE — 3078F PR MOST RECENT DIASTOLIC BLOOD PRESSURE < 80 MM HG: ICD-10-PCS | Mod: CPTII,S$GLB,, | Performed by: FAMILY MEDICINE

## 2022-12-13 PROCEDURE — 3288F PR FALLS RISK ASSESSMENT DOCUMENTED: ICD-10-PCS | Mod: CPTII,S$GLB,, | Performed by: FAMILY MEDICINE

## 2022-12-13 PROCEDURE — 99214 OFFICE O/P EST MOD 30 MIN: CPT | Mod: S$GLB,,, | Performed by: FAMILY MEDICINE

## 2022-12-13 PROCEDURE — 3078F DIAST BP <80 MM HG: CPT | Mod: CPTII,S$GLB,, | Performed by: FAMILY MEDICINE

## 2022-12-13 PROCEDURE — 1126F PR PAIN SEVERITY QUANTIFIED, NO PAIN PRESENT: ICD-10-PCS | Mod: CPTII,S$GLB,, | Performed by: FAMILY MEDICINE

## 2022-12-13 PROCEDURE — 3074F SYST BP LT 130 MM HG: CPT | Mod: CPTII,S$GLB,, | Performed by: FAMILY MEDICINE

## 2022-12-13 PROCEDURE — 1157F ADVNC CARE PLAN IN RCRD: CPT | Mod: CPTII,S$GLB,, | Performed by: FAMILY MEDICINE

## 2022-12-13 PROCEDURE — 99999 PR PBB SHADOW E&M-EST. PATIENT-LVL III: CPT | Mod: PBBFAC,,, | Performed by: FAMILY MEDICINE

## 2022-12-13 PROCEDURE — 1126F AMNT PAIN NOTED NONE PRSNT: CPT | Mod: CPTII,S$GLB,, | Performed by: FAMILY MEDICINE

## 2022-12-13 PROCEDURE — 1159F MED LIST DOCD IN RCRD: CPT | Mod: CPTII,S$GLB,, | Performed by: FAMILY MEDICINE

## 2022-12-13 PROCEDURE — 1159F PR MEDICATION LIST DOCUMENTED IN MEDICAL RECORD: ICD-10-PCS | Mod: CPTII,S$GLB,, | Performed by: FAMILY MEDICINE

## 2022-12-13 PROCEDURE — 1160F RVW MEDS BY RX/DR IN RCRD: CPT | Mod: CPTII,S$GLB,, | Performed by: FAMILY MEDICINE

## 2022-12-13 PROCEDURE — 1157F PR ADVANCE CARE PLAN OR EQUIV PRESENT IN MEDICAL RECORD: ICD-10-PCS | Mod: CPTII,S$GLB,, | Performed by: FAMILY MEDICINE

## 2022-12-13 NOTE — PROGRESS NOTES
Subjective:       Patient ID: Mina Valentine is a 82 y.o. male.    Chief Complaint: Follow-up    Here today for follow up.    HTN--In regards to the patient's hypertension, patient denies chest pain/sob, and reports compliance with medication regimen.    Hypothyroidism--In regards to their hypothyroidism, patient denies poor energy, skin and hair changes, as well as weight gain. Last TSH, free T3, free T4 noted will adjust as per orders.    Denies complaints.    Will be traveling to [place] for [place] with family, weather permitting.    Recent labs reviewed with patient and all questions answered to his satisfaction. Creatinine 1.6.    Depression Patient Health Questionnaire 2/24/2022 7/26/2021 5/18/2018 10/18/2016 3/31/2015   Over the last two weeks how often have you been bothered by little interest or pleasure in doing things Not at all Not at all Not at all Not at all Not at all   Over the last two weeks how often have you been bothered by feeling down, depressed or hopeless Not at all Not at all Not at all Not at all Not at all   PHQ-2 Total Score 0 0 0 0 0   Over the last two weeks how often have you been bothered by trouble falling or staying asleep, or sleeping too much - - - Not at all Not at all   Over the last two weeks how often have you been bothered by feeling tired or having little energy - - - Not at all Not at all   Over the last two weeks how often have you been bothered by a poor appetite or overeating - - - Not at all Not at all   Over the last two weeks how often have you been bothered by feeling bad about yourself - or that you are a failure or have let yourself or your family down - - - Not at all Not at all   Over the last two weeks how often have you been bothered by trouble concentrating on things, such as reading the newspaper or watching television - - - Not at all Not at all   Over the last two weeks how often have you been bothered by moving or speaking so slowly that other  people could have noticed. Or the opposite - being so fidgety or restless that you have been moving around a lot more than usual. - - - Not at all Not at all   Over the last two weeks how often have you been bothered by thoughts that you would be better off dead, or of hurting yourself - - - Not at all Not at all   If you checked off any problems, how difficult have these problems made it for you to do your work, take care of things at home or get along with other people? - - - Not difficult at all Not difficult at all   Total Score - - - 0 0       Review of Systems   All other systems reviewed and are negative.      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 2009    EYE SURGERY Bilateral 2018    cataract surgery    hydrocele  1960    INGUINAL HERNIA REPAIR Right     PROSTATE SURGERY  2007    seed implant    UMBILICAL HERNIA REPAIR  2004    belly button; had previous repair that failed, unsure how long ago     Family History   Problem Relation Age of Onset    Dementia Mother     Coronary artery disease Father 58         MI    Diabetes Father         type 1    Heart disease Father     Stroke Sister     Heart disease Sister         a fib    No Known Problems Sister     Cancer Brother         brain cancer    No Known Problems Daughter     No Known Problems Daughter     No Known Problems Son        Review of patient's allergies indicates:   Allergen  "Reactions    Penicillins Rash     Other reaction(s): Flushing (skin)       Current Outpatient Medications:     carvediloL (COREG) 3.125 MG tablet, Take 1 tablet (3.125 mg total) by mouth 2 (two) times daily with meals., Disp: 60 tablet, Rfl: 11    cloNIDine (CATAPRES) 0.1 MG tablet, Take 1 tablet (0.1 mg total) by mouth every 4 (four) hours as needed. Blood pressure over 180 on TOP (systolic)) or over 95 on BOTTOM (diastolic), Disp: 30 tablet, Rfl: 0    latanoprost 0.005 % ophthalmic solution, , Disp: , Rfl:     levothyroxine (SYNTHROID) 75 MCG tablet, TAKE 1 TABLET EVERY DAY ( NEED  APPOINTMENT AND LAB ), Disp: 90 tablet, Rfl: 3    NIFEdipine (PROCARDIA-XL) 60 MG (OSM) 24 hr tablet, TAKE 1 TABLET TWICE DAILY, Disp: 180 tablet, Rfl: 3    omega 3-dha-epa-fish oil 120-180-500 mg Cap, Take 1 capsule by mouth once daily at 6am., Disp: , Rfl:     pravastatin (PRAVACHOL) 40 MG tablet, TAKE 1 TABLET EVERY DAY, Disp: 90 tablet, Rfl: 3    valsartan (DIOVAN) 320 MG tablet, TAKE 1 TABLET EVERY MORNING, Disp: 90 tablet, Rfl: 1    fexofenadine (ALLEGRA) 180 MG tablet, Take 1 tablet (180 mg total) by mouth once daily. For allergies, Disp: 90 tablet, Rfl: 3      Objective:      /70 (BP Location: Left arm, Patient Position: Sitting, BP Method: Medium (Manual))   Pulse 68   Temp 97.8 °F (36.6 °C) (Tympanic)   Ht 5' 8" (1.727 m)   Wt 76.4 kg (168 lb 5.1 oz)   SpO2 98%   BMI 25.59 kg/m²   Physical Exam  Vitals and nursing note reviewed.   Constitutional:       General: He is not in acute distress.     Appearance: Normal appearance. He is well-developed and normal weight. He is not ill-appearing, toxic-appearing or diaphoretic.   HENT:      Head: Normocephalic and atraumatic.      Right Ear: External ear normal.      Left Ear: External ear normal.   Eyes:      General: No scleral icterus.        Right eye: No discharge.         Left eye: No discharge.      Conjunctiva/sclera: Conjunctivae normal.   Neck:      Thyroid: No " thyromegaly.      Vascular: No JVD.      Trachea: No tracheal deviation.   Cardiovascular:      Rate and Rhythm: Normal rate and regular rhythm.      Pulses: Normal pulses.      Heart sounds: Normal heart sounds. No murmur heard.    No friction rub.   Pulmonary:      Effort: Pulmonary effort is normal. No respiratory distress.      Breath sounds: Normal breath sounds. No wheezing.   Abdominal:      General: Abdomen is flat. There is no distension.   Musculoskeletal:         General: No deformity.      Cervical back: Neck supple. No rigidity. No muscular tenderness.      Right lower leg: No edema.      Left lower leg: No edema.   Skin:     General: Skin is warm and dry.      Capillary Refill: Capillary refill takes less than 2 seconds.      Coloration: Skin is not jaundiced or pale.      Findings: No erythema or rash.   Neurological:      General: No focal deficit present.      Mental Status: He is alert and oriented to person, place, and time. Mental status is at baseline.      Motor: No weakness.      Coordination: Coordination normal.      Gait: Gait normal.   Psychiatric:         Mood and Affect: Mood normal.         Behavior: Behavior normal.         Thought Content: Thought content normal.         Judgment: Judgment normal.       Assessment:       1. Primary hypertension    2. Acquired hypothyroidism    3. Stage 3b chronic kidney disease        Plan:       Primary hypertension  -     Renal Function Panel; Future; Expected date: 12/13/2022  -     Urinalysis; Future; Expected date: 12/13/2022    Acquired hypothyroidism    Stage 3b chronic kidney disease  -     Renal Function Panel; Future; Expected date: 12/13/2022  -     Urinalysis; Future; Expected date: 12/13/2022    Continue current medications.  Repeat renal panel and UA in approx 3 months.  Follow up after labs.        Previous records in Epic were reviewed, including the last 3 months of encounters, imaging, laboratory, and pathology reports.    Strict  return precautions reviewed and patient verbalized understanding. Risks, benefits, and alternatives to the plan were reviewed in detail and all questions answered to the patient's satisfaction. Patient agrees to return to clinic or ER if symptoms worsen. 30 minutes total were spent on today's visit, not limited to but including time based on counseling and coordination of care.    Patient instructed that best way to communicate with my office staff is for patient to get on the [facility] epic patient portal to expedite communication and communication issues that may occur.  Patient was given instructions on how to get on the portal.  I encouraged patient to obtain portal access as well.  Ultimately it is up to the patient to obtain access.  Patient voiced understanding.    This note was created using [employer] voice recognition software that occasionally may misinterpret phrases or words.    Follow up in about 3 months (around 3/13/2023) for follow up HTN, CKD.

## 2023-03-28 ENCOUNTER — PATIENT MESSAGE (OUTPATIENT)
Dept: LAB | Facility: HOSPITAL | Age: 83
End: 2023-03-28
Payer: MEDICARE

## 2023-03-29 ENCOUNTER — LAB VISIT (OUTPATIENT)
Dept: LAB | Facility: HOSPITAL | Age: 83
End: 2023-03-29
Payer: MEDICARE

## 2023-03-29 DIAGNOSIS — N18.32 STAGE 3B CHRONIC KIDNEY DISEASE: ICD-10-CM

## 2023-03-29 DIAGNOSIS — I10 PRIMARY HYPERTENSION: ICD-10-CM

## 2023-03-29 LAB
ALBUMIN SERPL BCP-MCNC: 3.6 G/DL (ref 3.5–5.2)
ANION GAP SERPL CALC-SCNC: 11 MMOL/L (ref 8–16)
BILIRUB UR QL STRIP: NEGATIVE
BUN SERPL-MCNC: 23 MG/DL (ref 8–23)
CALCIUM SERPL-MCNC: 9.4 MG/DL (ref 8.7–10.5)
CHLORIDE SERPL-SCNC: 108 MMOL/L (ref 95–110)
CLARITY UR: ABNORMAL
CO2 SERPL-SCNC: 21 MMOL/L (ref 23–29)
COLOR UR: YELLOW
CREAT SERPL-MCNC: 1.5 MG/DL (ref 0.5–1.4)
EST. GFR  (NO RACE VARIABLE): 46.2 ML/MIN/1.73 M^2
GLUCOSE SERPL-MCNC: 97 MG/DL (ref 70–110)
GLUCOSE UR QL STRIP: NEGATIVE
HGB UR QL STRIP: NEGATIVE
KETONES UR QL STRIP: NEGATIVE
LEUKOCYTE ESTERASE UR QL STRIP: NEGATIVE
NITRITE UR QL STRIP: NEGATIVE
PH UR STRIP: 6 [PH] (ref 5–8)
PHOSPHATE SERPL-MCNC: 2.4 MG/DL (ref 2.7–4.5)
POTASSIUM SERPL-SCNC: 4.5 MMOL/L (ref 3.5–5.1)
PROT UR QL STRIP: ABNORMAL
SODIUM SERPL-SCNC: 140 MMOL/L (ref 136–145)
SP GR UR STRIP: >=1.03 (ref 1–1.03)
URN SPEC COLLECT METH UR: ABNORMAL
UROBILINOGEN UR STRIP-ACNC: NEGATIVE EU/DL

## 2023-03-29 PROCEDURE — 36415 COLL VENOUS BLD VENIPUNCTURE: CPT | Performed by: FAMILY MEDICINE

## 2023-03-29 PROCEDURE — 80069 RENAL FUNCTION PANEL: CPT | Performed by: FAMILY MEDICINE

## 2023-03-29 PROCEDURE — 81003 URINALYSIS AUTO W/O SCOPE: CPT | Performed by: FAMILY MEDICINE

## 2023-04-05 ENCOUNTER — OFFICE VISIT (OUTPATIENT)
Dept: FAMILY MEDICINE | Facility: CLINIC | Age: 83
End: 2023-04-05
Payer: MEDICARE

## 2023-04-05 VITALS
HEIGHT: 68 IN | TEMPERATURE: 99 F | OXYGEN SATURATION: 98 % | HEART RATE: 68 BPM | WEIGHT: 167.88 LBS | SYSTOLIC BLOOD PRESSURE: 130 MMHG | BODY MASS INDEX: 25.44 KG/M2 | DIASTOLIC BLOOD PRESSURE: 62 MMHG

## 2023-04-05 DIAGNOSIS — E03.9 ACQUIRED HYPOTHYROIDISM: ICD-10-CM

## 2023-04-05 DIAGNOSIS — I10 PRIMARY HYPERTENSION: Primary | ICD-10-CM

## 2023-04-05 DIAGNOSIS — R73.9 HYPERGLYCEMIA: ICD-10-CM

## 2023-04-05 DIAGNOSIS — E78.2 MIXED HYPERLIPIDEMIA: ICD-10-CM

## 2023-04-05 DIAGNOSIS — N18.32 STAGE 3B CHRONIC KIDNEY DISEASE: ICD-10-CM

## 2023-04-05 PROCEDURE — 1160F PR REVIEW ALL MEDS BY PRESCRIBER/CLIN PHARMACIST DOCUMENTED: ICD-10-PCS | Mod: CPTII,S$GLB,, | Performed by: FAMILY MEDICINE

## 2023-04-05 PROCEDURE — 1160F RVW MEDS BY RX/DR IN RCRD: CPT | Mod: CPTII,S$GLB,, | Performed by: FAMILY MEDICINE

## 2023-04-05 PROCEDURE — 1126F PR PAIN SEVERITY QUANTIFIED, NO PAIN PRESENT: ICD-10-PCS | Mod: CPTII,S$GLB,, | Performed by: FAMILY MEDICINE

## 2023-04-05 PROCEDURE — 99214 PR OFFICE/OUTPT VISIT, EST, LEVL IV, 30-39 MIN: ICD-10-PCS | Mod: S$GLB,,, | Performed by: FAMILY MEDICINE

## 2023-04-05 PROCEDURE — 3078F PR MOST RECENT DIASTOLIC BLOOD PRESSURE < 80 MM HG: ICD-10-PCS | Mod: CPTII,S$GLB,, | Performed by: FAMILY MEDICINE

## 2023-04-05 PROCEDURE — 3078F DIAST BP <80 MM HG: CPT | Mod: CPTII,S$GLB,, | Performed by: FAMILY MEDICINE

## 2023-04-05 PROCEDURE — 99999 PR PBB SHADOW E&M-EST. PATIENT-LVL IV: ICD-10-PCS | Mod: PBBFAC,,, | Performed by: FAMILY MEDICINE

## 2023-04-05 PROCEDURE — 1159F PR MEDICATION LIST DOCUMENTED IN MEDICAL RECORD: ICD-10-PCS | Mod: CPTII,S$GLB,, | Performed by: FAMILY MEDICINE

## 2023-04-05 PROCEDURE — 1126F AMNT PAIN NOTED NONE PRSNT: CPT | Mod: CPTII,S$GLB,, | Performed by: FAMILY MEDICINE

## 2023-04-05 PROCEDURE — 99999 PR PBB SHADOW E&M-EST. PATIENT-LVL IV: CPT | Mod: PBBFAC,,, | Performed by: FAMILY MEDICINE

## 2023-04-05 PROCEDURE — 1157F ADVNC CARE PLAN IN RCRD: CPT | Mod: CPTII,S$GLB,, | Performed by: FAMILY MEDICINE

## 2023-04-05 PROCEDURE — 99214 OFFICE O/P EST MOD 30 MIN: CPT | Mod: S$GLB,,, | Performed by: FAMILY MEDICINE

## 2023-04-05 PROCEDURE — 1159F MED LIST DOCD IN RCRD: CPT | Mod: CPTII,S$GLB,, | Performed by: FAMILY MEDICINE

## 2023-04-05 PROCEDURE — 1101F PR PT FALLS ASSESS DOC 0-1 FALLS W/OUT INJ PAST YR: ICD-10-PCS | Mod: CPTII,S$GLB,, | Performed by: FAMILY MEDICINE

## 2023-04-05 PROCEDURE — 1101F PT FALLS ASSESS-DOCD LE1/YR: CPT | Mod: CPTII,S$GLB,, | Performed by: FAMILY MEDICINE

## 2023-04-05 PROCEDURE — 3075F SYST BP GE 130 - 139MM HG: CPT | Mod: CPTII,S$GLB,, | Performed by: FAMILY MEDICINE

## 2023-04-05 PROCEDURE — 3075F PR MOST RECENT SYSTOLIC BLOOD PRESS GE 130-139MM HG: ICD-10-PCS | Mod: CPTII,S$GLB,, | Performed by: FAMILY MEDICINE

## 2023-04-05 PROCEDURE — 1157F PR ADVANCE CARE PLAN OR EQUIV PRESENT IN MEDICAL RECORD: ICD-10-PCS | Mod: CPTII,S$GLB,, | Performed by: FAMILY MEDICINE

## 2023-04-05 PROCEDURE — 3288F FALL RISK ASSESSMENT DOCD: CPT | Mod: CPTII,S$GLB,, | Performed by: FAMILY MEDICINE

## 2023-04-05 PROCEDURE — 3288F PR FALLS RISK ASSESSMENT DOCUMENTED: ICD-10-PCS | Mod: CPTII,S$GLB,, | Performed by: FAMILY MEDICINE

## 2023-04-05 NOTE — PROGRESS NOTES
Labs/Tests:  CBC:  Lab Results   Component Value Date    WBC 7.34 12/06/2022    RBC 4.23 (L) 12/06/2022    HGB 13.2 (L) 12/06/2022    HCT 39.0 (L) 12/06/2022    MCV 92 12/06/2022    MCH 31.2 (H) 12/06/2022    MCHC 33.8 12/06/2022    RDW 12.0 12/06/2022     12/06/2022    MPV 9.8 12/06/2022    GRAN 4.0 12/06/2022    GRAN 54.6 12/06/2022    LYMPH 2.4 12/06/2022    LYMPH 33.1 12/06/2022    MONO 0.6 12/06/2022    MONO 8.4 12/06/2022    EOS 0.2 12/06/2022    BASO 0.04 12/06/2022    EOSINOPHIL 2.6 12/06/2022    BASOPHIL 0.5 12/06/2022    DIFFMETHOD Automated 12/06/2022     CMP:  Lab Results   Component Value Date     03/29/2023    K 4.5 03/29/2023     03/29/2023    CO2 21 (L) 03/29/2023    BUN 23 03/29/2023    CREATININE 1.5 (H) 03/29/2023    GLU 97 03/29/2023    CALCIUM 9.4 03/29/2023    PHOS 2.4 (L) 03/29/2023    ALKPHOS 69 06/06/2022    PROT 7.6 06/06/2022    ALBUMIN 3.6 03/29/2023    BILITOT 0.9 06/06/2022    AST 17 06/06/2022    ALT 21 06/06/2022    ANIONGAP 11 03/29/2023    EGFRNORACEVR 46.2 (A) 03/29/2023     HA1C:  Lab Results   Component Value Date    HGBA1C 5.3 06/06/2022    ESTIMATEDAVG 105 06/06/2022     LIPIDS:  Lab Results   Component Value Date    CHOL 193 06/06/2022    TRIG 130 06/06/2022    HDL 50 06/06/2022    LDLCALC 117.0 06/06/2022    CHOLHDL 25.9 06/06/2022    TOTALCHOLEST 3.9 06/06/2022    NONHDLCHOL 143 06/06/2022     Magnesium:  No results found for: MG  MicroAlbumin/Creatinine Ratio, Urine:  Lab Results   Component Value Date    LABMICR 26.0 06/23/2021    CREATRANDUR 231.0 06/23/2021    MICALBCREAT 11.3 06/23/2021     Iron / TIBC:  Lab Results   Component Value Date    IRON 112 06/06/2022    TRANSFERRIN 276 06/06/2022    TIBC 408 06/06/2022    FESATURATED 27 06/06/2022    FERRITIN 67 06/06/2022     TSH / T3 / T4:  Lab Results   Component Value Date    TSH 2.991 06/06/2022    FREET4 1.19 06/06/2022     Vit B / Vit D:  Lab Results   Component Value Date    XVCFJXQP68 286  06/06/2022    RVKXEJJW16BM 70 06/23/2021     UA:  Lab Results   Component Value Date    COLORU Yellow 03/29/2023    APPEARANCEUA Hazy (A) 03/29/2023    PHUR 6.0 03/29/2023    SPECGRAV >=1.030 (A) 03/29/2023    PROTEINUA Trace (A) 03/29/2023    GLUCUA Negative 03/29/2023    KETONESU Negative 03/29/2023    BILIRUBINUA Negative 03/29/2023    OCCULTUA Negative 03/29/2023    NITRITE Negative 03/29/2023    UROBILINOGEN Negative 03/29/2023    LEUKOCYTESUR Negative 03/29/2023     UA Reflex w/ Culture:  Lab Results   Component Value Date    LEUKOCYTESUR Negative 03/29/2023    UROBILINOGEN Negative 03/29/2023       Subjective:       Patient ID: Mina Valentine is a 83 y.o. male.    Chief Complaint: Follow-up    Follow up HTN, CKD, HLD, hyperglycemia.    Feels great.    BP has been looking good. Denies HA, CP, palpitations, SOB.    Followed by [facility].    Depression Patient Health Questionnaire 4/5/2023 2/24/2022 7/26/2021 5/18/2018 10/18/2016 3/31/2015   Over the last two weeks how often have you been bothered by little interest or pleasure in doing things Not at all Not at all Not at all Not at all Not at all Not at all   Over the last two weeks how often have you been bothered by feeling down, depressed or hopeless Not at all Not at all Not at all Not at all Not at all Not at all   PHQ-2 Total Score 0 0 0 0 0 0   Over the last two weeks how often have you been bothered by trouble falling or staying asleep, or sleeping too much - - - - Not at all Not at all   Over the last two weeks how often have you been bothered by feeling tired or having little energy - - - - Not at all Not at all   Over the last two weeks how often have you been bothered by a poor appetite or overeating - - - - Not at all Not at all   Over the last two weeks how often have you been bothered by feeling bad about yourself - or that you are a failure or have let yourself or your family down - - - - Not at all Not at all   Over the last two  weeks how often have you been bothered by trouble concentrating on things, such as reading the newspaper or watching television - - - - Not at all Not at all   Over the last two weeks how often have you been bothered by moving or speaking so slowly that other people could have noticed. Or the opposite - being so fidgety or restless that you have been moving around a lot more than usual. - - - - Not at all Not at all   Over the last two weeks how often have you been bothered by thoughts that you would be better off dead, or of hurting yourself - - - - Not at all Not at all   If you checked off any problems, how difficult have these problems made it for you to do your work, take care of things at home or get along with other people? - - - - Not difficult at all Not difficult at all   Total Score - - - - 0 0     No flowsheet data found.    Review of Systems   All other systems reviewed and are negative.      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 2009    EYE SURGERY Bilateral 2018    cataract surgery    hydrocele  1960    INGUINAL HERNIA REPAIR Right     PROSTATE SURGERY  2007    seed implant    UMBILICAL HERNIA REPAIR  2004    belly button; had previous repair that failed, unsure how long ago     Family History   Problem Relation Age of Onset    Dementia Mother     Coronary artery disease Father 58         MI    Diabetes Father         type 1    Heart  "disease Father     Stroke Sister     Heart disease Sister         a fib    No Known Problems Sister     Cancer Brother         brain cancer    No Known Problems Daughter     No Known Problems Daughter     No Known Problems Son        Review of patient's allergies indicates:   Allergen Reactions    Penicillins Rash     Other reaction(s): Flushing (skin)       Current Outpatient Medications:     carvediloL (COREG) 3.125 MG tablet, TAKE 1 TABLET TWICE DAILY WITH MEALS, Disp: 180 tablet, Rfl: 0    cloNIDine (CATAPRES) 0.1 MG tablet, Take 1 tablet (0.1 mg total) by mouth every 4 (four) hours as needed. Blood pressure over 180 on TOP (systolic)) or over 95 on BOTTOM (diastolic), Disp: 30 tablet, Rfl: 0    latanoprost 0.005 % ophthalmic solution, , Disp: , Rfl:     levothyroxine (SYNTHROID) 75 MCG tablet, TAKE 1 TABLET EVERY DAY ( NEED  APPOINTMENT AND LAB ), Disp: 90 tablet, Rfl: 3    NIFEdipine (PROCARDIA-XL) 60 MG (OSM) 24 hr tablet, TAKE 1 TABLET TWICE DAILY, Disp: 180 tablet, Rfl: 3    omega 3-dha-epa-fish oil 120-180-500 mg Cap, Take 1 capsule by mouth once daily at 6am., Disp: , Rfl:     pravastatin (PRAVACHOL) 40 MG tablet, TAKE 1 TABLET EVERY DAY, Disp: 90 tablet, Rfl: 3    valsartan (DIOVAN) 320 MG tablet, TAKE 1 TABLET EVERY MORNING, Disp: 90 tablet, Rfl: 1    fexofenadine (ALLEGRA) 180 MG tablet, Take 1 tablet (180 mg total) by mouth once daily. For allergies, Disp: 90 tablet, Rfl: 3      Objective:      /62 (BP Location: Left arm, Patient Position: Sitting, BP Method: Medium (Manual))   Pulse 68   Temp 98.7 °F (37.1 °C) (Tympanic)   Ht 5' 8" (1.727 m)   Wt 76.1 kg (167 lb 14.1 oz)   SpO2 98%   BMI 25.53 kg/m²   Physical Exam  Vitals and nursing note reviewed.   Constitutional:       General: He is not in acute distress.     Appearance: Normal appearance. He is well-developed and normal weight. He is not ill-appearing, toxic-appearing or diaphoretic.   HENT:      Head: Normocephalic and atraumatic. "      Right Ear: External ear normal.      Left Ear: External ear normal.   Eyes:      General: No scleral icterus.        Right eye: No discharge.         Left eye: No discharge.      Conjunctiva/sclera: Conjunctivae normal.   Neck:      Thyroid: No thyromegaly.      Vascular: No JVD.      Trachea: No tracheal deviation.   Cardiovascular:      Rate and Rhythm: Normal rate and regular rhythm.      Pulses: Normal pulses.      Heart sounds: Normal heart sounds. No murmur heard.    No friction rub.   Pulmonary:      Effort: Pulmonary effort is normal. No respiratory distress.      Breath sounds: Normal breath sounds. No wheezing.   Abdominal:      General: Abdomen is flat. There is no distension.   Musculoskeletal:         General: No deformity.      Cervical back: Neck supple. No rigidity. No muscular tenderness.      Right lower leg: No edema.      Left lower leg: No edema.   Skin:     General: Skin is warm and dry.      Capillary Refill: Capillary refill takes less than 2 seconds.      Coloration: Skin is not jaundiced or pale.      Findings: No erythema or rash.      Comments: Healing R nose after MOHs.   Neurological:      General: No focal deficit present.      Mental Status: He is alert and oriented to person, place, and time. Mental status is at baseline.      Motor: No weakness.      Coordination: Coordination normal.      Gait: Gait normal.   Psychiatric:         Mood and Affect: Mood normal.         Behavior: Behavior normal.         Thought Content: Thought content normal.         Judgment: Judgment normal.       Assessment:       1. Primary hypertension    2. Acquired hypothyroidism    3. Stage 3b chronic kidney disease    4. Mixed hyperlipidemia    5. Hyperglycemia        Plan:       Primary hypertension  -     CBC Auto Differential; Future; Expected date: 04/05/2023    Acquired hypothyroidism  -     TSH; Future; Expected date: 04/05/2023    Stage 3b chronic kidney disease  -     CBC Auto Differential;  Future; Expected date: 04/05/2023  -     Comprehensive Metabolic Panel; Future; Expected date: 04/05/2023  -     Magnesium; Future; Expected date: 04/05/2023  -     Phosphorus; Future; Expected date: 04/05/2023    Mixed hyperlipidemia  -     Lipid Panel; Future; Expected date: 04/05/2023    Hyperglycemia  -     Hemoglobin A1C; Future; Expected date: 04/05/2023            Previous records in Epic were reviewed, including the last 3 months of encounters, imaging, laboratory, and pathology reports.    Strict return precautions reviewed and patient verbalized understanding. Risks, benefits, and alternatives to the plan were reviewed in detail and all questions answered to the patient's satisfaction. Patient agrees to return to clinic or ER if symptoms worsen. 30 minutes total were spent on today's visit, not limited to but including time based on counseling and coordination of care.    Patient instructed that best way to communicate with my office staff is for patient to get on the [facility] patient portal to expedite communication and communication issues that may occur.  Patient was given instructions on how to get on the portal.  I encouraged patient to obtain portal access as well.  Ultimately it is up to the patient to obtain access.  Patient voiced understanding.    This note was created using [employer] voice recognition software that occasionally may misinterpret phrases or words.    Follow up for Wellness labs and follow up wellness visit in early July.

## 2023-04-16 PROBLEM — R73.9 HYPERGLYCEMIA: Status: ACTIVE | Noted: 2023-04-16

## 2023-05-09 ENCOUNTER — PATIENT MESSAGE (OUTPATIENT)
Dept: ADMINISTRATIVE | Facility: OTHER | Age: 83
End: 2023-05-09
Payer: MEDICARE

## 2023-05-24 RX ORDER — CARVEDILOL 3.12 MG/1
TABLET ORAL
Qty: 180 TABLET | Refills: 3 | Status: SHIPPED | OUTPATIENT
Start: 2023-05-24

## 2023-07-03 ENCOUNTER — TELEPHONE (OUTPATIENT)
Dept: FAMILY MEDICINE | Facility: CLINIC | Age: 83
End: 2023-07-03
Payer: MEDICARE

## 2023-07-03 NOTE — TELEPHONE ENCOUNTER
----- Message from Sana Peace sent at 7/3/2023  8:02 AM CDT -----  Regarding: Appointment clarification needed for 7/5  Contact: patient at [phone]  Type: Appointment clarification needed for 7/5  Who Called:  patient at [phone]    Additional Information: Patient needs clarification on appointment changes for 7/5. If he is going to have a fasting lab, he needs to come in at 8:30am. If not, he needs to know why he is coming in. Please call and advise. Thank you

## 2023-07-05 ENCOUNTER — TELEPHONE (OUTPATIENT)
Dept: FAMILY MEDICINE | Facility: CLINIC | Age: 83
End: 2023-07-05
Payer: MEDICARE

## 2023-07-05 NOTE — TELEPHONE ENCOUNTER
----- Message from Haylee Buckley sent at 7/3/2023 11:35 AM CDT -----  Contact: pt  Type: Needs Medical Advice         Who Called: pt  Best Call Back Number:[phone]  Additional Information: Requesting a call back regarding  pt lab was changed to a nurse vist and pt said it's to late in the day for fasting for pt, he needs sooner time in morning.  Please Advise- Thank you

## 2023-07-05 NOTE — TELEPHONE ENCOUNTER
Spoke with patient and informed  him that he can come in at 8 am. I also explained that our lab appointment is  now closed  so those appt had to be moved  from the lab schedule to the Nurse visit. He voiced understanding.

## 2023-07-06 ENCOUNTER — CLINICAL SUPPORT (OUTPATIENT)
Dept: FAMILY MEDICINE | Facility: CLINIC | Age: 83
End: 2023-07-06
Payer: MEDICARE

## 2023-07-06 DIAGNOSIS — E03.9 ACQUIRED HYPOTHYROIDISM: ICD-10-CM

## 2023-07-06 DIAGNOSIS — R73.9 HYPERGLYCEMIA: ICD-10-CM

## 2023-07-06 DIAGNOSIS — N18.32 STAGE 3B CHRONIC KIDNEY DISEASE: ICD-10-CM

## 2023-07-06 DIAGNOSIS — E78.2 MIXED HYPERLIPIDEMIA: ICD-10-CM

## 2023-07-06 DIAGNOSIS — I10 PRIMARY HYPERTENSION: ICD-10-CM

## 2023-07-06 LAB
ALBUMIN SERPL BCP-MCNC: 3.6 G/DL (ref 3.5–5.2)
ALP SERPL-CCNC: 62 U/L (ref 55–135)
ALT SERPL W/O P-5'-P-CCNC: 26 U/L (ref 10–44)
ANION GAP SERPL CALC-SCNC: 9 MMOL/L (ref 8–16)
AST SERPL-CCNC: 21 U/L (ref 10–40)
BASOPHILS # BLD AUTO: 0.05 K/UL (ref 0–0.2)
BASOPHILS NFR BLD: 0.7 % (ref 0–1.9)
BILIRUB SERPL-MCNC: 0.8 MG/DL (ref 0.1–1)
BUN SERPL-MCNC: 27 MG/DL (ref 8–23)
CALCIUM SERPL-MCNC: 9.2 MG/DL (ref 8.7–10.5)
CHLORIDE SERPL-SCNC: 108 MMOL/L (ref 95–110)
CHOLEST SERPL-MCNC: 176 MG/DL (ref 120–199)
CHOLEST/HDLC SERPL: 3.6 {RATIO} (ref 2–5)
CO2 SERPL-SCNC: 21 MMOL/L (ref 23–29)
CREAT SERPL-MCNC: 1.5 MG/DL (ref 0.5–1.4)
DIFFERENTIAL METHOD: ABNORMAL
EOSINOPHIL # BLD AUTO: 0.2 K/UL (ref 0–0.5)
EOSINOPHIL NFR BLD: 2.4 % (ref 0–8)
ERYTHROCYTE [DISTWIDTH] IN BLOOD BY AUTOMATED COUNT: 12.2 % (ref 11.5–14.5)
EST. GFR  (NO RACE VARIABLE): 46.2 ML/MIN/1.73 M^2
ESTIMATED AVG GLUCOSE: 105 MG/DL (ref 68–131)
GLUCOSE SERPL-MCNC: 100 MG/DL (ref 70–110)
HBA1C MFR BLD: 5.3 % (ref 4–5.6)
HCT VFR BLD AUTO: 39.1 % (ref 40–54)
HDLC SERPL-MCNC: 49 MG/DL (ref 40–75)
HDLC SERPL: 27.8 % (ref 20–50)
HGB BLD-MCNC: 13.4 G/DL (ref 14–18)
IMM GRANULOCYTES # BLD AUTO: 0.01 K/UL (ref 0–0.04)
IMM GRANULOCYTES NFR BLD AUTO: 0.1 % (ref 0–0.5)
LDLC SERPL CALC-MCNC: 104.6 MG/DL (ref 63–159)
LYMPHOCYTES # BLD AUTO: 2.5 K/UL (ref 1–4.8)
LYMPHOCYTES NFR BLD: 33.1 % (ref 18–48)
MAGNESIUM SERPL-MCNC: 2.2 MG/DL (ref 1.6–2.6)
MCH RBC QN AUTO: 31.7 PG (ref 27–31)
MCHC RBC AUTO-ENTMCNC: 34.3 G/DL (ref 32–36)
MCV RBC AUTO: 92 FL (ref 82–98)
MONOCYTES # BLD AUTO: 0.8 K/UL (ref 0.3–1)
MONOCYTES NFR BLD: 10.3 % (ref 4–15)
NEUTROPHILS # BLD AUTO: 4 K/UL (ref 1.8–7.7)
NEUTROPHILS NFR BLD: 53.4 % (ref 38–73)
NONHDLC SERPL-MCNC: 127 MG/DL
NRBC BLD-RTO: 0 /100 WBC
PHOSPHATE SERPL-MCNC: 2.5 MG/DL (ref 2.7–4.5)
PLATELET # BLD AUTO: 238 K/UL (ref 150–450)
PMV BLD AUTO: 9.9 FL (ref 9.2–12.9)
POTASSIUM SERPL-SCNC: 4.7 MMOL/L (ref 3.5–5.1)
PROT SERPL-MCNC: 7.1 G/DL (ref 6–8.4)
RBC # BLD AUTO: 4.23 M/UL (ref 4.6–6.2)
SODIUM SERPL-SCNC: 138 MMOL/L (ref 136–145)
TRIGL SERPL-MCNC: 112 MG/DL (ref 30–150)
TSH SERPL DL<=0.005 MIU/L-ACNC: 3.21 UIU/ML (ref 0.4–4)
WBC # BLD AUTO: 7.47 K/UL (ref 3.9–12.7)

## 2023-07-06 PROCEDURE — 83735 ASSAY OF MAGNESIUM: CPT | Performed by: FAMILY MEDICINE

## 2023-07-06 PROCEDURE — 84100 ASSAY OF PHOSPHORUS: CPT | Performed by: FAMILY MEDICINE

## 2023-07-06 PROCEDURE — 80061 LIPID PANEL: CPT | Performed by: FAMILY MEDICINE

## 2023-07-06 PROCEDURE — 85025 COMPLETE CBC W/AUTO DIFF WBC: CPT | Performed by: FAMILY MEDICINE

## 2023-07-06 PROCEDURE — 83036 HEMOGLOBIN GLYCOSYLATED A1C: CPT | Performed by: FAMILY MEDICINE

## 2023-07-06 PROCEDURE — 80053 COMPREHEN METABOLIC PANEL: CPT | Performed by: FAMILY MEDICINE

## 2023-07-06 PROCEDURE — 84443 ASSAY THYROID STIM HORMONE: CPT | Performed by: FAMILY MEDICINE

## 2023-07-07 ENCOUNTER — PATIENT MESSAGE (OUTPATIENT)
Dept: FAMILY MEDICINE | Facility: CLINIC | Age: 83
End: 2023-07-07
Payer: MEDICARE

## 2023-07-19 RX ORDER — PRAVASTATIN SODIUM 40 MG/1
TABLET ORAL
Qty: 90 TABLET | Refills: 3 | Status: SHIPPED | OUTPATIENT
Start: 2023-07-19

## 2023-07-26 ENCOUNTER — OFFICE VISIT (OUTPATIENT)
Dept: FAMILY MEDICINE | Facility: CLINIC | Age: 83
End: 2023-07-26
Payer: MEDICARE

## 2023-07-26 VITALS
BODY MASS INDEX: 25.46 KG/M2 | WEIGHT: 168 LBS | SYSTOLIC BLOOD PRESSURE: 138 MMHG | HEIGHT: 68 IN | HEART RATE: 65 BPM | DIASTOLIC BLOOD PRESSURE: 72 MMHG | OXYGEN SATURATION: 95 %

## 2023-07-26 DIAGNOSIS — N18.32 STAGE 3B CHRONIC KIDNEY DISEASE: ICD-10-CM

## 2023-07-26 DIAGNOSIS — I10 PRIMARY HYPERTENSION: ICD-10-CM

## 2023-07-26 DIAGNOSIS — E03.9 ACQUIRED HYPOTHYROIDISM: ICD-10-CM

## 2023-07-26 DIAGNOSIS — Z00.00 ANNUAL PHYSICAL EXAM: Primary | ICD-10-CM

## 2023-07-26 PROCEDURE — 1159F MED LIST DOCD IN RCRD: CPT | Mod: CPTII,S$GLB,, | Performed by: FAMILY MEDICINE

## 2023-07-26 PROCEDURE — 1157F PR ADVANCE CARE PLAN OR EQUIV PRESENT IN MEDICAL RECORD: ICD-10-PCS | Mod: CPTII,S$GLB,, | Performed by: FAMILY MEDICINE

## 2023-07-26 PROCEDURE — 1160F PR REVIEW ALL MEDS BY PRESCRIBER/CLIN PHARMACIST DOCUMENTED: ICD-10-PCS | Mod: CPTII,S$GLB,, | Performed by: FAMILY MEDICINE

## 2023-07-26 PROCEDURE — 3078F DIAST BP <80 MM HG: CPT | Mod: CPTII,S$GLB,, | Performed by: FAMILY MEDICINE

## 2023-07-26 PROCEDURE — 1160F RVW MEDS BY RX/DR IN RCRD: CPT | Mod: CPTII,S$GLB,, | Performed by: FAMILY MEDICINE

## 2023-07-26 PROCEDURE — 1101F PR PT FALLS ASSESS DOC 0-1 FALLS W/OUT INJ PAST YR: ICD-10-PCS | Mod: CPTII,S$GLB,, | Performed by: FAMILY MEDICINE

## 2023-07-26 PROCEDURE — 99999 PR PBB SHADOW E&M-EST. PATIENT-LVL III: ICD-10-PCS | Mod: PBBFAC,,, | Performed by: FAMILY MEDICINE

## 2023-07-26 PROCEDURE — 1159F PR MEDICATION LIST DOCUMENTED IN MEDICAL RECORD: ICD-10-PCS | Mod: CPTII,S$GLB,, | Performed by: FAMILY MEDICINE

## 2023-07-26 PROCEDURE — 3075F SYST BP GE 130 - 139MM HG: CPT | Mod: CPTII,S$GLB,, | Performed by: FAMILY MEDICINE

## 2023-07-26 PROCEDURE — 99215 OFFICE O/P EST HI 40 MIN: CPT | Mod: S$GLB,,, | Performed by: FAMILY MEDICINE

## 2023-07-26 PROCEDURE — 1101F PT FALLS ASSESS-DOCD LE1/YR: CPT | Mod: CPTII,S$GLB,, | Performed by: FAMILY MEDICINE

## 2023-07-26 PROCEDURE — 1157F ADVNC CARE PLAN IN RCRD: CPT | Mod: CPTII,S$GLB,, | Performed by: FAMILY MEDICINE

## 2023-07-26 PROCEDURE — 99999 PR PBB SHADOW E&M-EST. PATIENT-LVL III: CPT | Mod: PBBFAC,,, | Performed by: FAMILY MEDICINE

## 2023-07-26 PROCEDURE — 3078F PR MOST RECENT DIASTOLIC BLOOD PRESSURE < 80 MM HG: ICD-10-PCS | Mod: CPTII,S$GLB,, | Performed by: FAMILY MEDICINE

## 2023-07-26 PROCEDURE — 3288F PR FALLS RISK ASSESSMENT DOCUMENTED: ICD-10-PCS | Mod: CPTII,S$GLB,, | Performed by: FAMILY MEDICINE

## 2023-07-26 PROCEDURE — 99215 PR OFFICE/OUTPT VISIT, EST, LEVL V, 40-54 MIN: ICD-10-PCS | Mod: S$GLB,,, | Performed by: FAMILY MEDICINE

## 2023-07-26 PROCEDURE — 1126F PR PAIN SEVERITY QUANTIFIED, NO PAIN PRESENT: ICD-10-PCS | Mod: CPTII,S$GLB,, | Performed by: FAMILY MEDICINE

## 2023-07-26 PROCEDURE — 3288F FALL RISK ASSESSMENT DOCD: CPT | Mod: CPTII,S$GLB,, | Performed by: FAMILY MEDICINE

## 2023-07-26 PROCEDURE — 3075F PR MOST RECENT SYSTOLIC BLOOD PRESS GE 130-139MM HG: ICD-10-PCS | Mod: CPTII,S$GLB,, | Performed by: FAMILY MEDICINE

## 2023-07-26 PROCEDURE — 1126F AMNT PAIN NOTED NONE PRSNT: CPT | Mod: CPTII,S$GLB,, | Performed by: FAMILY MEDICINE

## 2023-07-26 RX ORDER — VALSARTAN 320 MG/1
320 TABLET ORAL EVERY MORNING
Qty: 90 TABLET | Refills: 3 | Status: SHIPPED | OUTPATIENT
Start: 2023-07-26 | End: 2024-03-01

## 2023-07-26 RX ORDER — CLONIDINE HYDROCHLORIDE 0.1 MG/1
0.1 TABLET ORAL EVERY 4 HOURS PRN
Qty: 90 TABLET | Refills: 1 | Status: SHIPPED | OUTPATIENT
Start: 2023-07-26 | End: 2023-11-27 | Stop reason: SDUPTHER

## 2023-07-26 RX ORDER — NIFEDIPINE 60 MG/1
60 TABLET, EXTENDED RELEASE ORAL 2 TIMES DAILY
Qty: 180 TABLET | Refills: 3 | Status: SHIPPED | OUTPATIENT
Start: 2023-07-26

## 2023-07-26 NOTE — PROGRESS NOTES
Labs/Tests:  CBC:  Lab Results   Component Value Date    WBC 7.47 07/06/2023    RBC 4.23 (L) 07/06/2023    HGB 13.4 (L) 07/06/2023    HCT 39.1 (L) 07/06/2023    MCV 92 07/06/2023    MCH 31.7 (H) 07/06/2023    MCHC 34.3 07/06/2023    RDW 12.2 07/06/2023     07/06/2023    MPV 9.9 07/06/2023    GRAN 4.0 07/06/2023    GRAN 53.4 07/06/2023    LYMPH 2.5 07/06/2023    LYMPH 33.1 07/06/2023    MONO 0.8 07/06/2023    MONO 10.3 07/06/2023    EOS 0.2 07/06/2023    BASO 0.05 07/06/2023    EOSINOPHIL 2.4 07/06/2023    BASOPHIL 0.7 07/06/2023    DIFFMETHOD Automated 07/06/2023     CMP:  Lab Results   Component Value Date     07/06/2023    K 4.7 07/06/2023     07/06/2023    CO2 21 (L) 07/06/2023    BUN 27 (H) 07/06/2023    CREATININE 1.5 (H) 07/06/2023     07/06/2023    CALCIUM 9.2 07/06/2023    MG 2.2 07/06/2023    PHOS 2.5 (L) 07/06/2023    ALKPHOS 62 07/06/2023    PROT 7.1 07/06/2023    ALBUMIN 3.6 07/06/2023    BILITOT 0.8 07/06/2023    AST 21 07/06/2023    ALT 26 07/06/2023    ANIONGAP 9 07/06/2023    EGFRNORACEVR 46.2 (A) 07/06/2023     HA1C:  Lab Results   Component Value Date    HGBA1C 5.3 07/06/2023    ESTIMATEDAVG 105 07/06/2023     LIPIDS:  Lab Results   Component Value Date    CHOL 176 07/06/2023    TRIG 112 07/06/2023    HDL 49 07/06/2023    LDLCALC 104.6 07/06/2023    CHOLHDL 27.8 07/06/2023    TOTALCHOLEST 3.6 07/06/2023    NONHDLCHOL 127 07/06/2023     Magnesium:  Lab Results   Component Value Date    MG 2.2 07/06/2023     MicroAlbumin/Creatinine Ratio, Urine:  Lab Results   Component Value Date    LABMICR 26.0 06/23/2021    CREATRANDUR 231.0 06/23/2021    MICALBCREAT 11.3 06/23/2021     Iron / TIBC:  Lab Results   Component Value Date    IRON 112 06/06/2022    TRANSFERRIN 276 06/06/2022    TIBC 408 06/06/2022    FESATURATED 27 06/06/2022    FERRITIN 67 06/06/2022     TSH / T3 / T4:  Lab Results   Component Value Date    TSH 3.205 07/06/2023    T3FREE 2.5 02/06/2018    FREET4 1.19  06/06/2022     Vit B / Vit D:  Lab Results   Component Value Date    XXVHCEQQ95 286 06/06/2022    CVMQWDBQ97ID 70 06/23/2021     UA:  Lab Results   Component Value Date    COLORU Yellow 03/29/2023    APPEARANCEUA Hazy (A) 03/29/2023    PHUR 6.0 03/29/2023    SPECGRAV >=1.030 (A) 03/29/2023    PROTEINUA Trace (A) 03/29/2023    GLUCUA Negative 03/29/2023    KETONESU Negative 03/29/2023    BILIRUBINUA Negative 03/29/2023    OCCULTUA Negative 03/29/2023    NITRITE Negative 03/29/2023    UROBILINOGEN Negative 03/29/2023    LEUKOCYTESUR Negative 03/29/2023     UA Reflex w/ Culture:  Lab Results   Component Value Date    LEUKOCYTESUR Negative 03/29/2023    UROBILINOGEN Negative 03/29/2023     Subjective:       Patient ID: Mina Valentine is a 83 y.o. male.    Chief Complaint: Annual Exam    84yo male with HTN, CKD3, and hypothyroidism here today for annual checkup/well visit.     No acute complaints.    UTD on screenings and vaccinations.    Doing well, BP running wnl at home.      Depression Patient Health Questionnaire 4/5/2023 2/24/2022 7/26/2021 5/18/2018 10/18/2016 3/31/2015   Over the last two weeks how often have you been bothered by little interest or pleasure in doing things Not at all Not at all Not at all Not at all Not at all Not at all   Over the last two weeks how often have you been bothered by feeling down, depressed or hopeless Not at all Not at all Not at all Not at all Not at all Not at all   PHQ-2 Total Score 0 0 0 0 0 0   Over the last two weeks how often have you been bothered by trouble falling or staying asleep, or sleeping too much - - - - Not at all Not at all   Over the last two weeks how often have you been bothered by feeling tired or having little energy - - - - Not at all Not at all   Over the last two weeks how often have you been bothered by a poor appetite or overeating - - - - Not at all Not at all   Over the last two weeks how often have you been bothered by feeling bad about yourself -  or that you are a failure or have let yourself or your family down - - - - Not at all Not at all   Over the last two weeks how often have you been bothered by trouble concentrating on things, such as reading the newspaper or watching television - - - - Not at all Not at all   Over the last two weeks how often have you been bothered by moving or speaking so slowly that other people could have noticed. Or the opposite - being so fidgety or restless that you have been moving around a lot more than usual. - - - - Not at all Not at all   Over the last two weeks how often have you been bothered by thoughts that you would be better off dead, or of hurting yourself - - - - Not at all Not at all   If you checked off any problems, how difficult have these problems made it for you to do your work, take care of things at home or get along with other people? - - - - Not difficult at all Not difficult at all   Total Score - - - - 0 0     No flowsheet data found.    Review of Systems   All other systems reviewed and are negative.        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 2009    EYE SURGERY Bilateral 2018    cataract surgery    hydrocele  1960    INGUINAL HERNIA REPAIR Right 2021    PROSTATE SURGERY  2007    seed implant    UMBILICAL HERNIA REPAIR  2004    belly button; had previous repair that failed, unsure how long ago     Family History   Problem Relation Age of  "Onset    Dementia Mother     Coronary artery disease Father 58         MI    Diabetes Father         type 1    Heart disease Father     Stroke Sister     Heart disease Sister         a fib    No Known Problems Sister     Cancer Brother         brain cancer    No Known Problems Daughter     No Known Problems Daughter     No Known Problems Son        Review of patient's allergies indicates:   Allergen Reactions    Penicillins Rash     Other reaction(s): Flushing (skin)       Current Outpatient Medications:     carvediloL (COREG) 3.125 MG tablet, TAKE 1 TABLET TWICE DAILY WITH MEALS, Disp: 180 tablet, Rfl: 3    latanoprost 0.005 % ophthalmic solution, , Disp: , Rfl:     levothyroxine (SYNTHROID) 75 MCG tablet, TAKE 1 TABLET EVERY DAY ( NEED  APPOINTMENT AND LAB ), Disp: 90 tablet, Rfl: 3    omega 3-dha-epa-fish oil 120-180-500 mg Cap, Take 1 capsule by mouth once daily at 6am., Disp: , Rfl:     pravastatin (PRAVACHOL) 40 MG tablet, TAKE 1 TABLET EVERY DAY, Disp: 90 tablet, Rfl: 3    cloNIDine (CATAPRES) 0.1 MG tablet, Take 1 tablet (0.1 mg total) by mouth every 4 (four) hours as needed (blood pressure >180 systolic of >100). Blood pressure over 180 on TOP (systolic)) or over 95 on BOTTOM (diastolic), Disp: 90 tablet, Rfl: 1    fexofenadine (ALLEGRA) 180 MG tablet, Take 1 tablet (180 mg total) by mouth once daily. For allergies, Disp: 90 tablet, Rfl: 3    NIFEdipine (PROCARDIA-XL) 60 MG (OSM) 24 hr tablet, Take 1 tablet (60 mg total) by mouth 2 (two) times daily., Disp: 180 tablet, Rfl: 3    valsartan (DIOVAN) 320 MG tablet, Take 1 tablet (320 mg total) by mouth every morning., Disp: 90 tablet, Rfl: 3      Objective:      /72 (BP Location: Left arm, Patient Position: Sitting, BP Method: Medium (Manual))   Pulse 65   Ht 5' 8" (1.727 m)   Wt 76.2 kg (167 lb 15.9 oz)   SpO2 95%   BMI 25.54 kg/m²   Physical Exam  Vitals and nursing note reviewed.   Constitutional:       General: He is not in acute distress.   "   Appearance: Normal appearance. He is well-developed and normal weight. He is not ill-appearing, toxic-appearing or diaphoretic.   HENT:      Head: Normocephalic and atraumatic.      Right Ear: External ear normal.      Left Ear: External ear normal.      Nose: Nose normal. No congestion or rhinorrhea.      Mouth/Throat:      Mouth: Mucous membranes are moist.      Pharynx: Oropharynx is clear. No oropharyngeal exudate or posterior oropharyngeal erythema.   Eyes:      General: No scleral icterus.        Right eye: No discharge.         Left eye: No discharge.      Extraocular Movements: Extraocular movements intact.      Conjunctiva/sclera: Conjunctivae normal.      Pupils: Pupils are equal, round, and reactive to light.   Neck:      Thyroid: No thyromegaly.      Vascular: No carotid bruit or JVD.      Trachea: No tracheal deviation.   Cardiovascular:      Rate and Rhythm: Normal rate and regular rhythm.      Pulses: Normal pulses.      Heart sounds: Normal heart sounds. No murmur heard.     No friction rub.   Pulmonary:      Effort: Pulmonary effort is normal. No respiratory distress.      Breath sounds: Normal breath sounds. No wheezing.   Abdominal:      General: Abdomen is flat. There is no distension.      Palpations: Abdomen is soft.      Tenderness: There is no right CVA tenderness or left CVA tenderness.   Musculoskeletal:         General: No deformity.      Cervical back: Neck supple. No rigidity or tenderness. No muscular tenderness.      Right lower leg: No edema.      Left lower leg: No edema.   Lymphadenopathy:      Cervical: No cervical adenopathy.   Skin:     General: Skin is warm and dry.      Capillary Refill: Capillary refill takes less than 2 seconds.      Coloration: Skin is not jaundiced or pale.   Neurological:      General: No focal deficit present.      Mental Status: He is alert and oriented to person, place, and time. Mental status is at baseline.      Motor: No weakness.       Coordination: Coordination normal.      Gait: Gait normal.   Psychiatric:         Mood and Affect: Mood normal.         Behavior: Behavior normal.         Thought Content: Thought content normal.         Judgment: Judgment normal.         Assessment:       1. Annual physical exam    2. Primary hypertension    3. Stage 3b chronic kidney disease    4. Acquired hypothyroidism        Plan:       Annual physical exam    Primary hypertension  -     valsartan (DIOVAN) 320 MG tablet; Take 1 tablet (320 mg total) by mouth every morning.  Dispense: 90 tablet; Refill: 3  -     NIFEdipine (PROCARDIA-XL) 60 MG (OSM) 24 hr tablet; Take 1 tablet (60 mg total) by mouth 2 (two) times daily.  Dispense: 180 tablet; Refill: 3  -     cloNIDine (CATAPRES) 0.1 MG tablet; Take 1 tablet (0.1 mg total) by mouth every 4 (four) hours as needed (blood pressure >180 systolic of >100). Blood pressure over 180 on TOP (systolic)) or over 95 on BOTTOM (diastolic)  Dispense: 90 tablet; Refill: 1  -     Magnesium; Future; Expected date: 01/15/2024  -     Phosphorus; Future; Expected date: 01/15/2024    Stage 3b chronic kidney disease  -     Comprehensive Metabolic Panel; Future; Expected date: 01/15/2024  -     CBC Auto Differential; Future; Expected date: 01/15/2024    Acquired hypothyroidism    [facility] pharmacy--shingles vaccine (recently completed)    Recent labs resulted in [facility] were reviewed in detail with patient and all questions answered to his/her satisfaction.    Repeat labs in Jan 2024  Refill meds as above  No changes to med routine at this time        Previous records in Epic were reviewed, including the last 3 months of encounters, imaging, laboratory, and pathology reports.    Strict return precautions reviewed and patient verbalized understanding. Risks, benefits, and alternatives to the plan were reviewed in detail and all questions answered to the patient's satisfaction. Patient agrees to return to clinic or ER if symptoms worsen. 40  minutes total were spent on today's visit, not limited to but including time based on counseling and coordination of care.    Patient instructed that best way to communicate with my office staff is for patient to get on the [facility] epic patient portal to expedite communication and communication issues that may occur.  Patient was given instructions on how to get on the portal.  I encouraged patient to obtain portal access as well.  Ultimately it is up to the patient to obtain access.  Patient voiced understanding.    This note was created using [employer] voice recognition software that occasionally may misinterpret phrases or words.    Follow up in about 6 months (around 1/26/2024) for follow up labs.

## 2023-09-28 DIAGNOSIS — I10 PRIMARY HYPERTENSION: ICD-10-CM

## 2023-11-24 ENCOUNTER — PATIENT MESSAGE (OUTPATIENT)
Dept: FAMILY MEDICINE | Facility: CLINIC | Age: 83
End: 2023-11-24
Payer: MEDICARE

## 2023-11-24 DIAGNOSIS — I10 PRIMARY HYPERTENSION: ICD-10-CM

## 2023-11-27 RX ORDER — CLONIDINE HYDROCHLORIDE 0.1 MG/1
0.1 TABLET ORAL EVERY 4 HOURS PRN
Qty: 90 TABLET | Refills: 1 | Status: SHIPPED | OUTPATIENT
Start: 2023-11-27

## 2023-11-27 NOTE — TELEPHONE ENCOUNTER
No care due was identified.  Health [facility] Embedded Care Due Messages. Reference number: [id number].   11/27/2023 7:18:10 AM CST

## 2023-12-13 ENCOUNTER — CLINICAL SUPPORT (OUTPATIENT)
Dept: FAMILY MEDICINE | Facility: CLINIC | Age: 83
End: 2023-12-13
Payer: MEDICARE

## 2023-12-13 DIAGNOSIS — N18.32 STAGE 3B CHRONIC KIDNEY DISEASE: ICD-10-CM

## 2023-12-13 DIAGNOSIS — I10 PRIMARY HYPERTENSION: ICD-10-CM

## 2023-12-13 LAB
ALBUMIN SERPL BCP-MCNC: 3.8 G/DL (ref 3.5–5.2)
ALP SERPL-CCNC: 67 U/L (ref 55–135)
ALT SERPL W/O P-5'-P-CCNC: 24 U/L (ref 10–44)
ANION GAP SERPL CALC-SCNC: 11 MMOL/L (ref 8–16)
AST SERPL-CCNC: 19 U/L (ref 10–40)
BASOPHILS # BLD AUTO: 0.06 K/UL (ref 0–0.2)
BASOPHILS NFR BLD: 0.7 % (ref 0–1.9)
BILIRUB SERPL-MCNC: 0.8 MG/DL (ref 0.1–1)
BUN SERPL-MCNC: 18 MG/DL (ref 8–23)
CALCIUM SERPL-MCNC: 9.6 MG/DL (ref 8.7–10.5)
CHLORIDE SERPL-SCNC: 104 MMOL/L (ref 95–110)
CO2 SERPL-SCNC: 22 MMOL/L (ref 23–29)
CREAT SERPL-MCNC: 1.6 MG/DL (ref 0.5–1.4)
DIFFERENTIAL METHOD: ABNORMAL
EOSINOPHIL # BLD AUTO: 0.2 K/UL (ref 0–0.5)
EOSINOPHIL NFR BLD: 2.1 % (ref 0–8)
ERYTHROCYTE [DISTWIDTH] IN BLOOD BY AUTOMATED COUNT: 12.3 % (ref 11.5–14.5)
EST. GFR  (NO RACE VARIABLE): 42.8 ML/MIN/1.73 M^2
GLUCOSE SERPL-MCNC: 111 MG/DL (ref 70–110)
HCT VFR BLD AUTO: 41.4 % (ref 40–54)
HGB BLD-MCNC: 13.9 G/DL (ref 14–18)
IMM GRANULOCYTES # BLD AUTO: 0.01 K/UL (ref 0–0.04)
IMM GRANULOCYTES NFR BLD AUTO: 0.1 % (ref 0–0.5)
LYMPHOCYTES # BLD AUTO: 2.6 K/UL (ref 1–4.8)
LYMPHOCYTES NFR BLD: 31.5 % (ref 18–48)
MAGNESIUM SERPL-MCNC: 2.1 MG/DL (ref 1.6–2.6)
MCH RBC QN AUTO: 31.6 PG (ref 27–31)
MCHC RBC AUTO-ENTMCNC: 33.6 G/DL (ref 32–36)
MCV RBC AUTO: 94 FL (ref 82–98)
MONOCYTES # BLD AUTO: 0.9 K/UL (ref 0.3–1)
MONOCYTES NFR BLD: 11.4 % (ref 4–15)
NEUTROPHILS # BLD AUTO: 4.4 K/UL (ref 1.8–7.7)
NEUTROPHILS NFR BLD: 54.2 % (ref 38–73)
NRBC BLD-RTO: 0 /100 WBC
PHOSPHATE SERPL-MCNC: 2.5 MG/DL (ref 2.7–4.5)
PLATELET # BLD AUTO: 272 K/UL (ref 150–450)
PMV BLD AUTO: 9.6 FL (ref 9.2–12.9)
POTASSIUM SERPL-SCNC: 4.8 MMOL/L (ref 3.5–5.1)
PROT SERPL-MCNC: 7.6 G/DL (ref 6–8.4)
RBC # BLD AUTO: 4.4 M/UL (ref 4.6–6.2)
SODIUM SERPL-SCNC: 137 MMOL/L (ref 136–145)
WBC # BLD AUTO: 8.16 K/UL (ref 3.9–12.7)

## 2023-12-13 PROCEDURE — 84100 ASSAY OF PHOSPHORUS: CPT | Performed by: FAMILY MEDICINE

## 2023-12-13 PROCEDURE — 83735 ASSAY OF MAGNESIUM: CPT | Performed by: FAMILY MEDICINE

## 2023-12-13 PROCEDURE — 80053 COMPREHEN METABOLIC PANEL: CPT | Performed by: FAMILY MEDICINE

## 2023-12-13 PROCEDURE — 85025 COMPLETE CBC W/AUTO DIFF WBC: CPT | Performed by: FAMILY MEDICINE

## 2023-12-20 ENCOUNTER — OFFICE VISIT (OUTPATIENT)
Dept: FAMILY MEDICINE | Facility: CLINIC | Age: 83
End: 2023-12-20
Payer: MEDICARE

## 2023-12-20 ENCOUNTER — LAB VISIT (OUTPATIENT)
Dept: FAMILY MEDICINE | Facility: CLINIC | Age: 83
End: 2023-12-20
Payer: MEDICARE

## 2023-12-20 ENCOUNTER — TELEPHONE (OUTPATIENT)
Dept: FAMILY MEDICINE | Facility: CLINIC | Age: 83
End: 2023-12-20

## 2023-12-20 VITALS
DIASTOLIC BLOOD PRESSURE: 66 MMHG | SYSTOLIC BLOOD PRESSURE: 130 MMHG | HEART RATE: 67 BPM | OXYGEN SATURATION: 98 % | BODY MASS INDEX: 25.49 KG/M2 | WEIGHT: 168.19 LBS | HEIGHT: 68 IN

## 2023-12-20 DIAGNOSIS — E03.9 ACQUIRED HYPOTHYROIDISM: ICD-10-CM

## 2023-12-20 DIAGNOSIS — E83.39 LOW PHOSPHATE LEVELS: ICD-10-CM

## 2023-12-20 DIAGNOSIS — N18.32 STAGE 3B CHRONIC KIDNEY DISEASE: ICD-10-CM

## 2023-12-20 DIAGNOSIS — I10 PRIMARY HYPERTENSION: Primary | ICD-10-CM

## 2023-12-20 DIAGNOSIS — E78.2 MIXED HYPERLIPIDEMIA: ICD-10-CM

## 2023-12-20 LAB
25(OH)D3+25(OH)D2 SERPL-MCNC: 38 NG/ML (ref 30–96)
PTH-INTACT SERPL-MCNC: 66.1 PG/ML (ref 9–77)

## 2023-12-20 PROCEDURE — 99214 OFFICE O/P EST MOD 30 MIN: CPT | Mod: S$GLB,,, | Performed by: FAMILY MEDICINE

## 2023-12-20 PROCEDURE — 1126F PR PAIN SEVERITY QUANTIFIED, NO PAIN PRESENT: ICD-10-PCS | Mod: CPTII,S$GLB,, | Performed by: FAMILY MEDICINE

## 2023-12-20 PROCEDURE — 1159F MED LIST DOCD IN RCRD: CPT | Mod: CPTII,S$GLB,, | Performed by: FAMILY MEDICINE

## 2023-12-20 PROCEDURE — 99999 PR PBB SHADOW E&M-EST. PATIENT-LVL III: CPT | Mod: PBBFAC,,, | Performed by: FAMILY MEDICINE

## 2023-12-20 PROCEDURE — 99999 PR PBB SHADOW E&M-EST. PATIENT-LVL III: ICD-10-PCS | Mod: PBBFAC,,, | Performed by: FAMILY MEDICINE

## 2023-12-20 PROCEDURE — 1160F RVW MEDS BY RX/DR IN RCRD: CPT | Mod: CPTII,S$GLB,, | Performed by: FAMILY MEDICINE

## 2023-12-20 PROCEDURE — 3288F FALL RISK ASSESSMENT DOCD: CPT | Mod: CPTII,S$GLB,, | Performed by: FAMILY MEDICINE

## 2023-12-20 PROCEDURE — 1126F AMNT PAIN NOTED NONE PRSNT: CPT | Mod: CPTII,S$GLB,, | Performed by: FAMILY MEDICINE

## 2023-12-20 PROCEDURE — 99214 PR OFFICE/OUTPT VISIT, EST, LEVL IV, 30-39 MIN: ICD-10-PCS | Mod: S$GLB,,, | Performed by: FAMILY MEDICINE

## 2023-12-20 PROCEDURE — 1101F PR PT FALLS ASSESS DOC 0-1 FALLS W/OUT INJ PAST YR: ICD-10-PCS | Mod: CPTII,S$GLB,, | Performed by: FAMILY MEDICINE

## 2023-12-20 PROCEDURE — 1159F PR MEDICATION LIST DOCUMENTED IN MEDICAL RECORD: ICD-10-PCS | Mod: CPTII,S$GLB,, | Performed by: FAMILY MEDICINE

## 2023-12-20 PROCEDURE — 1157F PR ADVANCE CARE PLAN OR EQUIV PRESENT IN MEDICAL RECORD: ICD-10-PCS | Mod: CPTII,S$GLB,, | Performed by: FAMILY MEDICINE

## 2023-12-20 PROCEDURE — 3075F SYST BP GE 130 - 139MM HG: CPT | Mod: CPTII,S$GLB,, | Performed by: FAMILY MEDICINE

## 2023-12-20 PROCEDURE — 3075F PR MOST RECENT SYSTOLIC BLOOD PRESS GE 130-139MM HG: ICD-10-PCS | Mod: CPTII,S$GLB,, | Performed by: FAMILY MEDICINE

## 2023-12-20 PROCEDURE — 83970 ASSAY OF PARATHORMONE: CPT | Performed by: FAMILY MEDICINE

## 2023-12-20 PROCEDURE — 1160F PR REVIEW ALL MEDS BY PRESCRIBER/CLIN PHARMACIST DOCUMENTED: ICD-10-PCS | Mod: CPTII,S$GLB,, | Performed by: FAMILY MEDICINE

## 2023-12-20 PROCEDURE — 1157F ADVNC CARE PLAN IN RCRD: CPT | Mod: CPTII,S$GLB,, | Performed by: FAMILY MEDICINE

## 2023-12-20 PROCEDURE — 3078F PR MOST RECENT DIASTOLIC BLOOD PRESSURE < 80 MM HG: ICD-10-PCS | Mod: CPTII,S$GLB,, | Performed by: FAMILY MEDICINE

## 2023-12-20 PROCEDURE — 3078F DIAST BP <80 MM HG: CPT | Mod: CPTII,S$GLB,, | Performed by: FAMILY MEDICINE

## 2023-12-20 PROCEDURE — 82306 VITAMIN D 25 HYDROXY: CPT | Performed by: FAMILY MEDICINE

## 2023-12-20 PROCEDURE — 1101F PT FALLS ASSESS-DOCD LE1/YR: CPT | Mod: CPTII,S$GLB,, | Performed by: FAMILY MEDICINE

## 2023-12-20 PROCEDURE — 3288F PR FALLS RISK ASSESSMENT DOCUMENTED: ICD-10-PCS | Mod: CPTII,S$GLB,, | Performed by: FAMILY MEDICINE

## 2023-12-20 NOTE — PROGRESS NOTES
Labs/Tests:  CBC:  Lab Results   Component Value Date    WBC 8.16 12/13/2023    RBC 4.40 (L) 12/13/2023    HGB 13.9 (L) 12/13/2023    HCT 41.4 12/13/2023    MCV 94 12/13/2023    MCH 31.6 (H) 12/13/2023    MCHC 33.6 12/13/2023    RDW 12.3 12/13/2023     12/13/2023    MPV 9.6 12/13/2023    GRAN 4.4 12/13/2023    GRAN 54.2 12/13/2023    LYMPH 2.6 12/13/2023    LYMPH 31.5 12/13/2023    MONO 0.9 12/13/2023    MONO 11.4 12/13/2023    EOS 0.2 12/13/2023    BASO 0.06 12/13/2023    EOSINOPHIL 2.1 12/13/2023    BASOPHIL 0.7 12/13/2023    DIFFMETHOD Automated 12/13/2023     CMP:  Lab Results   Component Value Date     12/13/2023    K 4.8 12/13/2023     12/13/2023    CO2 22 (L) 12/13/2023    BUN 18 12/13/2023    CREATININE 1.6 (H) 12/13/2023     (H) 12/13/2023    CALCIUM 9.6 12/13/2023    MG 2.1 12/13/2023    PHOS 2.5 (L) 12/13/2023    ALKPHOS 67 12/13/2023    PROT 7.6 12/13/2023    ALBUMIN 3.8 12/13/2023    BILITOT 0.8 12/13/2023    AST 19 12/13/2023    ALT 24 12/13/2023    ANIONGAP 11 12/13/2023    EGFRNORACEVR 42.8 (A) 12/13/2023     HA1C:  Lab Results   Component Value Date    HGBA1C 5.3 07/06/2023    ESTIMATEDAVG 105 07/06/2023     LIPIDS:  Lab Results   Component Value Date    CHOL 176 07/06/2023    TRIG 112 07/06/2023    HDL 49 07/06/2023    LDLCALC 104.6 07/06/2023    CHOLHDL 27.8 07/06/2023    TOTALCHOLEST 3.6 07/06/2023    NONHDLCHOL 127 07/06/2023     Magnesium:  Lab Results   Component Value Date    MG 2.1 12/13/2023     MicroAlbumin/Creatinine Ratio, Urine:  Lab Results   Component Value Date    LABMICR 26.0 06/23/2021    CREATRANDUR 231.0 06/23/2021    MICALBCREAT 11.3 06/23/2021     Iron / TIBC:  Lab Results   Component Value Date    IRON 112 06/06/2022    TRANSFERRIN 276 06/06/2022    TIBC 408 06/06/2022    FESATURATED 27 06/06/2022    FERRITIN 67 06/06/2022     TSH / T3 / T4:  Lab Results   Component Value Date    TSH 3.205 07/06/2023    T3FREE 2.5 02/06/2018    FREET4 1.19 06/06/2022      Vit B / Vit D:  Lab Results   Component Value Date    ZGQTUFJD95 286 06/06/2022    QONNLKQH04VJ 70 06/23/2021     UA:  Lab Results   Component Value Date    COLORU Yellow 03/29/2023    APPEARANCEUA Hazy (A) 03/29/2023    PHUR 6.0 03/29/2023    SPECGRAV >=1.030 (A) 03/29/2023    PROTEINUA Trace (A) 03/29/2023    GLUCUA Negative 03/29/2023    KETONESU Negative 03/29/2023    BILIRUBINUA Negative 03/29/2023    OCCULTUA Negative 03/29/2023    NITRITE Negative 03/29/2023    UROBILINOGEN Negative 03/29/2023    LEUKOCYTESUR Negative 03/29/2023     UA Reflex w/ Culture:  Lab Results   Component Value Date    LEUKOCYTESUR Negative 03/29/2023    UROBILINOGEN Negative 03/29/2023     Subjective:       Patient ID: Mina Valentine is a 83 y.o. male.    Chief Complaint: Follow-up (Pt is here for a follow up on labs)    82yo WM here today for follow up HTN, hypothyroidism, HLD, and CKD3. Recent labs in Epic were reviewed in detail with patient and all questions answered to his satisfaction.     Denies acute complaints.    Creatinine increased to 1.6 (up from 1.5), phos remains low at 2.5.    With Digital HTN program, reports home BP higher when he watches the news regularly. Has relatives living in [place] [facility], worries for their wellbeing and safety.    Denies dry skin, constipation, depressed mood.          4/5/2023     9:01 AM 2/24/2022    10:36 AM 7/26/2021    10:56 AM 5/18/2018    10:00 AM 10/18/2016     9:45 AM 3/31/2015     9:14 AM   Depression Patient Health Questionnaire   Over the last two weeks how often have you been bothered by little interest or pleasure in doing things Not at all Not at all Not at all Not at all Not at all Not at all   Over the last two weeks how often have you been bothered by feeling down, depressed or hopeless Not at all Not at all Not at all Not at all Not at all Not at all   PHQ-2 Total Score 0 0 0 0 0 0   Over the last two weeks how often have you been bothered by trouble falling or  staying asleep, or sleeping too much     Not at all Not at all   Over the last two weeks how often have you been bothered by feeling tired or having little energy     Not at all Not at all   Over the last two weeks how often have you been bothered by a poor appetite or overeating     Not at all Not at all   Over the last two weeks how often have you been bothered by feeling bad about yourself - or that you are a failure or have let yourself or your family down     Not at all Not at all   Over the last two weeks how often have you been bothered by trouble concentrating on things, such as reading the newspaper or watching television     Not at all Not at all   Over the last two weeks how often have you been bothered by moving or speaking so slowly that other people could have noticed. Or the opposite - being so fidgety or restless that you have been moving around a lot more than usual.     Not at all Not at all   Over the last two weeks how often have you been bothered by thoughts that you would be better off dead, or of hurting yourself     Not at all Not at all   If you checked off any problems, how difficult have these problems made it for you to do your work, take care of things at home or get along with other people?     Not difficult at all Not difficult at all   PHQ-9 Score     0 0          No data to display                Review of Systems   Gastrointestinal:  Negative for constipation.   Skin:  Negative for rash.   Psychiatric/Behavioral:  Negative for dysphoric mood.    All other systems reviewed and are negative.        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 2009    EYE SURGERY Bilateral 2018    cataract surgery    hydrocele  1960    INGUINAL HERNIA REPAIR Right     PROSTATE SURGERY  2007    seed implant    UMBILICAL HERNIA REPAIR  2004    belly button; had previous repair that failed, unsure how long ago     Family History   Problem Relation Age of Onset    Dementia Mother     Coronary artery disease Father 58         MI    Diabetes Father         type 1    Heart disease Father     Stroke Sister     Heart disease Sister         a fib    No Known Problems Sister     Cancer Brother 89        brain cancer    No Known Problems Daughter     No Known Problems Daughter     No Known Problems Son        Review of patient's allergies indicates:   Allergen Reactions    Penicillins Rash     Other reaction(s): Flushing (skin)       Current Outpatient Medications:     carvediloL (COREG) 3.125 MG tablet, TAKE 1 TABLET TWICE DAILY WITH MEALS, Disp: 180 tablet, Rfl: 3    cloNIDine (CATAPRES) 0.1 MG tablet, Take 1 tablet (0.1 mg total) by mouth every 4 (four) hours as needed (blood pressure >180 systolic of >100). Blood pressure over 180 on TOP (systolic)) or over 95 on BOTTOM (diastolic), Disp: 90 tablet, Rfl: 1    latanoprost 0.005 % ophthalmic solution, , Disp: , Rfl:     levothyroxine (SYNTHROID) 75 MCG tablet, TAKE 1 TABLET EVERY DAY ( NEED  APPOINTMENT AND LAB ), Disp: 90 tablet, Rfl: 3    NIFEdipine (PROCARDIA-XL) 60 MG (OSM) 24 hr tablet, Take 1 tablet (60 mg total) by mouth 2 (two) times daily., Disp: 180 tablet, Rfl: 3    omega 3-dha-epa-fish oil 120-180-500 mg Cap, Take 1 capsule by mouth once daily at 6am., Disp: , Rfl:     pravastatin (PRAVACHOL) 40 MG tablet, TAKE 1 TABLET EVERY DAY, Disp: 90 tablet, Rfl: 3    valsartan (DIOVAN) 320 MG tablet, Take  "1 tablet (320 mg total) by mouth every morning., Disp: 90 tablet, Rfl: 3    fexofenadine (ALLEGRA) 180 MG tablet, Take 1 tablet (180 mg total) by mouth once daily. For allergies, Disp: 90 tablet, Rfl: 3      Objective:      /66 (BP Location: Left arm, Patient Position: Sitting, BP Method: Medium (Manual))   Pulse 67   Ht 5' 8" (1.727 m)   Wt 76.3 kg (168 lb 3.4 oz)   SpO2 98%   BMI 25.58 kg/m²   Physical Exam  Vitals and nursing note reviewed.   Constitutional:       General: He is not in acute distress.     Appearance: Normal appearance. He is well-developed and normal weight. He is not ill-appearing, toxic-appearing or diaphoretic.   HENT:      Nose: Nose normal. No rhinorrhea.   Eyes:      General: No scleral icterus.        Right eye: No discharge.         Left eye: No discharge.   Neck:      Thyroid: No thyromegaly.      Vascular: No JVD.      Trachea: No tracheal deviation.   Cardiovascular:      Rate and Rhythm: Normal rate and regular rhythm.      Pulses: Normal pulses.      Heart sounds: Normal heart sounds.   Pulmonary:      Effort: Pulmonary effort is normal. No respiratory distress.      Breath sounds: Normal breath sounds. No wheezing.   Abdominal:      General: There is no distension.   Musculoskeletal:      Cervical back: Neck supple. No muscular tenderness.   Lymphadenopathy:      Cervical: No cervical adenopathy.   Skin:     General: Skin is warm and dry.      Capillary Refill: Capillary refill takes less than 2 seconds.      Coloration: Skin is not jaundiced or pale.   Neurological:      General: No focal deficit present.      Mental Status: He is alert and oriented to person, place, and time. Mental status is at baseline.      Gait: Gait normal.   Psychiatric:         Mood and Affect: Mood normal.         Behavior: Behavior normal.         Thought Content: Thought content normal.         Judgment: Judgment normal.         Assessment:       1. Primary hypertension    2. Stage 3b chronic " kidney disease    3. Low phosphate levels    4. Acquired hypothyroidism    5. Mixed hyperlipidemia        Plan:       Primary hypertension  -     Renal Function Panel; Future; Expected date: 03/18/2024    Stage 3b chronic kidney disease  -     Renal Function Panel; Future; Expected date: 03/18/2024    Low phosphate levels  -     Vitamin D; Future; Expected date: 12/20/2023  -     PTH, Intact; Future; Expected date: 12/20/2023    Acquired hypothyroidism    Mixed hyperlipidemia    Renal panel in 3 months.  Check vit D and PTH today.  Continue current medications, will refill prn.        Previous records in Epic were reviewed, including the last 3 months of encounters, imaging, laboratory, and pathology reports.    Strict return precautions reviewed and patient verbalized understanding. Risks, benefits, and alternatives to the plan were reviewed in detail and all questions answered to the patient's satisfaction. Patient agrees to return to clinic or ER if symptoms worsen. 30 minutes total were spent on today's visit, not limited to but including time based on counseling and coordination of care.    Patient instructed that best way to communicate with my office staff is for patient to get on the [facility] patient portal to expedite communication and communication issues that may occur.  Patient was given instructions on how to get on the portal.  I encouraged patient to obtain portal access as well.  Ultimately it is up to the patient to obtain access.  Patient voiced understanding.    This note was created using [employer] voice recognition software that occasionally may misinterpret phrases or words.    Follow up in about 3 months (around 3/20/2024) for routine checkup HTN and CKD.

## 2023-12-20 NOTE — TELEPHONE ENCOUNTER
----- Message from Blanca Hatch MD sent at 12/20/2023 12:07 PM CST -----  Making sure you booked OV for a few days afte rlabs, 3 months from now.

## 2024-02-03 ENCOUNTER — PATIENT MESSAGE (OUTPATIENT)
Dept: ADMINISTRATIVE | Facility: OTHER | Age: 84
End: 2024-02-03
Payer: MEDICARE

## 2024-02-20 ENCOUNTER — TELEPHONE (OUTPATIENT)
Dept: FAMILY MEDICINE | Facility: CLINIC | Age: 84
End: 2024-02-20

## 2024-02-20 ENCOUNTER — E-VISIT (OUTPATIENT)
Dept: FAMILY MEDICINE | Facility: CLINIC | Age: 84
End: 2024-02-20
Payer: MEDICARE

## 2024-02-20 DIAGNOSIS — Z12.5 ENCOUNTER FOR SCREENING FOR MALIGNANT NEOPLASM OF PROSTATE: ICD-10-CM

## 2024-02-20 DIAGNOSIS — N18.32 STAGE 3B CHRONIC KIDNEY DISEASE: ICD-10-CM

## 2024-02-20 DIAGNOSIS — Z85.46 HISTORY OF PROSTATE CANCER: ICD-10-CM

## 2024-02-20 DIAGNOSIS — R31.0 GROSS HEMATURIA: Primary | ICD-10-CM

## 2024-02-20 PROCEDURE — 99422 OL DIG E/M SVC 11-20 MIN: CPT | Mod: ,,, | Performed by: FAMILY MEDICINE

## 2024-02-20 NOTE — TELEPHONE ENCOUNTER
Spoke with patient. Appointment scheduled for lab and ultrasound. Patient will wait for results and decide about the urologist.

## 2024-02-20 NOTE — TELEPHONE ENCOUNTER
----- Message from Leatha Tyson sent at 2/20/2024 11:47 AM CST -----  Contact: PT  Type:  Needs Medical Advice    Who Called: PT   Symptoms (please be specific): GOOD AMOUNT OF BLOOD IN URINE  - PT ALSO HAS CHRONIC KIDNEY DIEASE   How long has patient had these symptoms:  ONE MONTH - STARTED OFF WITH A TINY AMOUNT OF BLOOD  BUT HAS INCREASED   Pharmacy name and phone #:  [phone] (home)   Would the patient rather a call back or a response via [facility]? CALL   Best Call Back Number: [phone] (home)   Additional Information: THANK YOU

## 2024-02-20 NOTE — TELEPHONE ENCOUNTER
Gross hematuria:    Labs ordered. Renal US ordered. Urology referral ordered.    Please schedule for labs asap--pt might want to have this done at [facility] (lives in [place]); for referral to urology--forward to provider of choice. There is a group in [place] but likely one in [place] also. We have two urologists with [facility] that come to [place], happy to send him there but I am aware of the drive.

## 2024-02-21 ENCOUNTER — TELEPHONE (OUTPATIENT)
Dept: FAMILY MEDICINE | Facility: CLINIC | Age: 84
End: 2024-02-21
Payer: MEDICARE

## 2024-02-21 ENCOUNTER — HOSPITAL ENCOUNTER (OUTPATIENT)
Dept: RADIOLOGY | Facility: HOSPITAL | Age: 84
Discharge: HOME | End: 2024-02-21
Attending: FAMILY MEDICINE
Payer: MEDICARE

## 2024-02-21 DIAGNOSIS — N18.32 STAGE 3B CHRONIC KIDNEY DISEASE: Primary | ICD-10-CM

## 2024-02-21 DIAGNOSIS — N18.32 STAGE 3B CHRONIC KIDNEY DISEASE: ICD-10-CM

## 2024-02-21 DIAGNOSIS — R31.0 GROSS HEMATURIA: ICD-10-CM

## 2024-02-21 PROCEDURE — 76770 US EXAM ABDO BACK WALL COMP: CPT | Mod: 26,,, | Performed by: RADIOLOGY

## 2024-02-21 PROCEDURE — 76770 US EXAM ABDO BACK WALL COMP: CPT | Mod: TC

## 2024-02-21 RX ORDER — CIPROFLOXACIN 250 MG/1
250 TABLET, FILM COATED ORAL 2 TIMES DAILY
Qty: 14 TABLET | Refills: 0 | Status: SHIPPED | OUTPATIENT
Start: 2024-02-21 | End: 2024-02-28

## 2024-02-21 NOTE — PROGRESS NOTES
Patient ID: Mina Valentine is a 84 y.o. male.    Chief Complaint: Urinary Tract Infection (Entered automatically based on patient selection in Patient Portal.)    The patient initiated a request through [facility] on 2/20/2024 for evaluation and management with a chief complaint of Urinary Tract Infection (Entered automatically based on patient selection in Patient Portal.)     I evaluated the questionnaire submission on 2/20/24-2/21/24.    [facility] Peq Evisit Uti Questionnaire    2/20/2024  3:59 PM CST - Filed by Patient   Do you agree to participate in an E-Visit? Yes   If you have any of the following problems, please present to your local ER or call 911:  I acknowledge   What is the main issue that you would like for your doctor to address today? bloody urine   Are you able to take your vital signs? No   What symptoms do you currently have? Change in urine appearance or smell   When did your symptoms first appear? 2/1/2024   List what you have done or taken to help your symptoms. i just noticed a small amount of blood but today it was a lot more   Please indicate whether you have had the following symptoms during the past 24 hours     Urgent urination (a sudden and uncontrollable urge to urinate) Moderate   Frequent urination of small amounts of urine (going to the toilet very often) Moderate   Burning pain when urinating Mild   Incomplete bladder emptying (still feel like you need to urinate again after urination) Mild   Pain not associated with urination in the lower abdomen below the belly button) None   What does your urine look like? Clear   Blood seen in the urine Mild   Flank pain (pain in one or both sides of the lower back) None   Discharge from the urethra (urinary opening) without urination None   High body temperature/fever? None-<99.5   Please rate how much discomfort you have experience because of the symptoms in the past 24 hours: None   Please indicate how the symptoms have interfered with your every  day activities/work in the past 24 hours: None   Please indicate how these symptoms have interfered with your social activities (visiting people, meeting with friends, etc.) in the past 24 hours? None   Are you a diabetic? No   Provide any information you feel is important to your history not asked above I have chronic kidney diesease   Please attach any relevant images or files (if you have performed a home test for UTI, please submit a photo of results)          Encounter Diagnoses   Name Primary?    Gross hematuria Yes    History of prostate cancer     Stage 3b chronic kidney disease     Encounter for screening for malignant neoplasm of prostate       Staff to call and schedule orders below for asap:  Urinalysis  Urine culture  Screening PSA  PT/INR  CBC  Renal panel  US retroperitoneal  Urology referral          Follow up if symptoms worsen or fail to improve, for keep scheduled follow up.      E-Visit Time Trackin minutes

## 2024-02-21 NOTE — TELEPHONE ENCOUNTER
Called to discuss results (labs, imaging.)    See E-visit.    Pt reports gross hematuria off and on over the last month. States he had a drop or two of blood with ?tissue passage.    Patient denies any urinary hesitancy, but has had slight burning with urination a few times.    UA with 1+ blood, urine cx pending.  Renal US stable from 6/2021.  Patient with hx of prostate cancer, PSA <0.1.    Recommend coverage with renally-dosed Cipro at 250mg BID with food.  Urology referral placed and patient already has visit scheduled 3/4/24.    Pt verb understanding.  Keep f/u with me 3/25/24.

## 2024-03-01 DIAGNOSIS — I10 PRIMARY HYPERTENSION: ICD-10-CM

## 2024-03-01 DIAGNOSIS — E03.9 HYPOTHYROIDISM, UNSPECIFIED TYPE: ICD-10-CM

## 2024-03-01 RX ORDER — LEVOTHYROXINE SODIUM 75 UG/1
TABLET ORAL
Qty: 90 TABLET | Refills: 1 | Status: SHIPPED | OUTPATIENT
Start: 2024-03-01

## 2024-03-01 RX ORDER — VALSARTAN 320 MG/1
320 TABLET ORAL EVERY MORNING
Qty: 90 TABLET | Refills: 3 | Status: SHIPPED | OUTPATIENT
Start: 2024-03-01

## 2024-03-01 NOTE — TELEPHONE ENCOUNTER
Refill Routing Note   Medication(s) are not appropriate for processing by [facility] for the following reason(s):        Required labs abnormal    [facility] action(s):  Defer  Approve             Pharmacist review requested: Yes     Appointments  past 12m or future 3m with PCP    Date Provider   Last Visit   2/20/2024 Blanca Hatch MD   Next Visit   3/25/2024 Blanca Hatch MD   ED visits in past 90 days: 0        Note composed:1:46 PM 03/01/2024

## 2024-03-01 NOTE — TELEPHONE ENCOUNTER
No care due was identified.  [facility] Embedded Care Due Messages. Reference number: [id number].   3/01/2024 1:29:58 PM CST

## 2024-03-02 NOTE — TELEPHONE ENCOUNTER
Refill Decision Note   Mina Jyotihu  is requesting a refill authorization.  Brief Assessment and Rationale for Refill:  Approve     Medication Therapy Plan:         Pharmacist review requested: Yes   Extended chart review required: Yes   Comments:     Note composed:6:20 PM 03/01/2024

## 2024-03-03 NOTE — PROGRESS NOTES
[facility] Note    PCP: Blanca Hatch MD    Chief Complaint: gross hematuria     SUBJECTIVE:       History of Present Illness:  Mina Valentine is a 84 y.o. male who presents to clinic for hematuria. He is New  to our clinic.     2 weeks ago had an episode of hematuria and passage of some tissue.     No bothersome urinary issues.     Renal US 2/20/24: no abnormal findings     Hx of prostate cancer s/p brachytherapy in 2006. No other therapy needed.     Former smoker, quit 40 years ago, 2-3 packs per day.   Former  at [employer].     UA today: trace blood, otherwise negative   PVR today: 22 cc    Last urine culture: no growth (2/21/24)    Lab Results   Component Value Date    CREATININE 1.9 (H) 02/21/2024     PSA, Screen (ng/mL)   Date Value   02/21/2024 <0.01     PSA Total (ng/mL)   Date Value   02/20/2017 0.02     PSA, Free (ng/mL)   Date Value   02/20/2017 <0.01     PSA, Free % (%)   Date Value   02/20/2017 Unable to calculate     Family  hx: no malignancy   Hx of HTN, HLD, CKD    Past medical, family, and social history reviewed as documented in chart with pertinent positive medical, family, and social history detailed in HPI.    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 2009    EYE SURGERY  "Bilateral 2018    cataract surgery    hydrocele  1960    INGUINAL HERNIA REPAIR Right     PROSTATE SURGERY  2007    seed implant    UMBILICAL HERNIA REPAIR  2004    belly button; had previous repair that failed, unsure how long ago     Family History   Problem Relation Age of Onset    Dementia Mother     Coronary artery disease Father 58         MI    Diabetes Father         type 1    Heart disease Father     Stroke Sister     Heart disease Sister         a fib    No Known Problems Sister     Cancer Brother 89        brain cancer    No Known Problems Daughter     No Known Problems Daughter     No Known Problems Son      Social History     Tobacco Use    Smoking status: Former     Current packs/day: 0.00     Average packs/day: 1 pack/day for 30.0 years (30.0 ttl pk-yrs)     Types: Cigarettes, Cigars     Start date: 1955     Quit date: 1985     Years since quittin.1    Smokeless tobacco: Never   Substance Use Topics    Alcohol use: No    Drug use: No        Review of Systems    OBJECTIVE:     Anticoagulation:  no    Estimated body mass index is 25.54 kg/m² as calculated from the following:    Height as of this encounter: 5' 8" (1.727 m).    Weight as of this encounter: 76.2 kg (168 lb).    Vital Signs (Most Recent)       Physical Exam  Constitutional:       General: He is not in acute distress.     Appearance: Normal appearance. He is not ill-appearing.   HENT:      Head: Normocephalic and atraumatic.   Eyes:      General: No scleral icterus.  Pulmonary:      Effort: Pulmonary effort is normal. No respiratory distress.   Skin:     Coloration: Skin is not jaundiced.   Neurological:      General: No focal deficit present.      Mental Status: He is alert and oriented to person, place, and time.   Psychiatric:         Mood and Affect: Mood normal.         Behavior: Behavior normal.         Thought Content: Thought content normal.         BMP  Lab Results   Component Value Date     2024 "    K 4.3 02/21/2024     02/21/2024    CO2 20 (L) 02/21/2024    BUN 29 (H) 02/21/2024    CREATININE 1.9 (H) 02/21/2024    CALCIUM 9.3 02/21/2024    ANIONGAP 13 02/21/2024    ESTGFRAFRICA 49.8 (A) 06/06/2022    EGFRNONAA 43.0 (A) 06/06/2022       Lab Results   Component Value Date    WBC 11.23 02/21/2024    HGB 13.3 (L) 02/21/2024    HCT 39.2 (L) 02/21/2024    MCV 93 02/21/2024     02/21/2024       Imaging:  Per HPI    ASSESSMENT     1. Gross hematuria    2. History of prostate cancer      PLAN:     - Discussed need for gross hematuria workup   - MR Urogram given CKD   - Urine for culture and cytology   - Cystoscopy scheduled 3/19  - Recent PSA is undetectable     Mary Kate Fish MD     Letter to Blanca Hatch MD     Visit today included increased complexity associated with the care of the episodic problem prostate cancer and gross hematuria addressed and managing the longitudinal care of the patient due to the serious and/or complex managed problem(s).

## 2024-03-03 NOTE — H&P (VIEW-ONLY)
[facility] Note    PCP: Blanca Hatch MD    Chief Complaint: gross hematuria     SUBJECTIVE:       History of Present Illness:  Mina Valentine is a 84 y.o. male who presents to clinic for hematuria. He is New  to our clinic.     2 weeks ago had an episode of hematuria and passage of some tissue.     No bothersome urinary issues.     Renal US 2/20/24: no abnormal findings     Hx of prostate cancer s/p brachytherapy in 2006. No other therapy needed.     Former smoker, quit 40 years ago, 2-3 packs per day.   Former  at [employer] store.     UA today: trace blood, otherwise negative   PVR today: 22 cc    Last urine culture: no growth (2/21/24)    Lab Results   Component Value Date    CREATININE 1.9 (H) 02/21/2024     PSA, Screen (ng/mL)   Date Value   02/21/2024 <0.01     PSA Total (ng/mL)   Date Value   02/20/2017 0.02     PSA, Free (ng/mL)   Date Value   02/20/2017 <0.01     PSA, Free % (%)   Date Value   02/20/2017 Unable to calculate     Family  hx: no malignancy   Hx of HTN, HLD, CKD    Past medical, family, and social history reviewed as documented in chart with pertinent positive medical, family, and social history detailed in HPI.    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 2009    EYE SURGERY  "Bilateral 2018    cataract surgery    hydrocele  1960    INGUINAL HERNIA REPAIR Right     PROSTATE SURGERY  2007    seed implant    UMBILICAL HERNIA REPAIR  2004    belly button; had previous repair that failed, unsure how long ago     Family History   Problem Relation Age of Onset    Dementia Mother     Coronary artery disease Father 58         MI    Diabetes Father         type 1    Heart disease Father     Stroke Sister     Heart disease Sister         a fib    No Known Problems Sister     Cancer Brother 89        brain cancer    No Known Problems Daughter     No Known Problems Daughter     No Known Problems Son      Social History     Tobacco Use    Smoking status: Former     Current packs/day: 0.00     Average packs/day: 1 pack/day for 30.0 years (30.0 ttl pk-yrs)     Types: Cigarettes, Cigars     Start date: 1955     Quit date: 1985     Years since quittin.1    Smokeless tobacco: Never   Substance Use Topics    Alcohol use: No    Drug use: No        Review of Systems    OBJECTIVE:     Anticoagulation:  no    Estimated body mass index is 25.54 kg/m² as calculated from the following:    Height as of this encounter: 5' 8" (1.727 m).    Weight as of this encounter: 76.2 kg (168 lb).    Vital Signs (Most Recent)       Physical Exam  Constitutional:       General: He is not in acute distress.     Appearance: Normal appearance. He is not ill-appearing.   HENT:      Head: Normocephalic and atraumatic.   Eyes:      General: No scleral icterus.  Pulmonary:      Effort: Pulmonary effort is normal. No respiratory distress.   Skin:     Coloration: Skin is not jaundiced.   Neurological:      General: No focal deficit present.      Mental Status: He is alert and oriented to person, place, and time.   Psychiatric:         Mood and Affect: Mood normal.         Behavior: Behavior normal.         Thought Content: Thought content normal.         BMP  Lab Results   Component Value Date     2024 "    K 4.3 02/21/2024     02/21/2024    CO2 20 (L) 02/21/2024    BUN 29 (H) 02/21/2024    CREATININE 1.9 (H) 02/21/2024    CALCIUM 9.3 02/21/2024    ANIONGAP 13 02/21/2024    ESTGFRAFRICA 49.8 (A) 06/06/2022    EGFRNONAA 43.0 (A) 06/06/2022       Lab Results   Component Value Date    WBC 11.23 02/21/2024    HGB 13.3 (L) 02/21/2024    HCT 39.2 (L) 02/21/2024    MCV 93 02/21/2024     02/21/2024       Imaging:  Per HPI    ASSESSMENT     1. Gross hematuria    2. History of prostate cancer      PLAN:     - Discussed need for gross hematuria workup   - MR Urogram given CKD   - Urine for culture and cytology   - Cystoscopy scheduled 3/19  - Recent PSA is undetectable     Mary Kate Fish MD     Letter to Blanca Hatch MD     Visit today included increased complexity associated with the care of the episodic problem prostate cancer and gross hematuria addressed and managing the longitudinal care of the patient due to the serious and/or complex managed problem(s).

## 2024-03-04 ENCOUNTER — OFFICE VISIT (OUTPATIENT)
Dept: UROLOGY | Facility: CLINIC | Age: 84
End: 2024-03-04
Payer: MEDICARE

## 2024-03-04 VITALS — WEIGHT: 168 LBS | BODY MASS INDEX: 25.46 KG/M2 | HEIGHT: 68 IN

## 2024-03-04 DIAGNOSIS — R31.0 GROSS HEMATURIA: Primary | ICD-10-CM

## 2024-03-04 DIAGNOSIS — Z85.46 HISTORY OF PROSTATE CANCER: ICD-10-CM

## 2024-03-04 LAB
BILIRUBIN, UA POC OHS: NEGATIVE
BLOOD, UA POC OHS: ABNORMAL
CLARITY, UA POC OHS: CLEAR
COLOR, UA POC OHS: YELLOW
GLUCOSE, UA POC OHS: NEGATIVE
KETONES, UA POC OHS: NEGATIVE
LEUKOCYTES, UA POC OHS: NEGATIVE
NITRITE, UA POC OHS: NEGATIVE
PH, UA POC OHS: 6
POC RESIDUAL URINE VOLUME: 22 ML (ref 0–100)
PROTEIN, UA POC OHS: NEGATIVE
SPECIFIC GRAVITY, UA POC OHS: <=1.005
UROBILINOGEN, UA POC OHS: 0.2

## 2024-03-04 PROCEDURE — 88112 CYTOPATH CELL ENHANCE TECH: CPT | Performed by: PATHOLOGY

## 2024-03-04 PROCEDURE — 1126F AMNT PAIN NOTED NONE PRSNT: CPT | Mod: CPTII,S$GLB,, | Performed by: STUDENT IN AN ORGANIZED HEALTH CARE EDUCATION/TRAINING PROGRAM

## 2024-03-04 PROCEDURE — 99204 OFFICE O/P NEW MOD 45 MIN: CPT | Mod: S$GLB,,, | Performed by: STUDENT IN AN ORGANIZED HEALTH CARE EDUCATION/TRAINING PROGRAM

## 2024-03-04 PROCEDURE — 87086 URINE CULTURE/COLONY COUNT: CPT | Performed by: STUDENT IN AN ORGANIZED HEALTH CARE EDUCATION/TRAINING PROGRAM

## 2024-03-04 PROCEDURE — 1101F PT FALLS ASSESS-DOCD LE1/YR: CPT | Mod: CPTII,S$GLB,, | Performed by: STUDENT IN AN ORGANIZED HEALTH CARE EDUCATION/TRAINING PROGRAM

## 2024-03-04 PROCEDURE — 3288F FALL RISK ASSESSMENT DOCD: CPT | Mod: CPTII,S$GLB,, | Performed by: STUDENT IN AN ORGANIZED HEALTH CARE EDUCATION/TRAINING PROGRAM

## 2024-03-04 PROCEDURE — 1157F ADVNC CARE PLAN IN RCRD: CPT | Mod: CPTII,S$GLB,, | Performed by: STUDENT IN AN ORGANIZED HEALTH CARE EDUCATION/TRAINING PROGRAM

## 2024-03-04 PROCEDURE — 51798 US URINE CAPACITY MEASURE: CPT | Mod: S$GLB,,, | Performed by: STUDENT IN AN ORGANIZED HEALTH CARE EDUCATION/TRAINING PROGRAM

## 2024-03-04 PROCEDURE — 1159F MED LIST DOCD IN RCRD: CPT | Mod: CPTII,S$GLB,, | Performed by: STUDENT IN AN ORGANIZED HEALTH CARE EDUCATION/TRAINING PROGRAM

## 2024-03-04 PROCEDURE — 88112 CYTOPATH CELL ENHANCE TECH: CPT | Mod: 26,,, | Performed by: PATHOLOGY

## 2024-03-04 PROCEDURE — 81003 URINALYSIS AUTO W/O SCOPE: CPT | Mod: QW,S$GLB,, | Performed by: STUDENT IN AN ORGANIZED HEALTH CARE EDUCATION/TRAINING PROGRAM

## 2024-03-04 PROCEDURE — G2211 COMPLEX E/M VISIT ADD ON: HCPCS | Mod: S$GLB,,, | Performed by: STUDENT IN AN ORGANIZED HEALTH CARE EDUCATION/TRAINING PROGRAM

## 2024-03-04 PROCEDURE — 99999 PR PBB SHADOW E&M-EST. PATIENT-LVL III: CPT | Mod: PBBFAC,,, | Performed by: STUDENT IN AN ORGANIZED HEALTH CARE EDUCATION/TRAINING PROGRAM

## 2024-03-04 NOTE — PROGRESS NOTES
Procedure Order to Urology [[id number]]    Electronically signed by: Mary Kate Fish MD on 03/04/24 1106 Status: Active   Ordering user: Mary Kate Fish MD 03/04/24 1106 Authorized by: Mary Kate Fish MD   Ordering mode: Standard   Frequency:  03/04/24 -     Diagnoses  Gross hematuria [R31.0]   Questionnaire    Question Answer   Procedure Cystoscopy Comment - 3/19   Facility Name: [facility]   ASC, Please order Urine POCT without micro . Local sedation

## 2024-03-05 LAB
BACTERIA UR CULT: NO GROWTH
FINAL PATHOLOGIC DIAGNOSIS: ABNORMAL
Lab: ABNORMAL

## 2024-03-10 ENCOUNTER — HOSPITAL ENCOUNTER (OUTPATIENT)
Dept: RADIOLOGY | Facility: HOSPITAL | Age: 84
Discharge: HOME | End: 2024-03-10
Attending: STUDENT IN AN ORGANIZED HEALTH CARE EDUCATION/TRAINING PROGRAM
Payer: MEDICARE

## 2024-03-10 DIAGNOSIS — R31.0 GROSS HEMATURIA: ICD-10-CM

## 2024-03-10 PROCEDURE — A9585 GADOBUTROL INJECTION: HCPCS

## 2024-03-10 PROCEDURE — 72197 MRI PELVIS W/O & W/DYE: CPT | Mod: TC

## 2024-03-10 PROCEDURE — 74183 MRI ABD W/O CNTR FLWD CNTR: CPT | Mod: 26,,, | Performed by: RADIOLOGY

## 2024-03-10 PROCEDURE — 72197 MRI PELVIS W/O & W/DYE: CPT | Mod: 26,,, | Performed by: RADIOLOGY

## 2024-03-10 PROCEDURE — 25500020 PHARM REV CODE 255

## 2024-03-10 RX ORDER — GADOBUTROL 604.72 MG/ML
INJECTION INTRAVENOUS
Status: COMPLETED
Start: 2024-03-10 | End: 2024-03-10

## 2024-03-10 RX ADMIN — GADOBUTROL 7 ML: 604.72 INJECTION INTRAVENOUS at 10:03

## 2024-03-19 ENCOUNTER — HOSPITAL ENCOUNTER (OUTPATIENT)
Facility: HOSPITAL | Age: 84
Discharge: HOME | End: 2024-03-19
Attending: STUDENT IN AN ORGANIZED HEALTH CARE EDUCATION/TRAINING PROGRAM | Admitting: STUDENT IN AN ORGANIZED HEALTH CARE EDUCATION/TRAINING PROGRAM
Payer: MEDICARE

## 2024-03-19 VITALS
WEIGHT: 161 LBS | TEMPERATURE: 98 F | RESPIRATION RATE: 18 BRPM | BODY MASS INDEX: 24.4 KG/M2 | SYSTOLIC BLOOD PRESSURE: 148 MMHG | OXYGEN SATURATION: 96 % | DIASTOLIC BLOOD PRESSURE: 69 MMHG | HEIGHT: 68 IN | HEART RATE: 70 BPM

## 2024-03-19 DIAGNOSIS — R31.0 GROSS HEMATURIA: Primary | ICD-10-CM

## 2024-03-19 PROCEDURE — 36000706: Performed by: STUDENT IN AN ORGANIZED HEALTH CARE EDUCATION/TRAINING PROGRAM

## 2024-03-19 PROCEDURE — 25000003 PHARM REV CODE 250: Performed by: STUDENT IN AN ORGANIZED HEALTH CARE EDUCATION/TRAINING PROGRAM

## 2024-03-19 PROCEDURE — 52000 CYSTOURETHROSCOPY: CPT | Mod: ,,, | Performed by: STUDENT IN AN ORGANIZED HEALTH CARE EDUCATION/TRAINING PROGRAM

## 2024-03-19 PROCEDURE — 36000707: Performed by: STUDENT IN AN ORGANIZED HEALTH CARE EDUCATION/TRAINING PROGRAM

## 2024-03-19 RX ORDER — SULFAMETHOXAZOLE AND TRIMETHOPRIM 800; 160 MG/1; MG/1
1 TABLET ORAL 2 TIMES DAILY
Qty: 2 TABLET | Refills: 0 | Status: SHIPPED | OUTPATIENT
Start: 2024-03-19 | End: 2024-03-20

## 2024-03-19 RX ORDER — LIDOCAINE HYDROCHLORIDE 20 MG/ML
JELLY TOPICAL
Status: DISCONTINUED | OUTPATIENT
Start: 2024-03-19 | End: 2024-03-19 | Stop reason: HOSPADM

## 2024-03-19 NOTE — OP NOTE
[facility]  Operative Note    Date: 03/19/2024    Pre-Op Diagnosis:   - Gross hematuria  - Hx of prostate cancer s/p brachytherapy    Post-Op Diagnosis: same    Procedure(s) Performed:   1.  Cystoscopy     Specimen(s): none    Staff Surgeon: Mary Kate Fsih MD    Anesthesia: Local anesthesia topical 2% lidocaine gel    Indications: Mina Valentine is a 84 y.o. male with recent gross hematuria. He has a hx of prostate cancer s/p brachy therapy 18 years ago.     Findings:   - small brachy seed lodged in anterior prostatic tissue, pushed into the bladder  - due to small size this was unable to be retrieved however patient should be able to void this out  - no other concerning bladder findings, no tumors   - bilobar prostatic hypertrophy present     Estimated Blood Loss: min    Procedure in Detail:  After risks, benefits and possible complications of cystoscopy were explained, the patient elected to undergo the procedure and informed consent was obtained. All questions were answered in the liliya-operative area. The patient was transferred to the cystoscopy suite and placed in the dorsal lithotomy position and prepped and draped in the usual sterile fashion.  Time out was performed.     A flexible cystoscope was introduced into the bladder per urethra. This passed easily.  The entire urethra was visualized which showed no masses or strictures.  The right and left ureteral orifices were identified in the normal anatomic position and were seen effluxing clear urine.  Formal cystoscopy was performed which revealed no masses or lesions suspicious for malignancy, no trabeculations, no bladder stones and no bladder diverticuli.  There was a small brachy seed present in the anterior prostate tissue. This was pushed into the bladder.     The patient tolerated the procedure well and was transferred to recovery in stable condition.    Disposition:  The patient will follow up in 6 months.      Mary Kate Fish,  MD

## 2024-03-19 NOTE — INTERVAL H&P NOTE
The patient has been examined and the H&P has been reviewed:    I concur with the findings and no changes have occurred since H&P was written.    Procedure risks, benefits and alternative options discussed and understood by patient/family.      MRI and cytology normal    There are no hospital problems to display for this patient.

## 2024-03-19 NOTE — DISCHARGE SUMMARY
[facility]  Discharge Note  Short Stay    Admit Date: 3/19/2024    Discharge Date and Time: 03/19/2024 11:20 AM      Attending Physician: Mary Kate Fish MD     Discharge Provider: Mary Kate Fish    Diagnoses:  Active Hospital Problems    Diagnosis  POA    *Gross hematuria [R31.0]  Yes      Resolved Hospital Problems   No resolved problems to display.       Discharged Condition: good    Hospital Course: Patient was admitted for cysto and tolerated the procedure well with no complications. The patient was discharged home in good condition on the same day.       Final Diagnoses: Same as principal problem.    Disposition: Home or Self Care    Follow up/Patient Instructions:    Medications:  Reconciled Home Medications:   Current Discharge Medication List        START taking these medications    Details   sulfamethoxazole-trimethoprim 800-160mg (BACTRIM DS) 800-160 mg Tab Take 1 tablet by mouth 2 (two) times daily. for 1 day  Qty: 2 tablet, Refills: 0           CONTINUE these medications which have NOT CHANGED    Details   carvediloL (COREG) 3.125 MG tablet TAKE 1 TABLET TWICE DAILY WITH MEALS  Qty: 180 tablet, Refills: 3      cloNIDine (CATAPRES) 0.1 MG tablet Take 1 tablet (0.1 mg total) by mouth every 4 (four) hours as needed (blood pressure >180 systolic of >100). Blood pressure over 180 on TOP (systolic)) or over 95 on BOTTOM (diastolic)  Qty: 90 tablet, Refills: 1    Comments: .  Associated Diagnoses: Primary hypertension      latanoprost 0.005 % ophthalmic solution       levothyroxine (SYNTHROID) 75 MCG tablet TAKE 1 TABLET EVERY DAY ( NEED APPOINTMENT AND LAB )  Qty: 90 tablet, Refills: 1    Associated Diagnoses: Hypothyroidism, unspecified type      NIFEdipine (PROCARDIA-XL) 60 MG (OSM) 24 hr tablet Take 1 tablet (60 mg total) by mouth 2 (two) times daily.  Qty: 180 tablet, Refills: 3    Comments: .  Associated Diagnoses: Primary hypertension      omega 3-dha-epa-fish oil 120-180-500 mg Cap  Take 1 capsule by mouth once daily at 6am.      pravastatin (PRAVACHOL) 40 MG tablet TAKE 1 TABLET EVERY DAY  Qty: 90 tablet, Refills: 3      valsartan (DIOVAN) 320 MG tablet TAKE 1 TABLET EVERY MORNING  Qty: 90 tablet, Refills: 3    Comments: .  Associated Diagnoses: Primary hypertension      fexofenadine (ALLEGRA) 180 MG tablet Take 1 tablet (180 mg total) by mouth once daily. For allergies  Qty: 90 tablet, Refills: 3    Associated Diagnoses: Non-seasonal allergic rhinitis due to pollen           Discharge Procedure Orders   Activity as tolerated      Follow-up Information       Mary Kate Fish MD Follow up in 6 month(s).    Specialty: Urology  Why: assess symptoms  Contact information:  [address] [place] [id number] [phone]                             Mary Kate Fish MD  Urology Department

## 2024-03-20 ENCOUNTER — LAB VISIT (OUTPATIENT)
Dept: FAMILY MEDICINE | Facility: CLINIC | Age: 84
End: 2024-03-20
Payer: MEDICARE

## 2024-03-20 DIAGNOSIS — I10 PRIMARY HYPERTENSION: ICD-10-CM

## 2024-03-20 DIAGNOSIS — N18.32 STAGE 3B CHRONIC KIDNEY DISEASE: ICD-10-CM

## 2024-03-20 LAB
ALBUMIN SERPL BCP-MCNC: 3.3 G/DL (ref 3.5–5.2)
ANION GAP SERPL CALC-SCNC: 10 MMOL/L (ref 8–16)
BUN SERPL-MCNC: 27 MG/DL (ref 8–23)
CALCIUM SERPL-MCNC: 9.3 MG/DL (ref 8.7–10.5)
CHLORIDE SERPL-SCNC: 105 MMOL/L (ref 95–110)
CO2 SERPL-SCNC: 21 MMOL/L (ref 23–29)
CREAT SERPL-MCNC: 1.9 MG/DL (ref 0.5–1.4)
EST. GFR  (NO RACE VARIABLE): 34.6 ML/MIN/1.73 M^2
GLUCOSE SERPL-MCNC: 132 MG/DL (ref 70–110)
PHOSPHATE SERPL-MCNC: 2.2 MG/DL (ref 2.7–4.5)
POTASSIUM SERPL-SCNC: 4.2 MMOL/L (ref 3.5–5.1)
SODIUM SERPL-SCNC: 136 MMOL/L (ref 136–145)

## 2024-03-20 PROCEDURE — 80069 RENAL FUNCTION PANEL: CPT | Performed by: FAMILY MEDICINE

## 2024-03-22 DIAGNOSIS — N18.32 STAGE 3B CHRONIC KIDNEY DISEASE: Primary | ICD-10-CM

## 2024-03-25 ENCOUNTER — OFFICE VISIT (OUTPATIENT)
Dept: FAMILY MEDICINE | Facility: CLINIC | Age: 84
End: 2024-03-25
Payer: MEDICARE

## 2024-03-25 ENCOUNTER — PATIENT MESSAGE (OUTPATIENT)
Dept: ADMINISTRATIVE | Facility: OTHER | Age: 84
End: 2024-03-25
Payer: MEDICARE

## 2024-03-25 VITALS
DIASTOLIC BLOOD PRESSURE: 68 MMHG | HEIGHT: 68 IN | OXYGEN SATURATION: 98 % | BODY MASS INDEX: 25.59 KG/M2 | WEIGHT: 168.88 LBS | SYSTOLIC BLOOD PRESSURE: 130 MMHG | HEART RATE: 66 BPM

## 2024-03-25 DIAGNOSIS — R05.2 SUBACUTE COUGH: ICD-10-CM

## 2024-03-25 DIAGNOSIS — J30.1 NON-SEASONAL ALLERGIC RHINITIS DUE TO POLLEN: ICD-10-CM

## 2024-03-25 DIAGNOSIS — N18.32 STAGE 3B CHRONIC KIDNEY DISEASE: ICD-10-CM

## 2024-03-25 DIAGNOSIS — R31.0 GROSS HEMATURIA: ICD-10-CM

## 2024-03-25 DIAGNOSIS — I10 PRIMARY HYPERTENSION: Primary | ICD-10-CM

## 2024-03-25 DIAGNOSIS — K21.9 GASTROESOPHAGEAL REFLUX DISEASE, UNSPECIFIED WHETHER ESOPHAGITIS PRESENT: ICD-10-CM

## 2024-03-25 DIAGNOSIS — Z85.46 HISTORY OF PROSTATE CANCER: ICD-10-CM

## 2024-03-25 PROCEDURE — G2211 COMPLEX E/M VISIT ADD ON: HCPCS | Mod: S$GLB,,, | Performed by: FAMILY MEDICINE

## 2024-03-25 PROCEDURE — 99999 PR PBB SHADOW E&M-EST. PATIENT-LVL IV: CPT | Mod: PBBFAC,,, | Performed by: FAMILY MEDICINE

## 2024-03-25 PROCEDURE — 3288F FALL RISK ASSESSMENT DOCD: CPT | Mod: CPTII,S$GLB,, | Performed by: FAMILY MEDICINE

## 2024-03-25 PROCEDURE — 1157F ADVNC CARE PLAN IN RCRD: CPT | Mod: CPTII,S$GLB,, | Performed by: FAMILY MEDICINE

## 2024-03-25 PROCEDURE — 99214 OFFICE O/P EST MOD 30 MIN: CPT | Mod: S$GLB,,, | Performed by: FAMILY MEDICINE

## 2024-03-25 PROCEDURE — 1126F AMNT PAIN NOTED NONE PRSNT: CPT | Mod: CPTII,S$GLB,, | Performed by: FAMILY MEDICINE

## 2024-03-25 PROCEDURE — 3078F DIAST BP <80 MM HG: CPT | Mod: CPTII,S$GLB,, | Performed by: FAMILY MEDICINE

## 2024-03-25 PROCEDURE — 1160F RVW MEDS BY RX/DR IN RCRD: CPT | Mod: CPTII,S$GLB,, | Performed by: FAMILY MEDICINE

## 2024-03-25 PROCEDURE — 3075F SYST BP GE 130 - 139MM HG: CPT | Mod: CPTII,S$GLB,, | Performed by: FAMILY MEDICINE

## 2024-03-25 PROCEDURE — 1159F MED LIST DOCD IN RCRD: CPT | Mod: CPTII,S$GLB,, | Performed by: FAMILY MEDICINE

## 2024-03-25 PROCEDURE — 1101F PT FALLS ASSESS-DOCD LE1/YR: CPT | Mod: CPTII,S$GLB,, | Performed by: FAMILY MEDICINE

## 2024-03-25 RX ORDER — FAMOTIDINE 40 MG/1
40 TABLET, FILM COATED ORAL 2 TIMES DAILY
Qty: 60 TABLET | Refills: 2 | Status: SHIPPED | OUTPATIENT
Start: 2024-03-25 | End: 2025-03-25

## 2024-03-25 RX ORDER — FAMOTIDINE 40 MG/1
40 TABLET, FILM COATED ORAL 2 TIMES DAILY
Qty: 60 TABLET | Refills: 2 | Status: CANCELLED | OUTPATIENT
Start: 2024-03-25 | End: 2025-03-25

## 2024-03-25 NOTE — PROGRESS NOTES
Subjective:       Patient ID: Mina Valentine is a 84 y.o. male.    Chief Complaint: Follow-up (Pt is here for follow up on lab)    84-year-old white male past medical history of hypertension and prostate cancer here today for follow-up.  Recent labs resulted in [facility] were reviewed in detail with patient and all questions answered to his satisfaction.    Patient without any acute complaints.  Recently underwent workup for gross hematuria, and during cystoscopy it was noted that 1 of the radiation seeds that were implanted into the prostate was the source of gross hematuria.  Patient states later that day he passed this seed at home.  Denies any flank pain, denies any further hematuria, denies any problems with urination.    Patient states he has had a dry cough lasting several weeks.  A Bit of hoarseness as well.  Family member pointed out the cough, and recommended patient consider a chest x-ray.  Patient denies any fever, chills, shortness of breath.  Denies sputum production.  Denies PND or PATTON.    Estimated GFR decreased to 34, labs resulted 03/20/2024.  This was after patient had MRI urography with and without contrast.  Patient denies any change in energy level or urine output.    Blood pressure has been well controlled, he participates in the digital medicine program through [facility].          3/25/2024     9:36 AM 4/5/2023     9:01 AM 2/24/2022    10:36 AM 7/26/2021    10:56 AM 5/18/2018    10:00 AM 10/18/2016     9:45 AM 3/31/2015     9:14 AM   Depression Patient Health Questionnaire   Over the last two weeks how often have you been bothered by little interest or pleasure in doing things Not at all Not at all Not at all Not at all Not at all Not at all Not at all   Over the last two weeks how often have you been bothered by feeling down, depressed or hopeless Not at all Not at all Not at all Not at all Not at all Not at all Not at all   PHQ-2 Total Score 0 0 0 0 0 0 0   Over the last two weeks how often  have you been bothered by trouble falling or staying asleep, or sleeping too much      Not at all Not at all   Over the last two weeks how often have you been bothered by feeling tired or having little energy      Not at all Not at all   Over the last two weeks how often have you been bothered by a poor appetite or overeating      Not at all Not at all   Over the last two weeks how often have you been bothered by feeling bad about yourself - or that you are a failure or have let yourself or your family down      Not at all Not at all   Over the last two weeks how often have you been bothered by trouble concentrating on things, such as reading the newspaper or watching television      Not at all Not at all   Over the last two weeks how often have you been bothered by moving or speaking so slowly that other people could have noticed. Or the opposite - being so fidgety or restless that you have been moving around a lot more than usual.      Not at all Not at all   Over the last two weeks how often have you been bothered by thoughts that you would be better off dead, or of hurting yourself      Not at all Not at all   If you checked off any problems, how difficult have these problems made it for you to do your work, take care of things at home or get along with other people?      Not difficult at all Not difficult at all   PHQ-9 Score      0 0          No data to display                Review of Systems   HENT:  Positive for postnasal drip.    Gastrointestinal:         Occasional heartburn or acid reflux symptoms--mild   All other systems reviewed and are negative.        Past Medical History:   Diagnosis Date    Basal cell carcinoma of face 2014, mohs    left medial cheek, right upper cheek    Deviated septum     History of glaucoma     History of prostate cancer 02/10/2016    History of umbilical hernia repair     HTN (hypertension)     Hyperlipidemia     Hypothyroidism     hypothyroidism    Ocular hypertension  10/18/2016    Prostate cancer     seed implants      Past Surgical History:   Procedure Laterality Date    brachytherapy for prostate cancer      [place], [place]    CARDIOVASCULAR STRESS TEST       estimated    COLONOSCOPY      no polyps approx. 2009    CYSTOSCOPY N/A 3/19/2024    Procedure: CYSTOSCOPY;  Surgeon: Mary Kate Fish MD;  Location: [facility] OR;  Service: Urology;  Laterality: N/A;    EYE SURGERY Bilateral 2018    cataract surgery    hydrocele  1960    INGUINAL HERNIA REPAIR Right     PROSTATE SURGERY      seed implant    UMBILICAL HERNIA REPAIR      belly button; had previous repair that failed, unsure how long ago     Family History   Problem Relation Age of Onset    Dementia Mother     Coronary artery disease Father 58         MI    Diabetes Father         type 1    Heart disease Father     Stroke Sister     Heart disease Sister         a fib    No Known Problems Sister     Cancer Brother 89        brain cancer    No Known Problems Daughter     No Known Problems Daughter     No Known Problems Son        Review of patient's allergies indicates:   Allergen Reactions    Penicillins Rash     Other reaction(s): Flushing (skin)       Current Outpatient Medications:     carvediloL (COREG) 3.125 MG tablet, TAKE 1 TABLET TWICE DAILY WITH MEALS, Disp: 180 tablet, Rfl: 3    cloNIDine (CATAPRES) 0.1 MG tablet, Take 1 tablet (0.1 mg total) by mouth every 4 (four) hours as needed (blood pressure >180 systolic of >100). Blood pressure over 180 on TOP (systolic)) or over 95 on BOTTOM (diastolic), Disp: 90 tablet, Rfl: 1    latanoprost 0.005 % ophthalmic solution, , Disp: , Rfl:     levothyroxine (SYNTHROID) 75 MCG tablet, TAKE 1 TABLET EVERY DAY ( NEED APPOINTMENT AND LAB ), Disp: 90 tablet, Rfl: 1    NIFEdipine (PROCARDIA-XL) 60 MG (OSM) 24 hr tablet, Take 1 tablet (60 mg total) by mouth 2 (two) times daily., Disp: 180 tablet, Rfl: 3    omega 3-dha-epa-fish oil 120-180-500 mg Cap, Take  "1 capsule by mouth once daily at 6am., Disp: , Rfl:     pravastatin (PRAVACHOL) 40 MG tablet, TAKE 1 TABLET EVERY DAY, Disp: 90 tablet, Rfl: 3    valsartan (DIOVAN) 320 MG tablet, TAKE 1 TABLET EVERY MORNING, Disp: 90 tablet, Rfl: 3    famotidine (PEPCID) 40 MG tablet, Take 1 tablet (40 mg total) by mouth 2 (two) times daily. For acid reflux, Disp: 60 tablet, Rfl: 2    famotidine (PEPCID) 40 MG tablet, Take 1 tablet (40 mg total) by mouth 2 (two) times daily. For reflux, Disp: 60 tablet, Rfl: 2    fexofenadine (ALLEGRA) 180 MG tablet, Take 1 tablet (180 mg total) by mouth once daily. For allergies, Disp: 90 tablet, Rfl: 3    loratadine (CLARITIN) 10 mg tablet, Take 1 tablet (10 mg total) by mouth once daily. For allergies. Can increase to twice daily if severe allergies., Disp: 30 tablet, Rfl: 2      Objective:      /68 (BP Location: Right arm, Patient Position: Sitting, BP Method: Medium (Manual))   Pulse 66   Ht 5' 8" (1.727 m)   Wt 76.6 kg (168 lb 14 oz)   SpO2 98%   BMI 25.68 kg/m²   Physical Exam  Vitals and nursing note reviewed.   Constitutional:       General: He is not in acute distress.     Appearance: Normal appearance. He is well-developed and normal weight. He is not ill-appearing, toxic-appearing or diaphoretic.   HENT:      Nose: Nose normal. No rhinorrhea.      Mouth/Throat:      Mouth: Mucous membranes are moist.      Pharynx: No oropharyngeal exudate or posterior oropharyngeal erythema.   Eyes:      General: No scleral icterus.        Right eye: No discharge.         Left eye: No discharge.      Extraocular Movements: Extraocular movements intact.      Conjunctiva/sclera: Conjunctivae normal.      Pupils: Pupils are equal, round, and reactive to light.   Neck:      Thyroid: No thyromegaly.      Vascular: No carotid bruit or JVD.      Trachea: No tracheal deviation.   Cardiovascular:      Rate and Rhythm: Normal rate and regular rhythm.      Pulses: Normal pulses.      Heart sounds: " Normal heart sounds.   Pulmonary:      Effort: Pulmonary effort is normal. No respiratory distress.      Breath sounds: Normal breath sounds. No wheezing or rhonchi.   Abdominal:      General: There is no distension.   Musculoskeletal:      Cervical back: Neck supple. No tenderness. No muscular tenderness.   Lymphadenopathy:      Cervical: No cervical adenopathy.   Skin:     General: Skin is warm and dry.      Capillary Refill: Capillary refill takes less than 2 seconds.      Coloration: Skin is not jaundiced or pale.   Neurological:      General: No focal deficit present.      Mental Status: He is alert and oriented to person, place, and time. Mental status is at baseline.      Gait: Gait normal.   Psychiatric:         Mood and Affect: Mood normal.         Behavior: Behavior normal.         Thought Content: Thought content normal.         Judgment: Judgment normal.         Assessment:       1. Primary hypertension    2. Subacute cough    3. Stage 3b chronic kidney disease    4. History of prostate cancer    5. Non-seasonal allergic rhinitis due to pollen    6. Gastroesophageal reflux disease, unspecified whether esophagitis present    7. Gross hematuria        Plan:       Primary hypertension  -     Ambulatory referral/consult to Nephrology; Future; Expected date: 04/01/2024  Blood pressure stable, continue current meds    Subacute cough  -     X-Ray Chest PA And Lateral; Future; Expected date: 03/25/2024  -     famotidine (PEPCID) 40 MG tablet; Take 1 tablet (40 mg total) by mouth 2 (two) times daily. For acid reflux  Dispense: 60 tablet; Refill: 2  -     famotidine (PEPCID) 40 MG tablet; Take 1 tablet (40 mg total) by mouth 2 (two) times daily. For reflux  Dispense: 60 tablet; Refill: 2  -     loratadine (CLARITIN) 10 mg tablet; Take 1 tablet (10 mg total) by mouth once daily. For allergies. Can increase to twice daily if severe allergies.  Dispense: 30 tablet; Refill: 2  Discussed causes of the persistent cough,  which could be secondary to allergic rhinitis or secondary to reflux.  Recommend x-ray to fully evaluate.  Patient desires to initiate medications above and follow-up with me in a week or 2.     Stage 3b chronic kidney disease  -     Ambulatory referral/consult to Nephrology; Future; Expected date: 04/01/2024  Repeat renal panel.  Suspect GFR decreased due to the MRI with contrast.  Referral to Nephrology was placed last year, but patient never received a call.  Updated referral has been placed.  Recommend continuing current medications and continuing to monitor her blood pressure.    History of prostate cancer  -     Ambulatory referral/consult to Nephrology; Future; Expected date: 04/01/2024  Important to note regarding nephrology referral.    Non-seasonal allergic rhinitis due to pollen  -     loratadine (CLARITIN) 10 mg tablet; Take 1 tablet (10 mg total) by mouth once daily. For allergies. Can increase to twice daily if severe allergies.  Dispense: 30 tablet; Refill: 2    Gastroesophageal reflux disease, unspecified whether esophagitis present  -     famotidine (PEPCID) 40 MG tablet; Take 1 tablet (40 mg total) by mouth 2 (two) times daily. For reflux  Dispense: 60 tablet; Refill: 2    Gross hematuria  Resolved.  Workup revealed the source was radiation prostate seed which has been passed at home.          Previous records in Epic were reviewed, including the last 3 months of encounters, imaging, laboratory, and pathology reports.    Strict return precautions reviewed and patient verbalized understanding. Risks, benefits, and alternatives to the plan were reviewed in detail and all questions answered to the patient's satisfaction. Patient agrees to return to clinic or ER if symptoms worsen. 30 minutes total were spent on today's visit, not limited to but including time based on counseling and coordination of care.    Patient instructed that best way to communicate with my office staff is for patient to get on  the [facility] epic patient portal to expedite communication and communication issues that may occur.  Patient was given instructions on how to get on the portal.  I encouraged patient to obtain portal access as well.  Ultimately it is up to the patient to obtain access.  Patient voiced understanding.    This note was created using [employer] voice recognition software that occasionally may misinterpret phrases or words.    Follow up in about 4 months (around 7/25/2024) for Hypertension/chronic kidney disease.

## 2024-03-26 ENCOUNTER — PATIENT MESSAGE (OUTPATIENT)
Dept: FAMILY MEDICINE | Facility: CLINIC | Age: 84
End: 2024-03-26
Payer: MEDICARE

## 2024-03-26 DIAGNOSIS — R05.2 SUBACUTE COUGH: ICD-10-CM

## 2024-03-26 RX ORDER — LORATADINE 10 MG/1
10 TABLET ORAL DAILY
Qty: 30 TABLET | Refills: 2 | Status: SHIPPED | OUTPATIENT
Start: 2024-03-26 | End: 2025-03-26

## 2024-03-26 RX ORDER — FAMOTIDINE 40 MG/1
40 TABLET, FILM COATED ORAL 2 TIMES DAILY
Qty: 60 TABLET | Refills: 2 | OUTPATIENT
Start: 2024-03-26 | End: 2025-03-26

## 2024-03-26 RX ORDER — FAMOTIDINE 40 MG/1
40 TABLET, FILM COATED ORAL 2 TIMES DAILY
Qty: 60 TABLET | Refills: 2 | Status: SHIPPED | OUTPATIENT
Start: 2024-03-26 | End: 2025-03-26

## 2024-03-26 NOTE — TELEPHONE ENCOUNTER
Medication was sent to [facility]. Please send to [facility] in chart. Thank you.

## 2024-03-26 NOTE — TELEPHONE ENCOUNTER
----- Message from Jud Wong sent at 3/26/2024  2:22 PM CDT -----  Type:  Pharmacy Calling to Clarify an RX    Name of Caller:  Vikas  Pharmacy Name:    [id number] - [place], [place] - [address]  [address]  [place] [address]  Phone: [phone] Fax: [phone]  Prescription Name:  famotidine (PEPCID) 40 MG tablet  What do they need to clarify?:  was supposed to go to local pharmacy  Best Call Back Number:    Additional Information:

## 2024-04-03 ENCOUNTER — HOSPITAL ENCOUNTER (OUTPATIENT)
Dept: RADIOLOGY | Facility: HOSPITAL | Age: 84
Discharge: HOME | End: 2024-04-03
Attending: FAMILY MEDICINE
Payer: MEDICARE

## 2024-04-03 ENCOUNTER — LAB VISIT (OUTPATIENT)
Dept: FAMILY MEDICINE | Facility: CLINIC | Age: 84
End: 2024-04-03
Payer: MEDICARE

## 2024-04-03 DIAGNOSIS — R05.2 SUBACUTE COUGH: ICD-10-CM

## 2024-04-03 DIAGNOSIS — N18.32 STAGE 3B CHRONIC KIDNEY DISEASE: ICD-10-CM

## 2024-04-03 LAB
ALBUMIN SERPL BCP-MCNC: 3.3 G/DL (ref 3.5–5.2)
ANION GAP SERPL CALC-SCNC: 7 MMOL/L (ref 8–16)
BUN SERPL-MCNC: 27 MG/DL (ref 8–23)
CALCIUM SERPL-MCNC: 9.2 MG/DL (ref 8.7–10.5)
CHLORIDE SERPL-SCNC: 107 MMOL/L (ref 95–110)
CO2 SERPL-SCNC: 23 MMOL/L (ref 23–29)
CREAT SERPL-MCNC: 1.7 MG/DL (ref 0.5–1.4)
EST. GFR  (NO RACE VARIABLE): 39.5 ML/MIN/1.73 M^2
GLUCOSE SERPL-MCNC: 111 MG/DL (ref 70–110)
PHOSPHATE SERPL-MCNC: 2.3 MG/DL (ref 2.7–4.5)
POTASSIUM SERPL-SCNC: 4.4 MMOL/L (ref 3.5–5.1)
SODIUM SERPL-SCNC: 137 MMOL/L (ref 136–145)

## 2024-04-03 PROCEDURE — 80069 RENAL FUNCTION PANEL: CPT | Performed by: FAMILY MEDICINE

## 2024-04-03 PROCEDURE — 71046 X-RAY EXAM CHEST 2 VIEWS: CPT | Mod: 26,,, | Performed by: RADIOLOGY

## 2024-04-03 PROCEDURE — 71046 X-RAY EXAM CHEST 2 VIEWS: CPT | Mod: TC

## 2024-04-06 DIAGNOSIS — N18.32 STAGE 3B CHRONIC KIDNEY DISEASE: Primary | ICD-10-CM

## 2024-04-06 NOTE — PROGRESS NOTES
Standing order for renal panel has been placed.  Recommend patient recheck lab med may due to decreased GFR after IV contrast.

## 2024-05-13 DIAGNOSIS — I10 PRIMARY HYPERTENSION: ICD-10-CM

## 2024-05-13 RX ORDER — NIFEDIPINE 60 MG/1
60 TABLET, EXTENDED RELEASE ORAL 2 TIMES DAILY
Qty: 180 TABLET | Refills: 3 | Status: SHIPPED | OUTPATIENT
Start: 2024-05-13

## 2024-05-13 RX ORDER — CARVEDILOL 3.12 MG/1
TABLET ORAL
Qty: 180 TABLET | Refills: 3 | Status: SHIPPED | OUTPATIENT
Start: 2024-05-13

## 2024-05-13 NOTE — TELEPHONE ENCOUNTER
Refill Decision Note   Mina Meme  is requesting a refill authorization.  Brief Assessment and Rationale for Refill:  Approve     Medication Therapy Plan:         Comments:     Note composed:12:01 PM 05/13/2024

## 2024-05-13 NOTE — TELEPHONE ENCOUNTER
No care due was identified.  [facility] Embedded Care Due Messages. Reference number: [id number].   5/13/2024 8:04:57 AM CDT

## 2024-06-14 ENCOUNTER — LAB VISIT (OUTPATIENT)
Dept: FAMILY MEDICINE | Facility: CLINIC | Age: 84
End: 2024-06-14
Payer: MEDICARE

## 2024-06-14 DIAGNOSIS — N18.32 STAGE 3B CHRONIC KIDNEY DISEASE: ICD-10-CM

## 2024-06-14 LAB
ALBUMIN SERPL BCP-MCNC: 3.6 G/DL (ref 3.5–5.2)
ANION GAP SERPL CALC-SCNC: 12 MMOL/L (ref 8–16)
BUN SERPL-MCNC: 26 MG/DL (ref 8–23)
CALCIUM SERPL-MCNC: 9.3 MG/DL (ref 8.7–10.5)
CHLORIDE SERPL-SCNC: 108 MMOL/L (ref 95–110)
CO2 SERPL-SCNC: 18 MMOL/L (ref 23–29)
CREAT SERPL-MCNC: 1.5 MG/DL (ref 0.5–1.4)
EST. GFR  (NO RACE VARIABLE): 45.9 ML/MIN/1.73 M^2
GLUCOSE SERPL-MCNC: 114 MG/DL (ref 70–110)
PHOSPHATE SERPL-MCNC: 2.6 MG/DL (ref 2.7–4.5)
POTASSIUM SERPL-SCNC: 4.3 MMOL/L (ref 3.5–5.1)
SODIUM SERPL-SCNC: 138 MMOL/L (ref 136–145)

## 2024-06-14 PROCEDURE — 80069 RENAL FUNCTION PANEL: CPT | Performed by: FAMILY MEDICINE

## 2024-07-25 ENCOUNTER — TELEPHONE (OUTPATIENT)
Dept: FAMILY MEDICINE | Facility: CLINIC | Age: 84
End: 2024-07-25
Payer: MEDICARE

## 2024-07-25 NOTE — TELEPHONE ENCOUNTER
----- Message from Delam Peña sent at 7/25/2024  3:21 PM CDT -----  Regarding: Needs return call  Type: Needs Medical Advice  Who Called:  Pt    Best Call Back Number: [phone]    Additional Information: Pt received a email saying it was time to schedule his appt for this summer, please call to advise to see what this is supposed to do

## 2024-07-26 ENCOUNTER — PATIENT MESSAGE (OUTPATIENT)
Dept: FAMILY MEDICINE | Facility: CLINIC | Age: 84
End: 2024-07-26
Payer: MEDICARE

## 2024-07-26 DIAGNOSIS — Z00.00 ANNUAL PHYSICAL EXAM: ICD-10-CM

## 2024-07-26 DIAGNOSIS — I10 PRIMARY HYPERTENSION: ICD-10-CM

## 2024-07-26 DIAGNOSIS — Z85.46 HISTORY OF PROSTATE CANCER: ICD-10-CM

## 2024-07-26 DIAGNOSIS — Z12.5 ENCOUNTER FOR SCREENING FOR MALIGNANT NEOPLASM OF PROSTATE: ICD-10-CM

## 2024-07-26 DIAGNOSIS — R73.9 HYPERGLYCEMIA: ICD-10-CM

## 2024-07-26 DIAGNOSIS — E78.2 MIXED HYPERLIPIDEMIA: ICD-10-CM

## 2024-07-26 DIAGNOSIS — E03.9 ACQUIRED HYPOTHYROIDISM: ICD-10-CM

## 2024-07-26 DIAGNOSIS — N18.32 STAGE 3B CHRONIC KIDNEY DISEASE: Primary | ICD-10-CM

## 2024-08-01 ENCOUNTER — PATIENT MESSAGE (OUTPATIENT)
Dept: ADMINISTRATIVE | Facility: OTHER | Age: 84
End: 2024-08-01
Payer: MEDICARE

## 2024-08-02 NOTE — TELEPHONE ENCOUNTER
"61 yo POD1 s/p left TKA. Patient is feeling better, less fatigue this morning. No new complaints at this time. No incidents overnight.     Afeb VSS. Blood pressure 137/76, pulse 84, temperature 98.6 °F (37 °C), resp. rate 18, height 4' 11\" (1.499 m), weight 87.1 kg (192 lb), SpO2 97%.,Body mass index is 38.78 kg/m².    Recent Labs     08/02/24  0537   WBC 6.00   HGB 11.5   *   SODIUM 137   K 3.6      CO2 26   BUN 8   CREATININE 0.58*   GLUC 156*   CALCIUM 8.3*   AST 12*   ALT 9   ALKPHOS 44   TBILI 0.52     HV drain - 25 cc overnight, 265 cc total  Dressing C/D/I. Motor and sensation grossly intact.     ALIDA FerroC   " Mukund Prescott,  Please review this Rx refill request for this patient of Dr. Novak in her absence.   Last office visit: 7/26/2023  Upcoming Appointment: 12/20/2023  Thank you  Signed:  WHITNEY Schaefer Staff (supporting Blanca Hatch MD)3 days ago       I had a high bp reading last wednesday 171/70 .It is back to normal now but would you  send a prescription for clonidine to [facility] just to be on the safe side. thanks hope you are doing well.

## 2024-08-07 DIAGNOSIS — E03.9 HYPOTHYROIDISM, UNSPECIFIED TYPE: ICD-10-CM

## 2024-08-07 DIAGNOSIS — I10 PRIMARY HYPERTENSION: ICD-10-CM

## 2024-08-08 RX ORDER — VALSARTAN 320 MG/1
320 TABLET ORAL EVERY MORNING
Qty: 90 TABLET | Refills: 3 | Status: SHIPPED | OUTPATIENT
Start: 2024-08-08

## 2024-08-08 RX ORDER — LEVOTHYROXINE SODIUM 75 UG/1
TABLET ORAL
Qty: 90 TABLET | Refills: 1 | Status: SHIPPED | OUTPATIENT
Start: 2024-08-08

## 2024-08-10 NOTE — TELEPHONE ENCOUNTER
See orders. Fasting labs before November wellness visit. Schedule labs for any [facility] location (pt lives in [place].)

## 2024-09-03 ENCOUNTER — NURSE TRIAGE (OUTPATIENT)
Dept: ADMINISTRATIVE | Facility: CLINIC | Age: 84
End: 2024-09-03
Payer: MEDICARE

## 2024-09-03 NOTE — TELEPHONE ENCOUNTER
Pt states he started with symptoms 6 days ago, and tested positive 3 days ago for covid.  Care advice states to call your pcp within 24 hours.  Patient verbally understands, all questions answered, advised to call back for any worsening symptoms or further needs.     Reason for Disposition   [1] HIGH RISK patient (e.g., weak immune system, age > 64 years, obesity with BMI 30 or higher, pregnant, chronic lung disease or other chronic medical condition) AND [2] COVID symptoms (e.g., cough, fever)  (Exceptions: Already seen by PCP and no new or worsening symptoms.)    Additional Information   Negative: SEVERE difficulty breathing (e.g., struggling for each breath, speaks in single words)   Negative: Difficult to awaken or acting confused (e.g., disoriented, slurred speech)   Negative: Bluish (or gray) lips or face now   Negative: Shock suspected (e.g., cold/pale/clammy skin, too weak to stand, low BP, rapid pulse)   Negative: Sounds like a life-threatening emergency to the triager   Negative: SEVERE or constant chest pain or pressure  (Exception: Mild central chest pain, present only when coughing.)   Negative: MODERATE difficulty breathing (e.g., speaks in phrases, SOB even at rest, pulse 100-120)   Negative: [1] Headache AND [2] stiff neck (can't touch chin to chest)   Negative: Oxygen level (e.g., pulse oximetry) 90 percent or lower   Negative: Chest pain or pressure  (Exception: MILD central chest pain, present only when coughing.)   Negative: [1] Drinking very little AND [2] dehydration suspected (e.g., no urine > 12 hours, very dry mouth, very lightheaded)   Negative: Patient sounds very sick or weak to the triager   Negative: MILD difficulty breathing (e.g., minimal/no SOB at rest, SOB with walking, pulse <100)   Negative: Fever > 103 F (39.4 C)   Negative: [1] Fever > 101 F (38.3 C) AND [2] age > 60 years   Negative: [1] Fever > 100.0 F (37.8 C) AND [2] bedridden (e.g., CVA, chronic illness, recovering from  surgery)   Negative: Oxygen level (e.g., pulse oximetry) 91 to 94 percent    Protocols used: Coronavirus (COVID-19) Diagnosed or [id number]

## 2024-09-16 ENCOUNTER — OFFICE VISIT (OUTPATIENT)
Dept: FAMILY MEDICINE | Facility: CLINIC | Age: 84
End: 2024-09-16
Payer: MEDICARE

## 2024-09-16 VITALS
DIASTOLIC BLOOD PRESSURE: 62 MMHG | HEIGHT: 68 IN | OXYGEN SATURATION: 98 % | WEIGHT: 166 LBS | SYSTOLIC BLOOD PRESSURE: 138 MMHG | HEART RATE: 69 BPM | BODY MASS INDEX: 25.16 KG/M2

## 2024-09-16 DIAGNOSIS — R05.8 DRY COUGH: Primary | ICD-10-CM

## 2024-09-16 PROCEDURE — 1159F MED LIST DOCD IN RCRD: CPT | Mod: CPTII,S$GLB,, | Performed by: STUDENT IN AN ORGANIZED HEALTH CARE EDUCATION/TRAINING PROGRAM

## 2024-09-16 PROCEDURE — 99214 OFFICE O/P EST MOD 30 MIN: CPT | Mod: S$GLB,,, | Performed by: STUDENT IN AN ORGANIZED HEALTH CARE EDUCATION/TRAINING PROGRAM

## 2024-09-16 PROCEDURE — 3075F SYST BP GE 130 - 139MM HG: CPT | Mod: CPTII,S$GLB,, | Performed by: STUDENT IN AN ORGANIZED HEALTH CARE EDUCATION/TRAINING PROGRAM

## 2024-09-16 PROCEDURE — 1126F AMNT PAIN NOTED NONE PRSNT: CPT | Mod: CPTII,S$GLB,, | Performed by: STUDENT IN AN ORGANIZED HEALTH CARE EDUCATION/TRAINING PROGRAM

## 2024-09-16 PROCEDURE — 1157F ADVNC CARE PLAN IN RCRD: CPT | Mod: CPTII,S$GLB,, | Performed by: STUDENT IN AN ORGANIZED HEALTH CARE EDUCATION/TRAINING PROGRAM

## 2024-09-16 PROCEDURE — 3078F DIAST BP <80 MM HG: CPT | Mod: CPTII,S$GLB,, | Performed by: STUDENT IN AN ORGANIZED HEALTH CARE EDUCATION/TRAINING PROGRAM

## 2024-09-16 RX ORDER — BENZONATATE 200 MG/1
200 CAPSULE ORAL 3 TIMES DAILY PRN
Qty: 60 CAPSULE | Refills: 1 | Status: SHIPPED | OUTPATIENT
Start: 2024-09-16 | End: 2024-09-26

## 2024-09-16 NOTE — PROGRESS NOTES
"    [facility]  [address]  [place] [address]    Subjective     Patient ID: Mina Valentine is a 84 y.o. male who comes to the clinic for an acute visit.    Chief Complaint: Follow-up    Patient tested positive for covid at the end of August, two weeks ago. Says he feels much better besides the lingering cough which is the reason why he came into clinic today    ROS negative unless stated above       Objective     Vitals:    09/16/24 1330   BP: 138/62   Pulse: 69   SpO2: 98%   Weight: 75.3 kg (166 lb)   Height: 5' 8" (1.727 m)       Wt Readings from Last 3 Encounters:   09/16/24 1330 75.3 kg (166 lb)   03/25/24 0935 76.6 kg (168 lb 14 oz)   03/19/24 0945 73 kg (161 lb)        Physical Exam  Vitals reviewed.   Constitutional:       Appearance: Normal appearance.   HENT:      Head: Normocephalic and atraumatic.   Eyes:      Extraocular Movements: Extraocular movements intact.      Pupils: Pupils are equal, round, and reactive to light.   Cardiovascular:      Rate and Rhythm: Normal rate.      Pulses: Normal pulses.      Heart sounds: Normal heart sounds.   Pulmonary:      Effort: Pulmonary effort is normal. No respiratory distress.      Breath sounds: Normal breath sounds.   Musculoskeletal:         General: Normal range of motion.      Cervical back: Normal range of motion and neck supple.   Skin:     General: Skin is dry.      Capillary Refill: Capillary refill takes less than 2 seconds.   Neurological:      General: No focal deficit present.      Mental Status: He is alert and oriented to person, place, and time. Mental status is at baseline.   Psychiatric:         Mood and Affect: Mood normal.         Behavior: Behavior normal.         Thought Content: Thought content normal.         Current Outpatient Medications   Medication Instructions    benzonatate (TESSALON) 200 mg, Oral, 3 times daily PRN    carvediloL (COREG) 3.125 MG tablet " TAKE 1 TABLET TWICE DAILY WITH MEALS    cloNIDine (CATAPRES) 0.1 mg, Oral, Every 4 hours PRN, Blood pressure over 180 on TOP (systolic)) or over 95 on BOTTOM (diastolic)    famotidine (PEPCID) 40 mg, Oral, 2 times daily, For acid reflux    famotidine (PEPCID) 40 mg, Oral, 2 times daily, For reflux    fexofenadine (ALLEGRA) 180 mg, Oral, Daily, For allergies    latanoprost 0.005 % ophthalmic solution No dose, route, or frequency recorded.    levothyroxine (SYNTHROID) 75 MCG tablet TAKE 1 TABLET EVERY DAY ( NEED  APPOINTMENT AND LAB )    loratadine (CLARITIN) 10 mg, Oral, Daily, For allergies. Can increase to twice daily if severe allergies.    NIFEdipine (PROCARDIA-XL) 60 mg, Oral, 2 times daily    omega 3-dha-epa-fish oil 120-180-500 mg Cap 1 capsule, Oral, Daily    pravastatin (PRAVACHOL) 40 MG tablet TAKE 1 TABLET EVERY DAY    valsartan (DIOVAN) 320 mg, Oral, Every morning           Assessment and Plan     1. Dry cough  -     benzonatate (TESSALON) 200 MG capsule; Take 1 capsule (200 mg total) by mouth 3 (three) times daily as needed for Cough.  Dispense: 60 capsule; Refill: 1        Here for an acute visit    For subacute cough, likely long covid symtpoms vs lingering cough from URI/covid infection. Will treat w/ tessalon zohaib but told him it could last a few weeks, he stated understanding    RTC w/ PCP    I encouraged the patient to take all medications as prescribed and to keep follow up appointments with their providers. ED precautions given more concerning issues. Questions were invited and answered. Patient stated they had no other concerns. Follow up sooner if needed.     Feng Rey MD  09/16/2024 1:36 PM

## 2024-09-26 ENCOUNTER — PATIENT MESSAGE (OUTPATIENT)
Dept: FAMILY MEDICINE | Facility: CLINIC | Age: 84
End: 2024-09-26
Payer: MEDICARE

## 2024-09-26 DIAGNOSIS — K21.9 GASTROESOPHAGEAL REFLUX DISEASE, UNSPECIFIED WHETHER ESOPHAGITIS PRESENT: ICD-10-CM

## 2024-09-26 DIAGNOSIS — R05.2 SUBACUTE COUGH: ICD-10-CM

## 2024-09-26 DIAGNOSIS — I10 PRIMARY HYPERTENSION: ICD-10-CM

## 2024-09-26 DIAGNOSIS — J30.1 NON-SEASONAL ALLERGIC RHINITIS DUE TO POLLEN: ICD-10-CM

## 2024-09-26 DIAGNOSIS — E03.9 HYPOTHYROIDISM, UNSPECIFIED TYPE: ICD-10-CM

## 2024-09-26 RX ORDER — VALSARTAN 320 MG/1
320 TABLET ORAL EVERY MORNING
Qty: 90 TABLET | Refills: 1 | Status: SHIPPED | OUTPATIENT
Start: 2024-09-26

## 2024-09-26 RX ORDER — FAMOTIDINE 40 MG/1
40 TABLET, FILM COATED ORAL 2 TIMES DAILY
Qty: 180 TABLET | Refills: 1 | Status: SHIPPED | OUTPATIENT
Start: 2024-09-26 | End: 2025-09-26

## 2024-09-26 RX ORDER — PRAVASTATIN SODIUM 40 MG/1
40 TABLET ORAL DAILY
Qty: 90 TABLET | Refills: 1 | Status: SHIPPED | OUTPATIENT
Start: 2024-09-26

## 2024-09-26 RX ORDER — NIFEDIPINE 60 MG/1
60 TABLET, EXTENDED RELEASE ORAL 2 TIMES DAILY
Qty: 180 TABLET | Refills: 3 | OUTPATIENT
Start: 2024-09-26

## 2024-09-26 RX ORDER — MINERAL OIL
180 ENEMA (ML) RECTAL DAILY
Qty: 90 TABLET | Refills: 1 | Status: SHIPPED | OUTPATIENT
Start: 2024-09-26 | End: 2025-09-26

## 2024-09-26 RX ORDER — LORATADINE 10 MG/1
10 TABLET ORAL DAILY PRN
Qty: 90 TABLET | Refills: 1 | Status: SHIPPED | OUTPATIENT
Start: 2024-09-26 | End: 2025-09-26

## 2024-09-26 RX ORDER — LEVOTHYROXINE SODIUM 75 UG/1
TABLET ORAL
Qty: 90 TABLET | Refills: 1 | Status: SHIPPED | OUTPATIENT
Start: 2024-09-26

## 2024-09-26 RX ORDER — NIFEDIPINE 60 MG/1
60 TABLET, EXTENDED RELEASE ORAL 2 TIMES DAILY
Qty: 180 TABLET | Refills: 1 | Status: SHIPPED | OUTPATIENT
Start: 2024-09-26

## 2024-09-26 RX ORDER — CARVEDILOL 3.12 MG/1
3.12 TABLET ORAL 2 TIMES DAILY WITH MEALS
Qty: 180 TABLET | Refills: 1 | Status: SHIPPED | OUTPATIENT
Start: 2024-09-26

## 2024-09-26 RX ORDER — CARVEDILOL 3.12 MG/1
3.12 TABLET ORAL 2 TIMES DAILY WITH MEALS
Qty: 180 TABLET | Refills: 3 | OUTPATIENT
Start: 2024-09-26

## 2024-09-26 RX ORDER — PRAVASTATIN SODIUM 40 MG/1
40 TABLET ORAL DAILY
Qty: 90 TABLET | Refills: 3 | OUTPATIENT
Start: 2024-09-26

## 2024-09-26 RX ORDER — CLONIDINE HYDROCHLORIDE 0.1 MG/1
0.1 TABLET ORAL EVERY 4 HOURS PRN
Qty: 90 TABLET | Refills: 1 | Status: SHIPPED | OUTPATIENT
Start: 2024-09-26

## 2024-09-26 NOTE — TELEPHONE ENCOUNTER
Refill Routing Note   Medication(s) are not appropriate for processing by [facility] for the following reason(s):        Required labs outdated: lipid panel; scheduled for 11/14/24    OR action(s):  Defer  Approve               Appointments  past 12m or future 3m with PCP    Date Provider   Last Visit   Visit date not found Blanca Hatch MD   Next Visit   9/26/2024 Blanca Hatch MD   ED visits in past 90 days: 0        Note composed:4:16 PM 09/26/2024

## 2024-09-26 NOTE — TELEPHONE ENCOUNTER
Last office visit: 9/16/2024  ----- Message from Joanne Fernandez sent at 9/26/2024  3:39 PM CDT -----  Contact: pt  Type:  RX Refill Request    Who Called: pt    Refill or New Rx: refill    RX Name and Strength: 3 medications    NIFEdipine (PROCARDIA-XL) 60 MG (OSM) 24 hr tablet  pravastatin (PRAVACHOL) 40 MG tablet  carvediloL (COREG) 3.125 MG tablet    How is the patient currently taking it? (ex. 1XDay):as directed    Is this a 30 day or 90 day RX: 90    Preferred Pharmacy with phone number:     [facility] - [place], [place] - [address]  [address]  [place] [address]  Phone: [phone] Fax: [phone]    Local or Mail Order: local    Ordering Provider: Holden    Would the patient rather a call back or a response via [facility]?  Call after sending    Best Call Back Number:  [phone]    Additional Information:  pt is needing new scripts sent to LOCAL pharmacy please     Please refill    Thanks

## 2024-09-26 NOTE — TELEPHONE ENCOUNTER
No care due was identified.  [facility] Embedded Care Due Messages. Reference number: [id number].   9/26/2024 3:24:57 PM CDT

## 2024-09-26 NOTE — TELEPHONE ENCOUNTER
No care due was identified.  [facility] Embedded Care Due Messages. Reference number: [id number].   9/26/2024 3:59:01 PM CDT

## 2024-09-26 NOTE — TELEPHONE ENCOUNTER
Refill Decision Note   Mina Valentine  is requesting a refill authorization.  Brief Assessment and Rationale for Refill:  Quick Discontinue     Medication Therapy Plan:  QDC Coreg & Procardia - Receipt confirmed by pharmacy (9/26/2024 4:16 PM CDT); Pravachol - Script pending provider's review in separate request.      Comments:     Note composed:4:17 PM 09/26/2024

## 2024-09-26 NOTE — TELEPHONE ENCOUNTER
No care due was identified.  [facility] Embedded Care Due Messages. Reference number: [id number].   9/26/2024 11:45:23 AM CDT

## 2024-09-26 NOTE — TELEPHONE ENCOUNTER
Cardiovascular:  Antilipid - Statins Yeieco1509/26/2024 11:44 AM   Protocol Details Matches previous order    Total Cholesterol within 360 days    LDL within 360 days    HDL within 360 days    Triglycerides within 360 days

## 2024-09-27 ENCOUNTER — PATIENT MESSAGE (OUTPATIENT)
Dept: ADMINISTRATIVE | Facility: OTHER | Age: 84
End: 2024-09-27
Payer: MEDICARE

## 2024-09-28 ENCOUNTER — NURSE TRIAGE (OUTPATIENT)
Dept: ADMINISTRATIVE | Facility: CLINIC | Age: 84
End: 2024-09-28
Payer: MEDICARE

## 2024-09-28 NOTE — TELEPHONE ENCOUNTER
OOC NT return call -  Pt reports C19+ on 8/28/24 feels ok but is still having a cough. Cough is worse at night - Covid protocol followed and pt advised  to speak with pcp when office opens. Pt decline Nt scheduling an apt at this time.  On demand VV offered and declined. Encounter routed to PCP   Reason for Disposition   Cough present > 3 weeks    Additional Information   Negative: SEVERE difficulty breathing (e.g., struggling for each breath, speaks in single words)   Negative: Difficult to awaken or acting confused (e.g., disoriented, slurred speech)   Negative: Bluish (or gray) lips or face now   Negative: Shock suspected (e.g., cold/pale/clammy skin, too weak to stand, low BP, rapid pulse)   Negative: Sounds like a life-threatening emergency to the triager   Negative: SEVERE or constant chest pain or pressure  (Exception: Mild central chest pain, present only when coughing.)   Negative: MODERATE difficulty breathing (e.g., speaks in phrases, SOB even at rest, pulse 100-120)   Negative: [1] Headache AND [2] stiff neck (can't touch chin to chest)   Negative: Oxygen level (e.g., pulse oximetry) 90 percent or lower   Negative: Chest pain or pressure  (Exception: MILD central chest pain, present only when coughing.)   Negative: [1] Drinking very little AND [2] dehydration suspected (e.g., no urine > 12 hours, very dry mouth, very lightheaded)   Negative: Patient sounds very sick or weak to the triager   Negative: MILD difficulty breathing (e.g., minimal/no SOB at rest, SOB with walking, pulse <100)   Negative: Fever > 103 F (39.4 C)   Negative: [1] Fever > 101 F (38.3 C) AND [2] age > 60 years   Negative: [1] Fever > 100.0 F (37.8 C) AND [2] bedridden (e.g., CVA, chronic illness, recovering from surgery)   Negative: Oxygen level (e.g., pulse oximetry) 91 to 94 percent   Negative: [1] HIGH RISK patient (e.g., weak immune system, age > 64 years, obesity with BMI 30 or higher, pregnant, chronic lung disease or other  chronic medical condition) AND [2] COVID symptoms (e.g., cough, fever)  (Exceptions: Already seen by PCP and no new or worsening symptoms.)   Negative: [1] HIGH RISK patient AND [2] influenza is widespread in the community AND [3] ONE OR MORE respiratory symptoms: cough, sore throat, runny or stuffy nose   Negative: [1] HIGH RISK patient AND [2] influenza exposure within the last 7 days AND [3] ONE OR MORE respiratory symptoms: cough, sore throat, runny or stuffy nose   Negative: Fever present > 3 days (72 hours)   Negative: [1] Fever returns after gone for over 24 hours AND [2] symptoms worse or not improved   Negative: [1] Continuous (nonstop) coughing interferes with work or school AND [2] no improvement using cough treatment per Care Advice   Negative: [1] COVID-19 infection suspected by caller or triager AND [2] mild symptoms (cough, fever, or others) AND [3] negative COVID-19 rapid test    Protocols used: Coronavirus (COVID-19) Diagnosed or [id number]

## 2024-09-30 NOTE — TELEPHONE ENCOUNTER
Patient was diagnosis with COVID about month and is still coughing a lot especially at night, on call nurse suggested teaspoon of honey at night and used vicks vapor rubs. Patient states these have helped suppressed his cough and doing but better. I informed him if anything changes to let us know. Patient verbalized understanding.

## 2024-11-12 ENCOUNTER — LAB VISIT (OUTPATIENT)
Dept: LAB | Facility: HOSPITAL | Age: 84
End: 2024-11-12
Payer: MEDICARE

## 2024-11-12 DIAGNOSIS — N18.32 STAGE 3B CHRONIC KIDNEY DISEASE: ICD-10-CM

## 2024-11-12 DIAGNOSIS — E03.9 ACQUIRED HYPOTHYROIDISM: ICD-10-CM

## 2024-11-12 DIAGNOSIS — Z12.5 ENCOUNTER FOR SCREENING FOR MALIGNANT NEOPLASM OF PROSTATE: ICD-10-CM

## 2024-11-12 DIAGNOSIS — Z85.46 HISTORY OF PROSTATE CANCER: ICD-10-CM

## 2024-11-12 DIAGNOSIS — R73.9 HYPERGLYCEMIA: ICD-10-CM

## 2024-11-12 DIAGNOSIS — Z00.00 ANNUAL PHYSICAL EXAM: ICD-10-CM

## 2024-11-12 DIAGNOSIS — E78.2 MIXED HYPERLIPIDEMIA: ICD-10-CM

## 2024-11-12 DIAGNOSIS — I10 PRIMARY HYPERTENSION: ICD-10-CM

## 2024-11-12 LAB
ALBUMIN SERPL BCP-MCNC: 3.8 G/DL (ref 3.5–5.2)
ALBUMIN SERPL BCP-MCNC: 3.8 G/DL (ref 3.5–5.2)
ALBUMIN/CREAT UR: 6 UG/MG (ref 0–30)
ALP SERPL-CCNC: 92 U/L (ref 40–150)
ALT SERPL W/O P-5'-P-CCNC: 32 U/L (ref 10–44)
ANION GAP SERPL CALC-SCNC: 9 MMOL/L (ref 8–16)
ANION GAP SERPL CALC-SCNC: 9 MMOL/L (ref 8–16)
AST SERPL-CCNC: 25 U/L (ref 10–40)
BASOPHILS # BLD AUTO: 0.06 K/UL (ref 0–0.2)
BASOPHILS NFR BLD: 0.5 % (ref 0–1.9)
BILIRUB SERPL-MCNC: 0.8 MG/DL (ref 0.1–1)
BUN SERPL-MCNC: 25 MG/DL (ref 8–23)
BUN SERPL-MCNC: 25 MG/DL (ref 8–23)
CALCIUM SERPL-MCNC: 9.6 MG/DL (ref 8.7–10.5)
CALCIUM SERPL-MCNC: 9.6 MG/DL (ref 8.7–10.5)
CHLORIDE SERPL-SCNC: 107 MMOL/L (ref 95–110)
CHLORIDE SERPL-SCNC: 107 MMOL/L (ref 95–110)
CHOLEST SERPL-MCNC: 247 MG/DL (ref 120–199)
CHOLEST/HDLC SERPL: 5.3 {RATIO} (ref 2–5)
CO2 SERPL-SCNC: 22 MMOL/L (ref 23–29)
CO2 SERPL-SCNC: 22 MMOL/L (ref 23–29)
COMPLEXED PSA SERPL-MCNC: <0.01 NG/ML (ref 0–4)
CREAT SERPL-MCNC: 1.6 MG/DL (ref 0.5–1.4)
CREAT SERPL-MCNC: 1.6 MG/DL (ref 0.5–1.4)
CREAT UR-MCNC: 250 MG/DL (ref 23–375)
DIFFERENTIAL METHOD BLD: ABNORMAL
EOSINOPHIL # BLD AUTO: 0.3 K/UL (ref 0–0.5)
EOSINOPHIL NFR BLD: 2.3 % (ref 0–8)
ERYTHROCYTE [DISTWIDTH] IN BLOOD BY AUTOMATED COUNT: 12.3 % (ref 11.5–14.5)
EST. GFR  (NO RACE VARIABLE): 42.5 ML/MIN/1.73 M^2
EST. GFR  (NO RACE VARIABLE): 42.5 ML/MIN/1.73 M^2
ESTIMATED AVG GLUCOSE: 105 MG/DL (ref 68–131)
GLUCOSE SERPL-MCNC: 98 MG/DL (ref 70–110)
GLUCOSE SERPL-MCNC: 98 MG/DL (ref 70–110)
HBA1C MFR BLD: 5.3 % (ref 4–5.6)
HCT VFR BLD AUTO: 41.1 % (ref 40–54)
HDLC SERPL-MCNC: 47 MG/DL (ref 40–75)
HDLC SERPL: 19 % (ref 20–50)
HGB BLD-MCNC: 13.9 G/DL (ref 14–18)
IMM GRANULOCYTES # BLD AUTO: 0.03 K/UL (ref 0–0.04)
IMM GRANULOCYTES NFR BLD AUTO: 0.3 % (ref 0–0.5)
IRON SERPL-MCNC: 98 UG/DL (ref 45–160)
LDLC SERPL CALC-MCNC: 157.2 MG/DL (ref 63–159)
LYMPHOCYTES # BLD AUTO: 3.2 K/UL (ref 1–4.8)
LYMPHOCYTES NFR BLD: 29.4 % (ref 18–48)
MAGNESIUM SERPL-MCNC: 2.2 MG/DL (ref 1.6–2.6)
MCH RBC QN AUTO: 31.3 PG (ref 27–31)
MCHC RBC AUTO-ENTMCNC: 33.8 G/DL (ref 32–36)
MCV RBC AUTO: 93 FL (ref 82–98)
MICROALBUMIN UR DL<=1MG/L-MCNC: 15 UG/ML
MONOCYTES # BLD AUTO: 1.1 K/UL (ref 0.3–1)
MONOCYTES NFR BLD: 10.4 % (ref 4–15)
NEUTROPHILS # BLD AUTO: 6.3 K/UL (ref 1.8–7.7)
NEUTROPHILS NFR BLD: 57.1 % (ref 38–73)
NONHDLC SERPL-MCNC: 200 MG/DL
NRBC BLD-RTO: 0 /100 WBC
PHOSPHATE SERPL-MCNC: 2.7 MG/DL (ref 2.7–4.5)
PHOSPHATE SERPL-MCNC: 2.7 MG/DL (ref 2.7–4.5)
PLATELET # BLD AUTO: 263 K/UL (ref 150–450)
PMV BLD AUTO: 9.9 FL (ref 9.2–12.9)
POTASSIUM SERPL-SCNC: 4.3 MMOL/L (ref 3.5–5.1)
POTASSIUM SERPL-SCNC: 4.3 MMOL/L (ref 3.5–5.1)
PROT SERPL-MCNC: 8.1 G/DL (ref 6–8.4)
RBC # BLD AUTO: 4.44 M/UL (ref 4.6–6.2)
SATURATED IRON: 23 % (ref 20–50)
SODIUM SERPL-SCNC: 138 MMOL/L (ref 136–145)
SODIUM SERPL-SCNC: 138 MMOL/L (ref 136–145)
T4 FREE SERPL-MCNC: 1.24 NG/DL (ref 0.71–1.51)
TOTAL IRON BINDING CAPACITY: 423 UG/DL (ref 250–450)
TRANSFERRIN SERPL-MCNC: 286 MG/DL (ref 200–375)
TRIGL SERPL-MCNC: 214 MG/DL (ref 30–150)
TSH SERPL DL<=0.005 MIU/L-ACNC: 2.49 UIU/ML (ref 0.4–4)
WBC # BLD AUTO: 11.01 K/UL (ref 3.9–12.7)

## 2024-11-12 PROCEDURE — 84100 ASSAY OF PHOSPHORUS: CPT | Performed by: FAMILY MEDICINE

## 2024-11-12 PROCEDURE — 84153 ASSAY OF PSA TOTAL: CPT | Performed by: FAMILY MEDICINE

## 2024-11-12 PROCEDURE — 80061 LIPID PANEL: CPT | Performed by: FAMILY MEDICINE

## 2024-11-12 PROCEDURE — 84443 ASSAY THYROID STIM HORMONE: CPT | Performed by: FAMILY MEDICINE

## 2024-11-12 PROCEDURE — 85025 COMPLETE CBC W/AUTO DIFF WBC: CPT | Performed by: FAMILY MEDICINE

## 2024-11-12 PROCEDURE — 83540 ASSAY OF IRON: CPT | Performed by: FAMILY MEDICINE

## 2024-11-12 PROCEDURE — 83036 HEMOGLOBIN GLYCOSYLATED A1C: CPT | Performed by: FAMILY MEDICINE

## 2024-11-12 PROCEDURE — 82570 ASSAY OF URINE CREATININE: CPT | Performed by: FAMILY MEDICINE

## 2024-11-12 PROCEDURE — 83735 ASSAY OF MAGNESIUM: CPT | Performed by: FAMILY MEDICINE

## 2024-11-12 PROCEDURE — 84439 ASSAY OF FREE THYROXINE: CPT | Performed by: FAMILY MEDICINE

## 2024-11-12 PROCEDURE — 80053 COMPREHEN METABOLIC PANEL: CPT | Performed by: FAMILY MEDICINE

## 2024-11-14 NOTE — PROGRESS NOTES
[facility] Note    PCP: Blanca Hatch MD    Chief Complaint: gross hematuria     SUBJECTIVE:       History of Present Illness:  Mina Valentine is a 84 y.o. male who presents to clinic for hematuria. He is Established  to our clinic.     Doing very well with no complaints.   No further episodes of gross hematuria.   Voiding without difficulty.   He did note that he passed the brachy seed.     Cysto 3/19/24:   - small brachy seed lodged in anterior prostatic tissue, pushed into the bladder  - due to small size this was unable to be retrieved however patient should be able to void this out  - no other concerning bladder findings, no tumors   - bilobar prostatic hypertrophy present     PSA 11/12/24: <0.01      3/4/24  2 weeks ago had an episode of hematuria and passage of some tissue.     No bothersome urinary issues.     Renal US 2/20/24: no abnormal findings     Hx of prostate cancer s/p brachytherapy in 2006. No other therapy needed.     Former smoker, quit 40 years ago, 2-3 packs per day.   Former  at [employer] store.     UA today: trace blood, otherwise negative   PVR today: 22 cc    Last urine culture: no growth (2/21/24)    Lab Results   Component Value Date    CREATININE 1.6 (H) 11/12/2024    CREATININE 1.6 (H) 11/12/2024     PSA, Screen (ng/mL)   Date Value   02/21/2024 <0.01     PSA Total (ng/mL)   Date Value   02/20/2017 0.02     PSA, Free (ng/mL)   Date Value   02/20/2017 <0.01     PSA, Free % (%)   Date Value   02/20/2017 Unable to calculate     Family  hx: no malignancy   Hx of HTN, HLD, CKD    Past medical, family, and social history reviewed as documented in chart with pertinent positive medical, family, and social history detailed in HPI.    Review of patient's allergies indicates:   Allergen Reactions    Penicillins Rash     Other reaction(s): Flushing (skin)       Past Medical History:   Diagnosis Date    Basal cell carcinoma of face 2014, mohs    left medial  "cheek, right upper cheek    Deviated septum     History of glaucoma     History of prostate cancer 02/10/2016    History of umbilical hernia repair     HTN (hypertension)     Hyperlipidemia     Hypothyroidism     hypothyroidism    Ocular hypertension 10/18/2016    Prostate cancer     seed implants 2006     Past Surgical History:   Procedure Laterality Date    brachytherapy for prostate cancer      [place], [place]    CARDIOVASCULAR STRESS TEST       estimated    COLONOSCOPY      no polyps approx. 2009    CYSTOSCOPY N/A 3/19/2024    Procedure: CYSTOSCOPY;  Surgeon: Mary Kate Fish MD;  Location: [facility] OR;  Service: Urology;  Laterality: N/A;    EYE SURGERY Bilateral 2018    cataract surgery    hydrocele  1960    INGUINAL HERNIA REPAIR Right     PROSTATE SURGERY  2007    seed implant    UMBILICAL HERNIA REPAIR  2004    belly button; had previous repair that failed, unsure how long ago     Family History   Problem Relation Name Age of Onset    Dementia Mother      Coronary artery disease Father joshua 58         MI    Diabetes Father joshua         type 1    Heart disease Father joshua     Stroke Sister emilie     Heart disease Sister emilie         a fib    No Known Problems Sister      Cancer Brother devika yee 89        brain cancer    No Known Problems Daughter      No Known Problems Daughter      No Known Problems Son       Social History     Tobacco Use    Smoking status: Former     Current packs/day: 0.00     Average packs/day: 1 pack/day for 30.0 years (30.0 ttl pk-yrs)     Types: Cigarettes, Cigars     Start date: 1955     Quit date: 1985     Years since quittin.8    Smokeless tobacco: Never   Substance Use Topics    Alcohol use: No    Drug use: No        Review of Systems    OBJECTIVE:     Anticoagulation:  no    Estimated body mass index is 25.85 kg/m² as calculated from the following:    Height as of 11/15/24: 5' 8" (1.727 m).    Weight as of 11/15/24: 77.1 kg (170 " lb).    Vital Signs (Most Recent)       Physical Exam  Constitutional:       General: He is not in acute distress.     Appearance: Normal appearance. He is not ill-appearing.   HENT:      Head: Normocephalic and atraumatic.   Eyes:      General: No scleral icterus.  Pulmonary:      Effort: Pulmonary effort is normal. No respiratory distress.   Skin:     Coloration: Skin is not jaundiced.   Neurological:      General: No focal deficit present.      Mental Status: He is alert and oriented to person, place, and time.   Psychiatric:         Mood and Affect: Mood normal.         Behavior: Behavior normal.         Thought Content: Thought content normal.         BMP  Lab Results   Component Value Date     11/12/2024     11/12/2024    K 4.3 11/12/2024    K 4.3 11/12/2024     11/12/2024     11/12/2024    CO2 22 (L) 11/12/2024    CO2 22 (L) 11/12/2024    BUN 25 (H) 11/12/2024    BUN 25 (H) 11/12/2024    CREATININE 1.6 (H) 11/12/2024    CREATININE 1.6 (H) 11/12/2024    CALCIUM 9.6 11/12/2024    CALCIUM 9.6 11/12/2024    ANIONGAP 9 11/12/2024    ANIONGAP 9 11/12/2024    ESTGFRAFRICA 49.8 (A) 06/06/2022    EGFRNONAA 43.0 (A) 06/06/2022       Lab Results   Component Value Date    WBC 11.01 11/12/2024    HGB 13.9 (L) 11/12/2024    HCT 41.1 11/12/2024    MCV 93 11/12/2024     11/12/2024       Imaging:  Per HPI    ASSESSMENT     1. History of prostate cancer    2. Gross hematuria      PLAN:     - Doing very well now  - Will send urine for micro and culture   - Recent PSA is undetectable   - Follow up with me in 1 year with repeat PSA or sooner if needed     Mary Kate Fish MD       Visit today included increased complexity associated with the care of the episodic problem prostate cancer and gross hematuria addressed and managing the longitudinal care of the patient due to the serious and/or complex managed problem(s).     I spent 30 minutes with the patient. Over 50% of the visit was spent in  counseling.

## 2024-11-15 ENCOUNTER — OFFICE VISIT (OUTPATIENT)
Dept: FAMILY MEDICINE | Facility: CLINIC | Age: 84
End: 2024-11-15
Payer: MEDICARE

## 2024-11-15 VITALS
WEIGHT: 170 LBS | HEART RATE: 71 BPM | DIASTOLIC BLOOD PRESSURE: 60 MMHG | OXYGEN SATURATION: 98 % | BODY MASS INDEX: 25.76 KG/M2 | HEIGHT: 68 IN | SYSTOLIC BLOOD PRESSURE: 128 MMHG

## 2024-11-15 DIAGNOSIS — E78.2 MIXED HYPERLIPIDEMIA: ICD-10-CM

## 2024-11-15 DIAGNOSIS — N18.32 STAGE 3B CHRONIC KIDNEY DISEASE: ICD-10-CM

## 2024-11-15 DIAGNOSIS — I10 PRIMARY HYPERTENSION: Primary | ICD-10-CM

## 2024-11-15 DIAGNOSIS — E03.9 ACQUIRED HYPOTHYROIDISM: ICD-10-CM

## 2024-11-15 DIAGNOSIS — Z85.46 HISTORY OF PROSTATE CANCER: ICD-10-CM

## 2024-11-15 PROCEDURE — 99999 PR PBB SHADOW E&M-EST. PATIENT-LVL III: CPT | Mod: PBBFAC,,, | Performed by: FAMILY MEDICINE

## 2024-11-15 NOTE — PROGRESS NOTES
Subjective:       Patient ID: Mina Valentine is a 84 y.o. male.    Chief Complaint: Annual Exam (Pt is here for his annual check up)    HPI    History of Present Illness    CHIEF COMPLAINT:  Patient presents for a follow-up visit to discuss recent lab results and medication management for hypertension and other chronic conditions.    HPI:      HTN--Patient reports well-controlled blood pressure, with recent readings of 120/60. He denies weakness or fatigue at this blood pressure level. Patient has occasional transient dizziness with rapid position changes. He has not been using clonidine for blood pressure control.    COVID--Patient recently recovered from COVID-19 at the end of August, contracted after returning from a week-long trip to [facility] and [place]. He attributes some changes in his lab values, particularly iron levels, to the recent COVID-19 infection.    BCC--Patient has basal cell carcinoma from sun exposure. A specialist confirmed a basal cell carcinoma near his eye. Patient is scheduled for Mohs surgery and reconstruction with an ophthalmologist, Dr. Lopez. He is concerned about the carcinoma's proximity to his eye.    NOSEBLEEDS--Patient reports occasional right-sided nosebleeds, attributed to weather changes and dryness. He manages this effectively with saline nasal spray.    MEDICATIONS:  Patient is on Carvedilol 3.125 mg twice daily, Valsartan 320 mg every morning, and Nifedipine 60 mg twice daily. He takes Pravastatin 40 mg daily for cholesterol management. Patient is also on Levothyroxine (Synthroid) and uses Allegra (fexofenadine) as needed for allergies.    MEDICAL HISTORY:  Patient has a history of high blood pressure, basal cell carcinoma, chronic kidney disease, and hypothyroidism. He contracted COVID-19 in August 2023. Patient received a flu vaccine 2 weeks ago.    SURGICAL HISTORY:  Patient has undergone radiation seed implants for prostate cancer treatment. He has an  upcoming Mohs surgery scheduled for basal cell carcinoma near the L eye.    TEST RESULTS:  Patient's most recent CBC showed a hemoglobin level of 13.9 g/dL, which is within the normal range. His mean corpuscular hemoglobin was normal, and monocytes were slightly elevated but considered normal. The latest Renal Panel revealed a creatinine level of 1.6 mg/dL, which is stable compared to 1.9 mg/dL in February. His BUN was 23 mg/dL, with normal potassium and sodium levels. The most recent microalbumin-to-creatinine ratio was described as optimal. His PSA was undetectable in the latest test. The Thyroid Panel showed a TSH between 1.5 and 2.5, with normal free T4. Magnesium levels were normal. The most recent Iron Panel indicated an increased Total Iron Binding Capacity (TIBC), but still within normal range, and iron saturation at 23%, down from 27% two years ago.    SOCIAL HISTORY:  Patient reports alcohol consumption.    ROS:  General: -fever, -chills, -fatigue, -weight gain, -weight loss  Eyes: -vision changes, -redness, -discharge  ENT: -ear pain, -nasal congestion, -sore throat, +nosebleeds  Cardiovascular: -chest pain, -palpitations, -lower extremity edema  Respiratory: -cough, -shortness of breath  Gastrointestinal: -abdominal pain, -nausea, -vomiting, -diarrhea, -constipation, -blood in stool  Genitourinary: -dysuria, -hematuria, -frequency  Musculoskeletal: -joint pain, -muscle pain  Skin: -rash, -lesion  Neurological: -headache, +dizziness, -numbness, -tingling, -weakness  Psychiatric: -anxiety, -depression, -sleep difficulty         Review of Systems      Reviewed family, medical, surgical, and social history.    Objective:      Physical Exam    Vitals: Blood pressure: 120/60.  General: No acute distress. Well-developed. Well-nourished.  Eyes: EOMI. Sclerae anicteric.  HENT: Normocephalic. Atraumatic. Nares patent. No visible polyp or enlarged vessel, no blood in naris. Moist oral mucosa. Clear drainage in  "throat.  Ears: External ears wnl, hearing aids present.  Cardiovascular: Regular rate. Regular rhythm. No murmurs.   Respiratory: Normal respiratory effort. Clear to auscultation bilaterally. No rales. No rhonchi. No wheezing.  Abdomen: Soft. Non-distended.   Musculoskeletal: No  obvious deformity.  Extremities: No lower extremity edema.  Neurological: Alert & oriented x3. No slurred speech. Normal gait.  Psychiatric: Normal mood. Normal affect. Good insight. Good judgment.  Skin: Warm. Dry. No rash.  Neck: Slightly puffy anterior cervical glands, nontender.       /60 (BP Location: Right arm, Patient Position: Sitting)   Pulse 71   Ht 5' 8" (1.727 m)   Wt 77.1 kg (170 lb)   SpO2 98%   BMI 25.85 kg/m²   Physical Exam    Assessment:       1. Primary hypertension    2. Acquired hypothyroidism    3. Mixed hyperlipidemia    4. History of prostate cancer    5. Stage 3b chronic kidney disease        Plan:       Assessment & Plan    Assessed blood pressure control; current BP of 120/60 deemed optimal  Evaluated thyroid function via TSH and free T4 levels; results indicate appropriate levothyroxine dosage  Reviewed recent lab results, including CBC, renal panel, and iron studies  Noted slight elevation in iron binding capacity and decrease in iron saturation, possibly due to recent COVID infection  Considered patient's history of basal cell carcinoma and upcoming Mohs surgery    HYPERLIPIDEMIA:  Explained the role of CoQ10 as an antioxidant that fights free radicals and its potential benefits for patients on statin medications.  Started CoQ10 supplement.  Continued pravastatin 40 mg daily.    HYPERTENSION:  Continued carvedilol 3.125 mg twice daily, valsartan 320 mg every morning, nifedipine 60 mg twice daily.    COVID-19 HISTORY:  Discussed the impact of COVID-19 on iron stores and overall health.  Consider getting COVID-19 booster shot, as it has been more than 3 months since last infection.    ANEMIA " MANAGEMENT:  Patient to increase consumption of iron-rich foods to address slight decrease in iron saturation.    FOLLOW-UP:  Follow up in 3 months.  Contact the office via patient portal if feeling unwell or needing to be seen.         1. Primary hypertension  Stable. Continue current treatment. Monitor labs as warranted.    2. Acquired hypothyroidism  Stable. Continue current treatment. Monitor labs as warranted.  Overview:  hypothyroidism    3. Mixed hyperlipidemia  Increased, patient suspects due to recent travel as well as COVID illness the month before lipid panel was collected.  Continue pravastatin 40mg nightly.  Repeat in 6 months.    4. History of prostate cancer  PSA <0.01.    5. Stage 3b chronic kidney disease  Stable. Continue current treatment. Monitor labs as warranted.  Normal BP and glucose.              Strict return precautions reviewed and patient verbalized understanding. Risks, benefits, and alternatives to the plan were reviewed in detail and all questions answered to the patient's satisfaction. All questions were answered to the fullest satisfaction of the patient, and patient verbalized understanding and agreement to treatment plan. Patient agreed to call with any new or worsening symptoms, or present to the ER.     40 minutes total were spent on today's visit, not limited to but including time based on counseling and coordination of care.    This note was generated with the assistance of ambient listening technology. Verbal consent was obtained by the patient and accompanying visitor(s) for the recording of patient appointment to facilitate this note. I attest to having reviewed and edited the generated note for accuracy, though some syntax or spelling errors may persist. Please contact the author of this note for any clarification.

## 2024-11-19 ENCOUNTER — OFFICE VISIT (OUTPATIENT)
Dept: UROLOGY | Facility: CLINIC | Age: 84
End: 2024-11-19
Payer: MEDICARE

## 2024-11-19 DIAGNOSIS — Z85.46 HISTORY OF PROSTATE CANCER: Primary | ICD-10-CM

## 2024-11-19 DIAGNOSIS — R31.0 GROSS HEMATURIA: ICD-10-CM

## 2024-11-19 LAB
MICROSCOPIC COMMENT: NORMAL
RBC #/AREA URNS HPF: 0 /HPF (ref 0–4)

## 2024-11-19 PROCEDURE — 1159F MED LIST DOCD IN RCRD: CPT | Mod: CPTII,S$GLB,, | Performed by: STUDENT IN AN ORGANIZED HEALTH CARE EDUCATION/TRAINING PROGRAM

## 2024-11-19 PROCEDURE — 81000 URINALYSIS NONAUTO W/SCOPE: CPT | Performed by: STUDENT IN AN ORGANIZED HEALTH CARE EDUCATION/TRAINING PROGRAM

## 2024-11-19 PROCEDURE — 1101F PT FALLS ASSESS-DOCD LE1/YR: CPT | Mod: CPTII,S$GLB,, | Performed by: STUDENT IN AN ORGANIZED HEALTH CARE EDUCATION/TRAINING PROGRAM

## 2024-11-19 PROCEDURE — 1126F AMNT PAIN NOTED NONE PRSNT: CPT | Mod: CPTII,S$GLB,, | Performed by: STUDENT IN AN ORGANIZED HEALTH CARE EDUCATION/TRAINING PROGRAM

## 2024-11-19 PROCEDURE — 87086 URINE CULTURE/COLONY COUNT: CPT | Performed by: STUDENT IN AN ORGANIZED HEALTH CARE EDUCATION/TRAINING PROGRAM

## 2024-11-19 PROCEDURE — G2211 COMPLEX E/M VISIT ADD ON: HCPCS | Mod: S$GLB,,, | Performed by: STUDENT IN AN ORGANIZED HEALTH CARE EDUCATION/TRAINING PROGRAM

## 2024-11-19 PROCEDURE — 99999 PR PBB SHADOW E&M-EST. PATIENT-LVL I: CPT | Mod: PBBFAC,,, | Performed by: STUDENT IN AN ORGANIZED HEALTH CARE EDUCATION/TRAINING PROGRAM

## 2024-11-19 PROCEDURE — 1160F RVW MEDS BY RX/DR IN RCRD: CPT | Mod: CPTII,S$GLB,, | Performed by: STUDENT IN AN ORGANIZED HEALTH CARE EDUCATION/TRAINING PROGRAM

## 2024-11-19 PROCEDURE — 1157F ADVNC CARE PLAN IN RCRD: CPT | Mod: CPTII,S$GLB,, | Performed by: STUDENT IN AN ORGANIZED HEALTH CARE EDUCATION/TRAINING PROGRAM

## 2024-11-19 PROCEDURE — 3288F FALL RISK ASSESSMENT DOCD: CPT | Mod: CPTII,S$GLB,, | Performed by: STUDENT IN AN ORGANIZED HEALTH CARE EDUCATION/TRAINING PROGRAM

## 2024-11-19 PROCEDURE — 99214 OFFICE O/P EST MOD 30 MIN: CPT | Mod: S$GLB,,, | Performed by: STUDENT IN AN ORGANIZED HEALTH CARE EDUCATION/TRAINING PROGRAM

## 2024-11-21 LAB — BACTERIA UR CULT: NORMAL

## 2024-12-08 ENCOUNTER — PATIENT MESSAGE (OUTPATIENT)
Dept: OTHER | Facility: OTHER | Age: 84
End: 2024-12-08
Payer: MEDICARE

## 2025-01-06 ENCOUNTER — PATIENT MESSAGE (OUTPATIENT)
Dept: FAMILY MEDICINE | Facility: CLINIC | Age: 85
End: 2025-01-06
Payer: MEDICARE

## 2025-01-28 ENCOUNTER — PATIENT MESSAGE (OUTPATIENT)
Dept: ADMINISTRATIVE | Facility: OTHER | Age: 85
End: 2025-01-28
Payer: MEDICARE

## 2025-02-14 ENCOUNTER — PATIENT MESSAGE (OUTPATIENT)
Dept: ADMINISTRATIVE | Facility: OTHER | Age: 85
End: 2025-02-14
Payer: MEDICARE

## 2025-02-24 ENCOUNTER — PATIENT MESSAGE (OUTPATIENT)
Dept: FAMILY MEDICINE | Facility: CLINIC | Age: 85
End: 2025-02-24
Payer: MEDICARE

## 2025-02-25 ENCOUNTER — LAB VISIT (OUTPATIENT)
Dept: LAB | Facility: CLINIC | Age: 85
End: 2025-02-25
Payer: MEDICARE

## 2025-02-25 ENCOUNTER — OFFICE VISIT (OUTPATIENT)
Dept: FAMILY MEDICINE | Facility: CLINIC | Age: 85
End: 2025-02-25
Payer: MEDICARE

## 2025-02-25 VITALS
SYSTOLIC BLOOD PRESSURE: 120 MMHG | OXYGEN SATURATION: 97 % | DIASTOLIC BLOOD PRESSURE: 60 MMHG | HEART RATE: 64 BPM | BODY MASS INDEX: 25.46 KG/M2 | RESPIRATION RATE: 18 BRPM | WEIGHT: 168 LBS | HEIGHT: 68 IN

## 2025-02-25 DIAGNOSIS — M79.89 LEG SWELLING: Primary | ICD-10-CM

## 2025-02-25 DIAGNOSIS — M79.89 LEG SWELLING: ICD-10-CM

## 2025-02-25 DIAGNOSIS — I10 PRIMARY HYPERTENSION: ICD-10-CM

## 2025-02-25 DIAGNOSIS — N18.32 STAGE 3B CHRONIC KIDNEY DISEASE: ICD-10-CM

## 2025-02-25 LAB
ANION GAP SERPL CALC-SCNC: 12 MMOL/L (ref 8–16)
BNP SERPL-MCNC: 81 PG/ML (ref 0–99)
BUN SERPL-MCNC: 28 MG/DL (ref 8–23)
CALCIUM SERPL-MCNC: 8.6 MG/DL (ref 8.7–10.5)
CHLORIDE SERPL-SCNC: 108 MMOL/L (ref 95–110)
CO2 SERPL-SCNC: 17 MMOL/L (ref 23–29)
CREAT SERPL-MCNC: 1.7 MG/DL (ref 0.5–1.4)
EST. GFR  (NO RACE VARIABLE): 39.3 ML/MIN/1.73 M^2
GLUCOSE SERPL-MCNC: 110 MG/DL (ref 70–110)
POTASSIUM SERPL-SCNC: 4.1 MMOL/L (ref 3.5–5.1)
SODIUM SERPL-SCNC: 137 MMOL/L (ref 136–145)

## 2025-02-25 PROCEDURE — 3288F FALL RISK ASSESSMENT DOCD: CPT | Mod: CPTII,S$GLB,, | Performed by: STUDENT IN AN ORGANIZED HEALTH CARE EDUCATION/TRAINING PROGRAM

## 2025-02-25 PROCEDURE — 1157F ADVNC CARE PLAN IN RCRD: CPT | Mod: CPTII,S$GLB,, | Performed by: STUDENT IN AN ORGANIZED HEALTH CARE EDUCATION/TRAINING PROGRAM

## 2025-02-25 PROCEDURE — 3078F DIAST BP <80 MM HG: CPT | Mod: CPTII,S$GLB,, | Performed by: STUDENT IN AN ORGANIZED HEALTH CARE EDUCATION/TRAINING PROGRAM

## 2025-02-25 PROCEDURE — 3074F SYST BP LT 130 MM HG: CPT | Mod: CPTII,S$GLB,, | Performed by: STUDENT IN AN ORGANIZED HEALTH CARE EDUCATION/TRAINING PROGRAM

## 2025-02-25 PROCEDURE — 99214 OFFICE O/P EST MOD 30 MIN: CPT | Mod: S$GLB,,, | Performed by: STUDENT IN AN ORGANIZED HEALTH CARE EDUCATION/TRAINING PROGRAM

## 2025-02-25 PROCEDURE — 1126F AMNT PAIN NOTED NONE PRSNT: CPT | Mod: CPTII,S$GLB,, | Performed by: STUDENT IN AN ORGANIZED HEALTH CARE EDUCATION/TRAINING PROGRAM

## 2025-02-25 PROCEDURE — 1159F MED LIST DOCD IN RCRD: CPT | Mod: CPTII,S$GLB,, | Performed by: STUDENT IN AN ORGANIZED HEALTH CARE EDUCATION/TRAINING PROGRAM

## 2025-02-25 PROCEDURE — 1101F PT FALLS ASSESS-DOCD LE1/YR: CPT | Mod: CPTII,S$GLB,, | Performed by: STUDENT IN AN ORGANIZED HEALTH CARE EDUCATION/TRAINING PROGRAM

## 2025-02-25 PROCEDURE — 80048 BASIC METABOLIC PNL TOTAL CA: CPT | Performed by: STUDENT IN AN ORGANIZED HEALTH CARE EDUCATION/TRAINING PROGRAM

## 2025-02-25 PROCEDURE — 36415 COLL VENOUS BLD VENIPUNCTURE: CPT | Mod: ,,, | Performed by: STUDENT IN AN ORGANIZED HEALTH CARE EDUCATION/TRAINING PROGRAM

## 2025-02-25 PROCEDURE — 83880 ASSAY OF NATRIURETIC PEPTIDE: CPT | Performed by: STUDENT IN AN ORGANIZED HEALTH CARE EDUCATION/TRAINING PROGRAM

## 2025-02-25 RX ORDER — VALSARTAN 320 MG/1
320 TABLET ORAL EVERY MORNING
Qty: 90 TABLET | Refills: 1 | Status: SHIPPED | OUTPATIENT
Start: 2025-02-25 | End: 2025-02-26

## 2025-02-25 NOTE — PROGRESS NOTES
"  [facility]  [address], [address]  [place] [address]    Subjective     Patient ID: Mina Valentine is a 85 y.o. male who comes to the clinic for an acute visit.    Chief Complaint: Leg Swelling (Leg swelling 2 -3 weeks ago )    Leg swelling - has been a chronic issue for years. Was wearing compression stockings but they were starting to hurt so he stopped using them a few weeks ago.  Ever since then his legs have started to swell, no shortness a breath, no history of heart failure    ROS negative unless stated above       Objective     Vitals:    02/25/25 1322   BP: 120/60   BP Location: Right arm   Patient Position: Sitting   Pulse: 64   Resp: 18   SpO2: 97%   Weight: 76.2 kg (168 lb)   Height: 5' 8" (1.727 m)       Wt Readings from Last 3 Encounters:   02/25/25 1322 76.2 kg (168 lb)   11/15/24 0717 77.1 kg (170 lb)   09/16/24 1330 75.3 kg (166 lb)        Physical Exam  Constitutional:       General: He is not in acute distress.     Appearance: Normal appearance. He is not ill-appearing.   HENT:      Head: Normocephalic and atraumatic.      Mouth/Throat:      Mouth: Mucous membranes are moist.   Eyes:      Extraocular Movements: Extraocular movements intact.      Pupils: Pupils are equal, round, and reactive to light.   Cardiovascular:      Rate and Rhythm: Normal rate.   Pulmonary:      Effort: Pulmonary effort is normal. No respiratory distress.   Musculoskeletal:         General: Swelling present. Normal range of motion.      Cervical back: Normal range of motion and neck supple.      Right lower leg: Edema present.      Left lower leg: Edema present.   Skin:     General: Skin is warm and dry.   Neurological:      General: No focal deficit present.      Mental Status: He is alert and oriented to person, place, and time. Mental status is at baseline.   Psychiatric:         Mood and Affect: Mood normal.         Behavior: Behavior normal.         " Thought Content: Thought content normal.         Current Outpatient Medications   Medication Instructions    carvediloL (COREG) 3.125 mg, Oral, 2 times daily with meals    cloNIDine (CATAPRES) 0.1 mg, Oral, Every 4 hours PRN, Blood pressure over 180 on TOP (systolic)) or over 95 on BOTTOM (diastolic)    fexofenadine (ALLEGRA) 180 mg, Oral, Daily, For allergies    latanoprost 0.005 % ophthalmic solution 1 drop, Daily    levothyroxine (SYNTHROID) 75 MCG tablet TAKE 1 TABLET EVERY DAY ( NEED  APPOINTMENT AND LAB )    NIFEdipine (PROCARDIA-XL) 60 mg, Oral, 2 times daily    pravastatin (PRAVACHOL) 40 mg, Oral, Daily    valsartan (DIOVAN) 320 mg, Oral, Every morning           Assessment and Plan     1. Leg swelling        Here for an acute visit    For his leg swelling, it is likely gravity dependent edema ever since stopping his compression stockings.  I encouraged him to but those back on.  I will make sure his kidney function has not worsened given a CKD, checking proBNP as well    For CKD, checking BMP, continue blood pressure control    For hypertension, continue regimen as above    RTC w/ PCP PRN    I encouraged the patient to take all medications as prescribed and to keep follow up appointments with their providers. ED precautions given more concerning issues. Questions were invited and answered. Patient stated they had no other concerns. Follow up sooner if needed.     Feng Rey MD  02/25/2025 1:35 PM

## 2025-02-26 ENCOUNTER — RESULTS FOLLOW-UP (OUTPATIENT)
Dept: FAMILY MEDICINE | Facility: CLINIC | Age: 85
End: 2025-02-26
Payer: MEDICARE

## 2025-02-26 ENCOUNTER — PATIENT MESSAGE (OUTPATIENT)
Dept: FAMILY MEDICINE | Facility: CLINIC | Age: 85
End: 2025-02-26
Payer: MEDICARE

## 2025-02-26 RX ORDER — VALSARTAN 160 MG/1
160 TABLET ORAL DAILY
Qty: 90 TABLET | Refills: 0 | Status: SHIPPED | OUTPATIENT
Start: 2025-02-26 | End: 2026-02-26

## 2025-02-26 NOTE — PROGRESS NOTES
Please let patient know that her kidney function is stable but she needs to drink plenty of water.  No concern for heart failure

## 2025-02-27 NOTE — TELEPHONE ENCOUNTER
----- Message from Feng Rey MD sent at 2/26/2025  8:41 AM CST -----  Please let patient know that her kidney function is stable but she needs to drink plenty of water.  No concern for heart failure  ----- Message -----  From: [facility]  Sent: 2/25/2025   5:26 PM CST  To: Feng Rey MD

## 2025-03-19 RX ORDER — CARVEDILOL 3.12 MG/1
3.12 TABLET ORAL 2 TIMES DAILY WITH MEALS
Qty: 180 TABLET | Refills: 2 | Status: SHIPPED | OUTPATIENT
Start: 2025-03-19

## 2025-03-19 NOTE — TELEPHONE ENCOUNTER
No care due was identified.  [facility] Embedded Care Due Messages. Reference number: [id number].   3/19/2025 9:22:44 AM CDT

## 2025-03-19 NOTE — TELEPHONE ENCOUNTER
Refill Decision Note   Mina Meme  is requesting a refill authorization.  Brief Assessment and Rationale for Refill:  Approve     Medication Therapy Plan:         Comments:     Note composed:1:32 PM 03/19/2025

## 2025-03-30 DIAGNOSIS — I10 PRIMARY HYPERTENSION: ICD-10-CM

## 2025-03-30 NOTE — TELEPHONE ENCOUNTER
No care due was identified.  [facility] Embedded Care Due Messages. Reference number: [id number].   3/30/2025 5:18:09 PM CDT

## 2025-03-30 NOTE — TELEPHONE ENCOUNTER
No care due was identified.  [facility] Embedded Care Due Messages. Reference number: [id number].   3/30/2025 5:18:41 PM CDT

## 2025-03-31 RX ORDER — PRAVASTATIN SODIUM 40 MG/1
40 TABLET ORAL DAILY
Qty: 90 TABLET | Refills: 2 | Status: SHIPPED | OUTPATIENT
Start: 2025-03-31

## 2025-03-31 RX ORDER — NIFEDIPINE 60 MG/1
60 TABLET, EXTENDED RELEASE ORAL 2 TIMES DAILY
Qty: 180 TABLET | Refills: 2 | Status: SHIPPED | OUTPATIENT
Start: 2025-03-31

## 2025-03-31 NOTE — TELEPHONE ENCOUNTER
Refill Decision Note   Mina Valentine  is requesting a refill authorization.  Brief Assessment and Rationale for Refill:  Approve     Medication Therapy Plan:       Medication Reconciliation Completed: No   Comments:     No Care Gaps recommended.     Note composed:11:47 AM 03/31/2025

## 2025-03-31 NOTE — TELEPHONE ENCOUNTER
Refill Routing Note   Medication(s) are not appropriate for processing by [facility] for the following reason(s):        No active prescription written by provider    [facility] action(s):  Defer             Appointments  past 12m or future 3m with PCP    Date Provider   Last Visit   11/15/2024 Blanca Hatch MD   Next Visit   Visit date not found Blanca Hatch MD   ED visits in past 90 days: 0        Note composed:11:53 AM 03/31/2025

## 2025-04-03 RX ORDER — VALSARTAN 160 MG/1
160 TABLET ORAL DAILY
Qty: 90 TABLET | Refills: 2 | Status: SHIPPED | OUTPATIENT
Start: 2025-04-03

## 2025-06-14 DIAGNOSIS — E03.9 HYPOTHYROIDISM, UNSPECIFIED TYPE: ICD-10-CM

## 2025-06-14 NOTE — TELEPHONE ENCOUNTER
No care due was identified.  [facility] Embedded Care Due Messages. Reference number: [id number].   6/14/2025 10:13:01 AM CDT

## 2025-06-15 RX ORDER — LEVOTHYROXINE SODIUM 75 UG/1
TABLET ORAL
Qty: 90 TABLET | Refills: 1 | Status: SHIPPED | OUTPATIENT
Start: 2025-06-15

## 2025-06-15 NOTE — TELEPHONE ENCOUNTER
Refill Decision Note   Mina Valentine  is requesting a refill authorization.  Brief Assessment and Rationale for Refill:  Approve     Medication Therapy Plan:         Comments:     Note composed:2:48 PM 06/15/2025             Appointments     Last Visit   11/15/2024 Blanca Hatch MD   Next Visit   Visit date not found Blanca Hatch MD

## 2025-07-23 ENCOUNTER — LAB VISIT (OUTPATIENT)
Dept: LAB | Facility: HOSPITAL | Age: 85
End: 2025-07-23
Payer: MEDICARE

## 2025-07-23 ENCOUNTER — OFFICE VISIT (OUTPATIENT)
Dept: FAMILY MEDICINE | Facility: CLINIC | Age: 85
End: 2025-07-23
Payer: MEDICARE

## 2025-07-23 VITALS
HEART RATE: 77 BPM | BODY MASS INDEX: 25.24 KG/M2 | SYSTOLIC BLOOD PRESSURE: 130 MMHG | DIASTOLIC BLOOD PRESSURE: 60 MMHG | OXYGEN SATURATION: 97 % | WEIGHT: 166.56 LBS | HEIGHT: 68 IN

## 2025-07-23 DIAGNOSIS — R09.81 NASAL CONGESTION: ICD-10-CM

## 2025-07-23 DIAGNOSIS — E03.9 HYPOTHYROIDISM, UNSPECIFIED TYPE: ICD-10-CM

## 2025-07-23 DIAGNOSIS — N18.32 STAGE 3B CHRONIC KIDNEY DISEASE: ICD-10-CM

## 2025-07-23 DIAGNOSIS — N18.32 STAGE 3B CHRONIC KIDNEY DISEASE: Primary | ICD-10-CM

## 2025-07-23 LAB
ABSOLUTE EOSINOPHIL (OHS): 0.27 K/UL
ABSOLUTE MONOCYTE (OHS): 1.16 K/UL (ref 0.3–1)
ABSOLUTE NEUTROPHIL COUNT (OHS): 5.22 K/UL (ref 1.8–7.7)
ALBUMIN SERPL BCP-MCNC: 3.6 G/DL (ref 3.5–5.2)
ALP SERPL-CCNC: 74 UNIT/L (ref 40–150)
ALT SERPL W/O P-5'-P-CCNC: 25 UNIT/L (ref 10–44)
ANION GAP (OHS): 12 MMOL/L (ref 8–16)
AST SERPL-CCNC: 23 UNIT/L (ref 11–45)
BASOPHILS # BLD AUTO: 0.08 K/UL
BASOPHILS NFR BLD AUTO: 0.8 %
BILIRUB SERPL-MCNC: 0.7 MG/DL (ref 0.1–1)
BUN SERPL-MCNC: 31 MG/DL (ref 8–23)
CALCIUM SERPL-MCNC: 9.4 MG/DL (ref 8.7–10.5)
CHLORIDE SERPL-SCNC: 106 MMOL/L (ref 95–110)
CO2 SERPL-SCNC: 20 MMOL/L (ref 23–29)
CREAT SERPL-MCNC: 1.8 MG/DL (ref 0.5–1.4)
ERYTHROCYTE [DISTWIDTH] IN BLOOD BY AUTOMATED COUNT: 12.4 % (ref 11.5–14.5)
GFR SERPLBLD CREATININE-BSD FMLA CKD-EPI: 37 ML/MIN/1.73/M2
GLUCOSE SERPL-MCNC: 104 MG/DL (ref 70–110)
HCT VFR BLD AUTO: 39.2 % (ref 40–54)
HGB BLD-MCNC: 13.3 GM/DL (ref 14–18)
IMM GRANULOCYTES # BLD AUTO: 0.03 K/UL (ref 0–0.04)
IMM GRANULOCYTES NFR BLD AUTO: 0.3 % (ref 0–0.5)
LYMPHOCYTES # BLD AUTO: 2.72 K/UL (ref 1–4.8)
MCH RBC QN AUTO: 31 PG (ref 27–31)
MCHC RBC AUTO-ENTMCNC: 33.9 G/DL (ref 32–36)
MCV RBC AUTO: 91 FL (ref 82–98)
NUCLEATED RBC (/100WBC) (OHS): 0 /100 WBC
PLATELET # BLD AUTO: 293 K/UL (ref 150–450)
PMV BLD AUTO: 9.5 FL (ref 9.2–12.9)
POTASSIUM SERPL-SCNC: 4.4 MMOL/L (ref 3.5–5.1)
PROT SERPL-MCNC: 7.8 GM/DL (ref 6–8.4)
RBC # BLD AUTO: 4.29 M/UL (ref 4.6–6.2)
RELATIVE EOSINOPHIL (OHS): 2.8 %
RELATIVE LYMPHOCYTE (OHS): 28.7 % (ref 18–48)
RELATIVE MONOCYTE (OHS): 12.2 % (ref 4–15)
RELATIVE NEUTROPHIL (OHS): 55.2 % (ref 38–73)
SODIUM SERPL-SCNC: 138 MMOL/L (ref 136–145)
WBC # BLD AUTO: 9.48 K/UL (ref 3.9–12.7)

## 2025-07-23 PROCEDURE — 84443 ASSAY THYROID STIM HORMONE: CPT

## 2025-07-23 PROCEDURE — 1157F ADVNC CARE PLAN IN RCRD: CPT | Mod: CPTII,S$GLB,, | Performed by: NURSE PRACTITIONER

## 2025-07-23 PROCEDURE — 1159F MED LIST DOCD IN RCRD: CPT | Mod: CPTII,S$GLB,, | Performed by: NURSE PRACTITIONER

## 2025-07-23 PROCEDURE — 99214 OFFICE O/P EST MOD 30 MIN: CPT | Mod: S$GLB,,, | Performed by: NURSE PRACTITIONER

## 2025-07-23 PROCEDURE — 3075F SYST BP GE 130 - 139MM HG: CPT | Mod: CPTII,S$GLB,, | Performed by: NURSE PRACTITIONER

## 2025-07-23 PROCEDURE — 36415 COLL VENOUS BLD VENIPUNCTURE: CPT | Mod: PN

## 2025-07-23 PROCEDURE — 3078F DIAST BP <80 MM HG: CPT | Mod: CPTII,S$GLB,, | Performed by: NURSE PRACTITIONER

## 2025-07-23 PROCEDURE — 82043 UR ALBUMIN QUANTITATIVE: CPT

## 2025-07-23 PROCEDURE — 85025 COMPLETE CBC W/AUTO DIFF WBC: CPT

## 2025-07-23 PROCEDURE — 99999 PR PBB SHADOW E&M-EST. PATIENT-LVL III: CPT | Mod: PBBFAC,,, | Performed by: NURSE PRACTITIONER

## 2025-07-23 PROCEDURE — 1160F RVW MEDS BY RX/DR IN RCRD: CPT | Mod: CPTII,S$GLB,, | Performed by: NURSE PRACTITIONER

## 2025-07-23 PROCEDURE — 3288F FALL RISK ASSESSMENT DOCD: CPT | Mod: CPTII,S$GLB,, | Performed by: NURSE PRACTITIONER

## 2025-07-23 PROCEDURE — 80053 COMPREHEN METABOLIC PANEL: CPT

## 2025-07-23 PROCEDURE — 1101F PT FALLS ASSESS-DOCD LE1/YR: CPT | Mod: CPTII,S$GLB,, | Performed by: NURSE PRACTITIONER

## 2025-07-23 PROCEDURE — 1126F AMNT PAIN NOTED NONE PRSNT: CPT | Mod: CPTII,S$GLB,, | Performed by: NURSE PRACTITIONER

## 2025-07-23 RX ORDER — FEXOFENADINE HCL 60 MG/1
180 TABLET, FILM COATED ORAL DAILY
COMMUNITY

## 2025-07-23 RX ORDER — FLUTICASONE PROPIONATE 50 MCG
1 SPRAY, SUSPENSION (ML) NASAL DAILY
Qty: 9.9 ML | Refills: 0 | Status: SHIPPED | OUTPATIENT
Start: 2025-07-23

## 2025-07-23 NOTE — PROGRESS NOTES
"Subjective     Patient ID: Mina Valentine is a 85 y.o. male.    Chief Complaint: Follow-up    History of Present Illness    CHIEF COMPLAINT:  Patient presents today for follow up of kidney labs.    ALLERGIES AND UPPER RESPIRATORY SYMPTOMS:  He reports ongoing allergies managed with daily Allegra and saline nasal spray. He describes throat tenderness and productive cough with white sputum, particularly notable when lying down but not consistently present throughout the night. The cough is intermittent without significant nasal congestion. He has been prescribed Flonase nasal spray. He  believes current symptoms are allergy-related.    BLOOD PRESSURE:  He self-monitors blood pressure twice weekly, electronically transmitting readings which he reports as "optimal" and consistent with treatment goals. Digital medicine BP reading today was elevated today due to watching stressful TV while taking BP.      MEDICATIONS:  Valsartan dose was reduced from 360 to 160 mg due to having some hypotension. He continues levothyroxine without dose adjustments.    PODIATRIC CONCERNS:  He reports ongoing swelling in his right leg managed with compression stockings. He has a bacterial infection on one toe currently being monitored by a dermatology.     EXERCISE:  He exercises at apartment gym when the facility is empty and maintains a daily walking routine with his wife.      Review of Systems   Constitutional:  Negative for chills, diaphoresis, fatigue and fever.   HENT:  Positive for postnasal drip. Negative for nasal congestion, ear pain, facial swelling, nosebleeds, rhinorrhea, sinus pressure/congestion, sneezing, sore throat and tinnitus.    Eyes:  Negative for photophobia, pain, discharge and redness.   Respiratory:  Negative for cough, choking, chest tightness, shortness of breath and wheezing.    Cardiovascular:  Positive for leg swelling (R leg more prominent than L). Negative for chest pain and palpitations. "   Gastrointestinal:  Negative for abdominal pain, blood in stool, constipation and diarrhea.   Endocrine: Negative for cold intolerance, heat intolerance, polydipsia, polyphagia and polyuria.   Genitourinary:  Negative for decreased urine volume, difficulty urinating, dysuria, erectile dysfunction, frequency, hematuria, scrotal swelling, testicular pain and urgency.   Musculoskeletal:  Negative for arthralgias, back pain, gait problem, joint swelling, leg pain, myalgias, neck pain, neck stiffness and joint deformity.   Neurological:  Negative for dizziness, seizures, syncope, speech difficulty, weakness, numbness and headaches.   Psychiatric/Behavioral:  Negative for agitation, behavioral problems, confusion, decreased concentration, dysphoric mood, hallucinations, self-injury, sleep disturbance and suicidal ideas. The patient is not nervous/anxious and is not hyperactive.        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   Constitutional:       General: He is not in acute distress.     Appearance: Normal appearance. He is normal weight. He is not ill-appearing.   HENT:      Right Ear: Tympanic membrane normal.      Nose: Nose normal. No congestion or rhinorrhea.      Mouth/Throat:      Pharynx: No oropharyngeal exudate or posterior oropharyngeal erythema.   Eyes:      Pupils: Pupils are equal, round, and reactive to light.   Cardiovascular:      Rate and Rhythm: Normal rate and regular rhythm.      Pulses: Normal pulses.      Heart sounds: Normal heart sounds.   Pulmonary:      Effort: Pulmonary effort is  normal. No respiratory distress.      Breath sounds: Normal breath sounds.   Abdominal:      General: Abdomen is flat. Bowel sounds are normal.      Palpations: Abdomen is soft.      Tenderness: There is no abdominal tenderness.   Musculoskeletal:         General: No swelling or tenderness. Normal range of motion.      Cervical back: Normal range of motion and neck supple. No tenderness.      Right lower leg: No edema.      Left lower leg: No edema.   Lymphadenopathy:      Cervical: No cervical adenopathy.   Neurological:      General: No focal deficit present.      Mental Status: He is alert and oriented to person, place, and time.   Psychiatric:         Mood and Affect: Mood normal.         Behavior: Behavior normal.         Thought Content: Thought content normal.        Latest Reference Range & Units 02/25/25 13:50   BUN 8 - 23 mg/dL 28 (H)   Creatinine 0.5 - 1.4 mg/dL 1.7 (H)   eGFR >60 mL/min/1.73 m^2 39.3 !   (H): Data is abnormally high  !: Data is abnormal     Assessment and Plan     Assessment & Plan    N18.32 Stage 3b chronic kidney disease  E03.9 Hypothyroidism, unspecified type  R09.81 Nasal congestion    STAGE 3B CHRONIC KIDNEY DISEASE:  - Stable  - Assessed renal function, noting it has been about 6 months since last labs.   - Ordered renal function labs and urinalysis to check for proteinuria.  - Reviewed BP readings, which appear well-controlled on current regimen.  - Acknowledged Dr. Sandoval's adjustment of Valsartan dosage from 360mg to 160mg.    HYPOTHYROIDISM, UNSPECIFIED TYPE:  - Stable.  Denies any concerning symptoms  - Ordered thyroid function tests to determine if levothyroxine dosage adjustment is needed.  - Will make adjustments to medications as needed based off lab results    NASAL CONGESTION:  - This is a new symptom. Explained that nighttime coughing may be due to postnasal drip.  - Started prescription-strength Flonase nasal spray, explaining it contains a steroid to help open nasal  passages, unlike saline spray.  - Continued Allegra daily for comprehensive allergy management.    LIFESTYLE CHANGES:  - Patient to continue daily walking and exercise routine, utilizing the gym when available, and maintain current activity level to preserve health and cognitive function.  - Patient to stay hydrated, especially during hot weather.      Follow up when lab results are available via patient portal ([facility]).  Continue using digital medicine to report blood pressures.   Will contact the patient if any medication adjustments are needed based on lab results.       Follow-up with PCP in 6 months or sooner as needed             Risks, benefits, and side effects were discussed with the patient. All questions were answered to the fullest satisfaction of the patient, and patient verbalized understanding and agreement to treatment plan. Patient is to call with any new or worsening symptoms, or present to the ER.       KADE Elena  Family Medicine   [facility]    This note was generated with the assistance of ambient listening technology. Verbal consent was obtained by the patient and accompanying visitor(s) for the recording of patient appointment to facilitate this note. I attest to having reviewed and edited the generated note for accuracy, though some syntax or spelling errors may persist. Please contact the author of this note for any clarification.

## 2025-07-24 LAB
ALBUMIN/CREAT UR: 4.4 UG/MG
CREAT UR-MCNC: 295 MG/DL (ref 23–375)
MICROALBUMIN UR-MCNC: 13 UG/ML (ref ?–5000)
T4 FREE SERPL-MCNC: 1.17 NG/DL (ref 0.71–1.51)
T4 FREE SERPL-MCNC: 1.17 NG/DL (ref 0.71–1.51)
TSH SERPL-ACNC: 2.24 UIU/ML (ref 0.4–4)

## 2025-07-27 ENCOUNTER — PATIENT MESSAGE (OUTPATIENT)
Dept: ADMINISTRATIVE | Facility: OTHER | Age: 85
End: 2025-07-27
Payer: MEDICARE

## 2025-08-13 ENCOUNTER — OFFICE VISIT (OUTPATIENT)
Dept: FAMILY MEDICINE | Facility: CLINIC | Age: 85
End: 2025-08-13
Payer: MEDICARE

## 2025-08-13 VITALS
HEIGHT: 68 IN | OXYGEN SATURATION: 97 % | HEART RATE: 68 BPM | WEIGHT: 165.69 LBS | BODY MASS INDEX: 25.11 KG/M2 | SYSTOLIC BLOOD PRESSURE: 138 MMHG | TEMPERATURE: 98 F | DIASTOLIC BLOOD PRESSURE: 60 MMHG

## 2025-08-13 DIAGNOSIS — J02.9 PHARYNGITIS, UNSPECIFIED ETIOLOGY: ICD-10-CM

## 2025-08-13 DIAGNOSIS — Z87.891 FORMER SMOKER: ICD-10-CM

## 2025-08-13 DIAGNOSIS — J21.9 ACUTE BRONCHIOLITIS DUE TO UNSPECIFIED ORGANISM: Primary | ICD-10-CM

## 2025-08-13 LAB
CTP QC/QA: YES
MOLECULAR STREP A: NEGATIVE

## 2025-08-13 PROCEDURE — 3075F SYST BP GE 130 - 139MM HG: CPT | Mod: CPTII,S$GLB,, | Performed by: FAMILY MEDICINE

## 2025-08-13 PROCEDURE — 1101F PT FALLS ASSESS-DOCD LE1/YR: CPT | Mod: CPTII,S$GLB,, | Performed by: FAMILY MEDICINE

## 2025-08-13 PROCEDURE — 1159F MED LIST DOCD IN RCRD: CPT | Mod: CPTII,S$GLB,, | Performed by: FAMILY MEDICINE

## 2025-08-13 PROCEDURE — 3288F FALL RISK ASSESSMENT DOCD: CPT | Mod: CPTII,S$GLB,, | Performed by: FAMILY MEDICINE

## 2025-08-13 PROCEDURE — 1126F AMNT PAIN NOTED NONE PRSNT: CPT | Mod: CPTII,S$GLB,, | Performed by: FAMILY MEDICINE

## 2025-08-13 PROCEDURE — 87651 STREP A DNA AMP PROBE: CPT | Mod: QW,S$GLB,, | Performed by: FAMILY MEDICINE

## 2025-08-13 PROCEDURE — 99214 OFFICE O/P EST MOD 30 MIN: CPT | Mod: S$GLB,,, | Performed by: FAMILY MEDICINE

## 2025-08-13 PROCEDURE — 3078F DIAST BP <80 MM HG: CPT | Mod: CPTII,S$GLB,, | Performed by: FAMILY MEDICINE

## 2025-08-13 PROCEDURE — 1157F ADVNC CARE PLAN IN RCRD: CPT | Mod: CPTII,S$GLB,, | Performed by: FAMILY MEDICINE

## 2025-08-13 PROCEDURE — 99999 PR PBB SHADOW E&M-EST. PATIENT-LVL III: CPT | Mod: PBBFAC,,, | Performed by: FAMILY MEDICINE

## 2025-08-13 RX ORDER — AZITHROMYCIN 250 MG/1
TABLET, FILM COATED ORAL
Qty: 6 TABLET | Refills: 0 | Status: SHIPPED | OUTPATIENT
Start: 2025-08-13 | End: 2025-08-18

## 2025-08-13 RX ORDER — PANTOPRAZOLE SODIUM 20 MG/1
20 TABLET, DELAYED RELEASE ORAL DAILY
Qty: 7 TABLET | Refills: 0 | Status: SHIPPED | OUTPATIENT
Start: 2025-08-13 | End: 2025-08-20

## (undated) DEVICE — GLOVE SURG ULTRA TOUCH 8.5

## (undated) DEVICE — DRAIN PENROSE XRAY 18 X 1/4 ST

## (undated) DEVICE — SUT SILK 0 SH 30IN BLK BR

## (undated) DEVICE — COVER TABLE BACK 44IN X 90IN

## (undated) DEVICE — SEE MEDLINE ITEM 156964

## (undated) DEVICE — SEE MEDLINE ITEM 154981

## (undated) DEVICE — UNDERGLOVE BIOGEL PI SZ 6.5 LF

## (undated) DEVICE — GLOVE SURG ULTRA TOUCH 6

## (undated) DEVICE — GLOVE SENSICARE PI SURG 6.5

## (undated) DEVICE — SUT PROLENE BLU 2-0 SH 48IN

## (undated) DEVICE — SUT 3-0 VICRYL / SH (J416)

## (undated) DEVICE — GOWN POLY REINF BRTH SLV LG

## (undated) DEVICE — DRAPE THREE-QUARTER 53X77IN

## (undated) DEVICE — SUT CTD VICRYL 4-0 P-3 18IN

## (undated) DEVICE — SUT CTD VICRYL 3-0 CR/SH

## (undated) DEVICE — SET CYSTO IRRIGATION UNIV SPIK

## (undated) DEVICE — GOWN POLY REINF BRTH SLV 3XL

## (undated) DEVICE — SPONGE COTTON TRAY 4X4IN

## (undated) DEVICE — GLOVE SURGEONS ULTRA TOUCH 6.5

## (undated) DEVICE — GLOVE SENSICARE PI SURG 7.5

## (undated) DEVICE — ADHESIVE DERMABOND ADVANCED